# Patient Record
Sex: MALE | Race: WHITE | Employment: OTHER | URBAN - METROPOLITAN AREA
[De-identification: names, ages, dates, MRNs, and addresses within clinical notes are randomized per-mention and may not be internally consistent; named-entity substitution may affect disease eponyms.]

---

## 2017-03-27 ENCOUNTER — LAB ENCOUNTER (OUTPATIENT)
Dept: LAB | Age: 77
End: 2017-03-27
Attending: NURSE PRACTITIONER
Payer: MEDICARE

## 2017-03-27 ENCOUNTER — APPOINTMENT (OUTPATIENT)
Dept: LAB | Age: 77
End: 2017-03-27
Attending: FAMILY MEDICINE
Payer: MEDICARE

## 2017-03-27 DIAGNOSIS — Z12.5 SCREENING FOR PROSTATE CANCER: ICD-10-CM

## 2017-03-27 DIAGNOSIS — R63.4 WEIGHT LOSS: ICD-10-CM

## 2017-03-27 DIAGNOSIS — E03.9 HYPOTHYROIDISM, UNSPECIFIED TYPE: Chronic | ICD-10-CM

## 2017-03-27 DIAGNOSIS — E78.00 PURE HYPERCHOLESTEROLEMIA: ICD-10-CM

## 2017-03-27 LAB
ALBUMIN SERPL-MCNC: 3.8 G/DL (ref 3.5–4.8)
ALP LIVER SERPL-CCNC: 70 U/L (ref 45–117)
ALT SERPL-CCNC: 15 U/L (ref 17–63)
AST SERPL-CCNC: 15 U/L (ref 15–41)
BASOPHILS # BLD AUTO: 0.03 X10(3) UL (ref 0–0.1)
BASOPHILS NFR BLD AUTO: 0.6 %
BILIRUB SERPL-MCNC: 1.9 MG/DL (ref 0.1–2)
BUN BLD-MCNC: 18 MG/DL (ref 8–20)
CALCIUM BLD-MCNC: 9.1 MG/DL (ref 8.3–10.3)
CHLORIDE: 97 MMOL/L (ref 101–111)
CHOLEST SMN-MCNC: 148 MG/DL (ref ?–200)
CO2: 27 MMOL/L (ref 22–32)
COMPLEXED PSA SERPL-MCNC: 0.36 NG/ML (ref 0.01–4)
CREAT BLD-MCNC: 1.17 MG/DL (ref 0.7–1.3)
EOSINOPHIL # BLD AUTO: 0.21 X10(3) UL (ref 0–0.3)
EOSINOPHIL NFR BLD AUTO: 4.2 %
ERYTHROCYTE [DISTWIDTH] IN BLOOD BY AUTOMATED COUNT: 13.1 % (ref 11.5–16)
FREE T4: 1.6 NG/DL (ref 0.9–1.8)
GLUCOSE BLD-MCNC: 107 MG/DL (ref 70–99)
HCT VFR BLD AUTO: 40.7 % (ref 37–53)
HDLC SERPL-MCNC: 70 MG/DL (ref 45–?)
HDLC SERPL: 2.11 {RATIO} (ref ?–4.97)
HGB BLD-MCNC: 14 G/DL (ref 13–17)
IMMATURE GRANULOCYTE COUNT: 0.01 X10(3) UL (ref 0–1)
IMMATURE GRANULOCYTE RATIO %: 0.2 %
LDLC SERPL CALC-MCNC: 59 MG/DL (ref ?–130)
LYMPHOCYTES # BLD AUTO: 0.99 X10(3) UL (ref 0.9–4)
LYMPHOCYTES NFR BLD AUTO: 19.8 %
M PROTEIN MFR SERPL ELPH: 6.8 G/DL (ref 6.1–8.3)
MCH RBC QN AUTO: 32 PG (ref 27–33.2)
MCHC RBC AUTO-ENTMCNC: 34.4 G/DL (ref 31–37)
MCV RBC AUTO: 92.9 FL (ref 80–99)
MONOCYTES # BLD AUTO: 0.34 X10(3) UL (ref 0.1–0.6)
MONOCYTES NFR BLD AUTO: 6.8 %
NEUTROPHIL ABS PRELIM: 3.42 X10 (3) UL (ref 1.3–6.7)
NEUTROPHILS # BLD AUTO: 3.42 X10(3) UL (ref 1.3–6.7)
NEUTROPHILS NFR BLD AUTO: 68.4 %
NONHDLC SERPL-MCNC: 78 MG/DL (ref ?–130)
PLATELET # BLD AUTO: 173 10(3)UL (ref 150–450)
POTASSIUM SERPL-SCNC: 3.9 MMOL/L (ref 3.6–5.1)
RBC # BLD AUTO: 4.38 X10(6)UL (ref 3.8–5.8)
RED CELL DISTRIBUTION WIDTH-SD: 44.8 FL (ref 35.1–46.3)
SODIUM SERPL-SCNC: 135 MMOL/L (ref 136–144)
TRIGLYCERIDES: 94 MG/DL (ref ?–150)
TSI SER-ACNC: 1.68 MIU/ML (ref 0.35–5.5)
VLDL: 19 MG/DL (ref 5–40)
WBC # BLD AUTO: 5 X10(3) UL (ref 4–13)

## 2017-03-27 PROCEDURE — 84443 ASSAY THYROID STIM HORMONE: CPT

## 2017-03-27 PROCEDURE — 84439 ASSAY OF FREE THYROXINE: CPT

## 2017-03-27 PROCEDURE — 80061 LIPID PANEL: CPT

## 2017-03-27 PROCEDURE — 36415 COLL VENOUS BLD VENIPUNCTURE: CPT

## 2017-03-27 PROCEDURE — 85025 COMPLETE CBC W/AUTO DIFF WBC: CPT

## 2017-03-27 PROCEDURE — 80053 COMPREHEN METABOLIC PANEL: CPT

## 2017-03-27 NOTE — PROGRESS NOTES
Was seen last week in the GI office for constipation. He had been taking his MiraLAX very intermittently. The nurse practitioner there has been doing it twice a day. Bowels are moving very regular at this point. He does take fentanyl patch 25+12 µg.   I Procedure: TRANSFORAMINAL EPIDURAL - LUMBAR;  Surgeon: Olivia South MD;  Location: [facility] FOR PAIN MANAGEMENT    INJECTION, W/WO CONTRAST, DX/THERAPEUTIC SUBSTANCE, EPIDURAL/SUBARACHNOID; LUMBAR/SACRAL  9/28/2012    Comment Procedure: TRANSFORAMINAL PAIN MANAGEMENT    PATIENT DOCUMENTED NOT TO HAVE EXPERIENCED ANY OF THE FOLLOWING EVENTS  12/18/2013    Comment Procedure: STIMULATOR TRIAL EPIDURAL LEAD;  Surgeon: Carolina Weir MD;  Location: [address] [place] [address] MANAGEMENT    PERCUT IMPLNT [address] Procedure: INTRATHECAL PUMP TRIAL;  Surgeon: Yanet Paris MD;  Location: [address] [place] [address]    PATIENT [address] [place] [address] FOR IV ANTIBIOTIC SURGICAL SITE INFECTION PROPHYLAXIS.  4/28/2014    Comment Procedure: INTRATHECAL PUMP TRIAL [facility] ENDOSCOPY     MEDICATIONS:    Current Outpatient Prescriptions:  Levothyroxine Sodium 88 MCG Oral Tab TAKE 1 TABLET (88 MCG TOTAL) BY MOUTH DAILY. Disp: 90 tablet Rfl: 3   ClonazePAM 2 MG Oral Tab Take 1 tablet (2 mg total) by mouth nightly.  Disp: 30 ta Father    • Heart Disorder Father    • Hypertension Mother    • Heart Disorder Mother        PHYSICAL EXAM:  /82 mmHg  Pulse 106  Temp(Src) 97.8 °F (36.6 °C) (Oral)  Resp 18  Ht 70\"  Wt 129 lb  BMI 18.51 kg/m2  SpO2 97%    Alert thin male no acute d

## 2017-03-29 ENCOUNTER — PATIENT OUTREACH (OUTPATIENT)
Dept: CASE MANAGEMENT | Age: 77
End: 2017-03-29

## 2017-03-29 ENCOUNTER — TELEPHONE (OUTPATIENT)
Dept: CASE MANAGEMENT | Age: 77
End: 2017-03-29

## 2017-03-29 NOTE — TELEPHONE ENCOUNTER
CBC and CMP was done in epic Please review for patient. Also is it okay for patient to start a probiotic? Please send back to me so i can contact Patient back.      Lab Results  Component Value Date   WBC 5.0 03/27/2017   HGB 14.0 03/27/2017   HCT 40.7 03/2

## 2017-03-30 ENCOUNTER — PATIENT OUTREACH (OUTPATIENT)
Dept: CASE MANAGEMENT | Age: 77
End: 2017-03-30

## 2017-03-30 NOTE — PROGRESS NOTES
Called pt to do an assessment and Pt declined program.     Patient identified with a potential need for Chronic Care Management services. Called patient to introduce self and availability of Chronic Care Management services.   Patient informed about the fo

## 2017-03-31 NOTE — TELEPHONE ENCOUNTER
Message:    Giorgi Chen is referring to 3-27-17 labs ordered per Jarad Turner. Approval for probiotic?

## 2017-06-16 NOTE — TELEPHONE ENCOUNTER
From: Pranav Scott  To: Maribell Freire MD  Sent: 6/16/2017 3:34 PM CDT  Subject: Prescription Question    I would like to request a refill on Clonazepam (2MG) with 3 refills.   Thank You,  Daniel Calle

## 2017-06-17 NOTE — TELEPHONE ENCOUNTER
----- Message from [address] [place] [address] [address] Generic sent at 6/16/2017  3:34 PM CDT -----  Regarding: Prescription Question  Contact: [phone]  I would like to request a refill on Clonazepam (2MG) with 3 refills.   Thank You,  Ivon Ritchie

## 2017-06-23 ENCOUNTER — APPOINTMENT (OUTPATIENT)
Dept: LAB | Age: 77
End: 2017-06-23
Attending: FAMILY MEDICINE
Payer: MEDICARE

## 2017-06-23 ENCOUNTER — OFFICE VISIT (OUTPATIENT)
Dept: FAMILY MEDICINE CLINIC | Facility: CLINIC | Age: 77
End: 2017-06-23

## 2017-06-23 ENCOUNTER — HOSPITAL ENCOUNTER (OUTPATIENT)
Dept: GENERAL RADIOLOGY | Age: 77
Discharge: HOME | End: 2017-06-23
Attending: FAMILY MEDICINE
Payer: MEDICARE

## 2017-06-23 VITALS
WEIGHT: 126 LBS | RESPIRATION RATE: 20 BRPM | OXYGEN SATURATION: 96 % | TEMPERATURE: 98 F | SYSTOLIC BLOOD PRESSURE: 124 MMHG | BODY MASS INDEX: 18 KG/M2 | HEART RATE: 78 BPM | DIASTOLIC BLOOD PRESSURE: 80 MMHG

## 2017-06-23 DIAGNOSIS — R13.14 PHARYNGOESOPHAGEAL DYSPHAGIA: ICD-10-CM

## 2017-06-23 DIAGNOSIS — R20.2 PARESTHESIA AND PAIN OF BOTH UPPER EXTREMITIES: ICD-10-CM

## 2017-06-23 DIAGNOSIS — E53.8 VITAMIN B12 DEFICIENCY: ICD-10-CM

## 2017-06-23 DIAGNOSIS — M79.602 PARESTHESIA AND PAIN OF BOTH UPPER EXTREMITIES: ICD-10-CM

## 2017-06-23 DIAGNOSIS — R73.9 HYPERGLYCEMIA: ICD-10-CM

## 2017-06-23 DIAGNOSIS — R13.14 PHARYNGOESOPHAGEAL DYSPHAGIA: Primary | ICD-10-CM

## 2017-06-23 DIAGNOSIS — M79.601 PARESTHESIA AND PAIN OF BOTH UPPER EXTREMITIES: ICD-10-CM

## 2017-06-23 DIAGNOSIS — H61.23 BILATERAL IMPACTED CERUMEN: ICD-10-CM

## 2017-06-23 PROCEDURE — 72052 X-RAY EXAM NECK SPINE 6/>VWS: CPT | Performed by: FAMILY MEDICINE

## 2017-06-23 PROCEDURE — 99213 OFFICE O/P EST LOW 20 MIN: CPT | Performed by: FAMILY MEDICINE

## 2017-06-23 PROCEDURE — 83036 HEMOGLOBIN GLYCOSYLATED A1C: CPT

## 2017-06-23 PROCEDURE — 69210 REMOVE IMPACTED EAR WAX UNI: CPT | Performed by: FAMILY MEDICINE

## 2017-06-23 PROCEDURE — 36415 COLL VENOUS BLD VENIPUNCTURE: CPT

## 2017-06-23 RX ORDER — BLOOD SUGAR DIAGNOSTIC
STRIP MISCELLANEOUS
Qty: 10 EACH | Refills: 2 | Status: SHIPPED | OUTPATIENT
Start: 2017-06-23 | End: 2021-01-07

## 2017-06-23 RX ORDER — CYANOCOBALAMIN 1000 UG/ML
INJECTION INTRAMUSCULAR; SUBCUTANEOUS
Qty: 30 ML | Refills: 0 | Status: SHIPPED | OUTPATIENT
Start: 2017-06-23 | End: 2018-08-13

## 2017-06-23 NOTE — PROGRESS NOTES
Here for a myriad of symptoms as typical for Scott Smiling. He has had 3 disks replaced in the cervical spine. He had a broken jaw approximately 18 months ago. He is having paresthesias in both arms. He states that he can feel a pinching sensation.   [facility]    INJECTION, ANESTHETIC/STEROID, TRANSFORAMINAL EPIDURAL; LUMBAR/SACRAL, SINGLE LEVEL  4/2/2013    Comment Procedure: TRANSFORAMINAL EPIDURAL - LUMBAR;  Surgeon: Lucien Jacinto MD;  Location: [facility] Yen Carey MD;  Location: [place] [address] IMPL[address] [facility]  2/3/2014    Comment Procedure: STIMULATOR TRIAL PERIPHERAL;  Surgeon: Barrett Silva MD;  Location: [place] [address] [address] [facility]  2/ PAIN MANAGEMENT    CATARACT      ORTHOPEDIC SURG (PBP)  5/2013 8/2013    Comment Lumbar 4 - 5     HERNIA SURGERY  2014    BACK SURGERY  5/22/14    Comment C4-C7 ACDF     INJECTION, W/WO CONTRAST, DX/THERAPEUTIC SUBSTANCE, EPIDURAL/SUBARACHNOID; LUMBAR/SACR HYDROcodone-acetaminophen (NORCO)  MG Oral Tab 1 PO q 6 hrs PRN (4/day max) Disp: 120 tablet Rfl: 0   pregabalin (LYRICA) 50 MG Oral Cap Take 1 capsule (50 mg total) by mouth 2 (two) times daily.  Disp: 180 capsule Rfl: 0   valsartan (DIOVAN) 80 MG getting more active getting out of the house in order to try to reduce his pain sensations. His wife states that besides coming to the doctor's office in the physical therapy he does not leave the house. Hearing should improve in the next 24 hours.   Justin

## 2017-09-18 ENCOUNTER — OFFICE VISIT (OUTPATIENT)
Dept: FAMILY MEDICINE CLINIC | Facility: CLINIC | Age: 77
End: 2017-09-18

## 2017-09-18 ENCOUNTER — APPOINTMENT (OUTPATIENT)
Dept: LAB | Age: 77
End: 2017-09-18
Attending: FAMILY MEDICINE
Payer: MEDICARE

## 2017-09-18 VITALS
RESPIRATION RATE: 16 BRPM | WEIGHT: 130 LBS | DIASTOLIC BLOOD PRESSURE: 82 MMHG | SYSTOLIC BLOOD PRESSURE: 134 MMHG | OXYGEN SATURATION: 96 % | HEART RATE: 77 BPM | TEMPERATURE: 98 F | BODY MASS INDEX: 19 KG/M2

## 2017-09-18 DIAGNOSIS — F32.9 REACTIVE DEPRESSION: ICD-10-CM

## 2017-09-18 DIAGNOSIS — R39.14 BENIGN PROSTATIC HYPERPLASIA WITH INCOMPLETE BLADDER EMPTYING: Primary | ICD-10-CM

## 2017-09-18 DIAGNOSIS — F06.8 ANXIETY DISORDER DUE TO MULTIPLE MEDICAL PROBLEMS: ICD-10-CM

## 2017-09-18 DIAGNOSIS — N40.1 BENIGN PROSTATIC HYPERPLASIA WITH INCOMPLETE BLADDER EMPTYING: Primary | ICD-10-CM

## 2017-09-18 DIAGNOSIS — R10.9 ABDOMINAL PAIN, UNSPECIFIED ABDOMINAL LOCATION: ICD-10-CM

## 2017-09-18 DIAGNOSIS — N40.1 BENIGN PROSTATIC HYPERPLASIA WITH INCOMPLETE BLADDER EMPTYING: ICD-10-CM

## 2017-09-18 DIAGNOSIS — F51.01 PRIMARY INSOMNIA: ICD-10-CM

## 2017-09-18 DIAGNOSIS — R39.14 BENIGN PROSTATIC HYPERPLASIA WITH INCOMPLETE BLADDER EMPTYING: ICD-10-CM

## 2017-09-18 DIAGNOSIS — E78.00 PURE HYPERCHOLESTEROLEMIA: ICD-10-CM

## 2017-09-18 LAB
MULTISTIX LOT#: NORMAL NUMERIC
PH, URINE: 5.5 (ref 4.5–8)
PSA SERPL-MCNC: 0.51 NG/ML (ref 0.01–4)
SPECIFIC GRAVITY: 1 (ref 1–1.03)
URINE-COLOR: YELLOW
UROBILINOGEN,SEMI-QN: 0.2 MG/DL (ref 0–1.9)

## 2017-09-18 PROCEDURE — 36415 COLL VENOUS BLD VENIPUNCTURE: CPT

## 2017-09-18 PROCEDURE — 81003 URINALYSIS AUTO W/O SCOPE: CPT | Performed by: FAMILY MEDICINE

## 2017-09-18 PROCEDURE — 99214 OFFICE O/P EST MOD 30 MIN: CPT | Performed by: FAMILY MEDICINE

## 2017-09-18 PROCEDURE — 84153 ASSAY OF PSA TOTAL: CPT

## 2017-09-18 RX ORDER — TAMSULOSIN HYDROCHLORIDE 0.4 MG/1
0.4 CAPSULE ORAL DAILY
Qty: 60 CAPSULE | Refills: 5 | Status: SHIPPED | OUTPATIENT
Start: 2017-09-18 | End: 2017-11-10 | Stop reason: SINTOL

## 2017-09-18 RX ORDER — CLONAZEPAM 2 MG/1
2 TABLET ORAL NIGHTLY
Qty: 30 TABLET | Refills: 2 | Status: SHIPPED | OUTPATIENT
Start: 2017-09-18 | End: 2017-12-13

## 2017-09-18 RX ORDER — ATORVASTATIN CALCIUM 10 MG/1
TABLET, FILM COATED ORAL
Qty: 90 TABLET | Refills: 0 | Status: SHIPPED | OUTPATIENT
Start: 2017-09-18 | End: 2018-01-04

## 2017-09-18 RX ORDER — PAROXETINE 10 MG/1
10 TABLET, FILM COATED ORAL EVERY MORNING
Qty: 30 TABLET | Refills: 2 | Status: SHIPPED | OUTPATIENT
Start: 2017-09-18 | End: 2017-11-10

## 2017-09-18 NOTE — PROGRESS NOTES
Here with incomplete bladder emptying. He is having some pain in the left lower quadrant. He was seen by surgery. Surgery mention that benign prostatic hypertrophy seen on his CT scan abdomen and pelvis from last spring 2016.   Reviewing the chart there MD;  Location:  [address] MANAGEMENT  2014: HERNIA SURGERY  9/7/2012: INJECTION, ANESTHETIC/STEROID, TRANSFORAMINAL *      Comment: Procedure: TRANSFORAMINAL EPIDURAL - LUMBAR;                 Surgeon: John Oconnor MD;  Location: INTRATHECAL PUMP TRIAL;  Surgeon:                Jaspal Medeiros MD;  Location:  [address] MANAGEMENT  5/2013 8/2013: ORTHOPEDIC SURG (PBP)      Comment: Lumbar 4 - 5   08:  OTHER      Comment: esophagogastroduodenoscopy  No date: OT Surgeon: Garry Felix MD;  Location: [address] [place] [address] MANAGEMENT  4/28/2014: [facility]IENT [facility]T*      Comment: Procedure: INTRATHECAL PUMP TRIAL;  Surgeon:                Garry Felix MD;  Jossue Sanchez Prescriptions:  ClonazePAM 2 MG Oral Tab Take 1 tablet (2 mg total) by mouth nightly. Disp: 30 tablet Rfl: 2   tamsulosin HCl 0.4 MG Oral Cap Take 1 capsule (0.4 mg total) by mouth daily.  Disp: 60 capsule Rfl: 5   PARoxetine HCl (PAXIL) 10 MG Oral Tab Take Wt 130 lb   SpO2 96%   BMI 18.65 kg/m²   Alert thin male no acute distress. Assessment BPH. Urinalysis negative. 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   Discussed it will take 6 or 8 weeks to become ef

## 2017-10-25 ENCOUNTER — TELEPHONE (OUTPATIENT)
Dept: FAMILY MEDICINE CLINIC | Facility: CLINIC | Age: 77
End: 2017-10-25

## 2017-11-10 ENCOUNTER — OFFICE VISIT (OUTPATIENT)
Dept: FAMILY MEDICINE CLINIC | Facility: CLINIC | Age: 77
End: 2017-11-10

## 2017-11-10 ENCOUNTER — APPOINTMENT (OUTPATIENT)
Dept: LAB | Age: 77
End: 2017-11-10
Attending: FAMILY MEDICINE
Payer: MEDICARE

## 2017-11-10 VITALS
DIASTOLIC BLOOD PRESSURE: 90 MMHG | WEIGHT: 129 LBS | HEIGHT: 69 IN | SYSTOLIC BLOOD PRESSURE: 120 MMHG | BODY MASS INDEX: 19.11 KG/M2 | HEART RATE: 70 BPM | TEMPERATURE: 98 F | RESPIRATION RATE: 18 BRPM | OXYGEN SATURATION: 98 %

## 2017-11-10 DIAGNOSIS — K83.8 COMMON BILE DUCT DILATION: Primary | ICD-10-CM

## 2017-11-10 DIAGNOSIS — G89.29 CHRONIC ABDOMINAL PAIN: ICD-10-CM

## 2017-11-10 DIAGNOSIS — R20.2 PARESTHESIA OF BOTH HANDS: ICD-10-CM

## 2017-11-10 DIAGNOSIS — N40.1 BENIGN PROSTATIC HYPERPLASIA WITH INCOMPLETE BLADDER EMPTYING: ICD-10-CM

## 2017-11-10 DIAGNOSIS — R10.9 CHRONIC ABDOMINAL PAIN: ICD-10-CM

## 2017-11-10 DIAGNOSIS — R20.2 PARESTHESIA OF BOTH FEET: ICD-10-CM

## 2017-11-10 DIAGNOSIS — N39.3 STRESS INCONTINENCE OF URINE: ICD-10-CM

## 2017-11-10 DIAGNOSIS — R39.14 BENIGN PROSTATIC HYPERPLASIA WITH INCOMPLETE BLADDER EMPTYING: ICD-10-CM

## 2017-11-10 DIAGNOSIS — K83.8 COMMON BILE DUCT DILATION: ICD-10-CM

## 2017-11-10 PROCEDURE — 84443 ASSAY THYROID STIM HORMONE: CPT

## 2017-11-10 PROCEDURE — 80048 BASIC METABOLIC PNL TOTAL CA: CPT

## 2017-11-10 PROCEDURE — 99214 OFFICE O/P EST MOD 30 MIN: CPT | Performed by: FAMILY MEDICINE

## 2017-11-10 PROCEDURE — 36415 COLL VENOUS BLD VENIPUNCTURE: CPT

## 2017-11-10 PROCEDURE — 82306 VITAMIN D 25 HYDROXY: CPT

## 2017-11-10 PROCEDURE — 82607 VITAMIN B-12: CPT

## 2017-11-10 RX ORDER — FINASTERIDE 5 MG/1
5 TABLET, FILM COATED ORAL DAILY
Qty: 30 TABLET | Refills: 5 | Status: SHIPPED | OUTPATIENT
Start: 2017-11-10 | End: 2018-08-13 | Stop reason: ALTCHOICE

## 2017-11-10 NOTE — PROGRESS NOTES
Could not tolerate fluoxetine for reactive depression. Discontinued this medicine. We had tried tamsulosin for BPH with urine incontinence. This gave him diarrhea and he discontinued this as well. Continues to have chronic abdominal pain.   Reviewing TRANSFORAMINAL *      Comment: Procedure: TRANSFORAMINAL EPIDURAL - LUMBAR;                 Surgeon: Herbert Gresham MD;  Location: [address] MANAGEMENT  4/2/2013: INJECTION, ANESTHETIC/STEROID, TRANSFORAMINAL *      Comment: [facility] 8/2013: ORTHOPEDIC SURG (PBP)      Comment: Lumbar 4 - 5   08:  OTHER      Comment: esophagogastroduodenoscopy  No date: OTHER      Comment: jaw fracture/repair, hardware remains  4/2/2013: PATIENT DOCUMENTED NOT TO HAVE EXPERIENCED ANY*      Comment: [facility] PREOPERATIVE ORDER FOR IV ANT*      Comment: Procedure: INTRATHECAL PUMP TRIAL;  Surgeon:                Jos Shah MD;  Location: [place] [address] MANAGEMENT  1/15/2015: PATIENT [address] FOR IV ANT* N/A      Comme TAKE 1 TABLET BY MOUTH IN THE MORNING Disp: 30 tablet Rfl: 6   fentaNYL 12 MCG/HR Transdermal Patch 72 Hr Place 1 patch onto the skin every third day.  Disp: 10 patch Rfl: 0   fentaNYL 25 MCG/HR Transdermal Patch 72 Hr Place 1 patch onto the skin every thir hands.  They are pink however. Capillary refill is normal.  Pulses are good. Decreased pinprick sensation on the plantar aspects of the feet.     Assessment #1 common bile duct dilation on CT scan in 2016 #2 chronic abdominal pain postprandial #3 stress i

## 2017-11-12 ENCOUNTER — HOSPITAL ENCOUNTER (OUTPATIENT)
Age: 77
Discharge: HOME | End: 2017-11-12
Payer: MEDICARE

## 2017-11-12 VITALS
HEART RATE: 86 BPM | RESPIRATION RATE: 18 BRPM | TEMPERATURE: 98 F | SYSTOLIC BLOOD PRESSURE: 144 MMHG | OXYGEN SATURATION: 98 % | DIASTOLIC BLOOD PRESSURE: 101 MMHG

## 2017-11-12 DIAGNOSIS — I10 ELEVATED BLOOD PRESSURE READING WITH DIAGNOSIS OF HYPERTENSION: ICD-10-CM

## 2017-11-12 DIAGNOSIS — H61.22 IMPACTED CERUMEN OF LEFT EAR: Primary | ICD-10-CM

## 2017-11-12 DIAGNOSIS — J01.90 ACUTE SINUSITIS, RECURRENCE NOT SPECIFIED, UNSPECIFIED LOCATION: ICD-10-CM

## 2017-11-12 DIAGNOSIS — T85.848A DENTAL IMPLANT PAIN, INITIAL ENCOUNTER: ICD-10-CM

## 2017-11-12 PROCEDURE — 99213 OFFICE O/P EST LOW 20 MIN: CPT

## 2017-11-12 PROCEDURE — 99204 OFFICE O/P NEW MOD 45 MIN: CPT

## 2017-11-12 PROCEDURE — 69209 REMOVE IMPACTED EAR WAX UNI: CPT

## 2017-11-12 RX ORDER — AMOXICILLIN AND CLAVULANATE POTASSIUM 875; 125 MG/1; MG/1
1 TABLET, FILM COATED ORAL 2 TIMES DAILY
Qty: 20 TABLET | Refills: 0 | Status: SHIPPED | OUTPATIENT
Start: 2017-11-12 | End: 2017-11-22

## 2017-11-12 RX ORDER — CIPROFLOXACIN AND DEXAMETHASONE 3; 1 MG/ML; MG/ML
2 SUSPENSION/ DROPS AURICULAR (OTIC) 2 TIMES DAILY
Qty: 1 BOTTLE | Refills: 0 | Status: SHIPPED | OUTPATIENT
Start: 2017-11-12 | End: 2018-04-04

## 2017-11-12 NOTE — ED INITIAL ASSESSMENT (HPI)
Pt presents to the immediate care due to left ear pain and bump over left upper gum. Pt states he noted the symptoms yesterday. Denies fever. States the bump has decreased in size since yesterday.  Pt states \"I don't know if it is an abscessed tooth or iris

## 2017-11-12 NOTE — ED PROVIDER NOTES
Patient Seen in: [facility]    History   Patient presents with:  Ear Problem Pain (neurosensory)    Stated Complaint: LT EAR PAIN X 2 DAYS    HPI    3year-old male here with a complaint of approximately left-sided ear pain in t ANESTHETIC/STEROID, TRANSFORAMINAL *      Comment: Procedure: TRANSFORAMINAL EPIDURAL - LUMBAR;                 Surgeon: Yvette Cartagena MD;  Location: [address] [place] [address] MANAGEMENT  4/2/2013: INJECTION, ANESTHETIC/STEROID, TRANSFORAMINAL * PAIN MANAGEMENT  5/2013 8/2013: ORTHOPEDIC SURG (PBP)      Comment: Lumbar 4 - 5   08:  OTHER      Comment: esophagogastroduodenoscopy  No date: OTHER      Comment: jaw fracture/repair, hardware remains  4/2/2013: PATIENT DOCUMENTED NOT TO HAVE EXPERIENCED PATIENT [place] PREOPERATIVE ORDER FOR IV ANT*      Comment: Procedure: INTRATHECAL PUMP TRIAL;  Surgeon:                Garry Felix MD;  Location: [place] [address] MANAGEMENT  1/15/2015: PATIENT [place] PREOPERATIVE ORDER FOR IV AN Never Used                      Alcohol use: No                Review of Systems    Positive for stated complaint: LT EAR PAIN X 2 DAYS  Other systems are as noted in HPI. Constitutional and vital signs reviewed.       All other systems reviewed and negati drops as prescribed, blood pressures to take to your primary care doctor. Make follow-up appointment with dental.      The patient is in good condition thru out treatment today and remains so upon discharge.   I discussed the plan of care with the patient,

## 2017-11-17 ENCOUNTER — HOSPITAL ENCOUNTER (OUTPATIENT)
Dept: MRI IMAGING | Facility: HOSPITAL | Age: 77
Discharge: HOME | End: 2017-11-17
Attending: FAMILY MEDICINE
Payer: MEDICARE

## 2017-11-17 DIAGNOSIS — R10.9 CHRONIC ABDOMINAL PAIN: ICD-10-CM

## 2017-11-17 DIAGNOSIS — K83.8 COMMON BILE DUCT DILATION: ICD-10-CM

## 2017-11-17 DIAGNOSIS — G89.29 CHRONIC ABDOMINAL PAIN: ICD-10-CM

## 2017-11-17 PROCEDURE — 76376 3D RENDER W/INTRP POSTPROCES: CPT | Performed by: FAMILY MEDICINE

## 2017-11-17 PROCEDURE — A9581 GADOXETATE DISODIUM INJ: HCPCS | Performed by: FAMILY MEDICINE

## 2017-11-17 PROCEDURE — 74183 MRI ABD W/O CNTR FLWD CNTR: CPT | Performed by: FAMILY MEDICINE

## 2017-12-13 DIAGNOSIS — F06.8 ANXIETY DISORDER DUE TO MULTIPLE MEDICAL PROBLEMS: ICD-10-CM

## 2017-12-13 DIAGNOSIS — F51.01 PRIMARY INSOMNIA: ICD-10-CM

## 2017-12-14 RX ORDER — CLONAZEPAM 2 MG/1
2 TABLET ORAL NIGHTLY
Qty: 30 TABLET | Refills: 2 | Status: SHIPPED | OUTPATIENT
Start: 2017-12-14 | End: 2018-03-15

## 2017-12-14 NOTE — TELEPHONE ENCOUNTER
From: Ga Kiser  Sent: 12/13/2017 12:19 PM CST  Subject: Medication Renewal Request    Philippe Zuleta would like a refill of the following medications:     ClonazePAM 2 MG Oral Tab [Ozize Alfaro MD]   Patient Comment: 30 tablets per Rx wit

## 2018-01-04 DIAGNOSIS — E78.00 PURE HYPERCHOLESTEROLEMIA: ICD-10-CM

## 2018-01-04 RX ORDER — ATORVASTATIN CALCIUM 10 MG/1
TABLET, FILM COATED ORAL
Qty: 90 TABLET | Refills: 0 | Status: SHIPPED | OUTPATIENT
Start: 2018-01-04 | End: 2018-05-05

## 2018-02-22 DIAGNOSIS — E03.9 HYPOTHYROIDISM, UNSPECIFIED TYPE: Chronic | ICD-10-CM

## 2018-02-22 RX ORDER — LEVOTHYROXINE SODIUM 88 UG/1
TABLET ORAL
Qty: 90 TABLET | Refills: 3 | Status: SHIPPED | OUTPATIENT
Start: 2018-02-22 | End: 2018-11-06

## 2018-03-15 ENCOUNTER — PATIENT MESSAGE (OUTPATIENT)
Dept: FAMILY MEDICINE CLINIC | Facility: CLINIC | Age: 78
End: 2018-03-15

## 2018-03-15 DIAGNOSIS — F06.8 ANXIETY DISORDER DUE TO MULTIPLE MEDICAL PROBLEMS: ICD-10-CM

## 2018-03-15 DIAGNOSIS — F51.01 PRIMARY INSOMNIA: ICD-10-CM

## 2018-03-15 RX ORDER — CLONAZEPAM 2 MG/1
2 TABLET ORAL NIGHTLY
Qty: 30 TABLET | Refills: 2 | Status: SHIPPED
Start: 2018-03-15 | End: 2018-06-11

## 2018-03-15 NOTE — TELEPHONE ENCOUNTER
From: Rody Ledesma  To: Scot Jackson MD  Sent: 3/15/2018 11:20 AM CDT  Subject: Prescription Question    I would like to request a refill on Clonazepam (30 tablets) 2 MG with refills.   Thank You,  Mely Tuttle

## 2018-03-16 ENCOUNTER — PATIENT OUTREACH (OUTPATIENT)
Dept: FAMILY MEDICINE CLINIC | Facility: CLINIC | Age: 78
End: 2018-03-16

## 2018-04-04 ENCOUNTER — APPOINTMENT (OUTPATIENT)
Dept: CT IMAGING | Facility: HOSPITAL | Age: 78
End: 2018-04-04
Attending: EMERGENCY MEDICINE
Payer: MEDICARE

## 2018-04-04 ENCOUNTER — HOSPITAL ENCOUNTER (EMERGENCY)
Facility: HOSPITAL | Age: 78
Discharge: HOME | End: 2018-04-04
Attending: EMERGENCY MEDICINE
Payer: MEDICARE

## 2018-04-04 VITALS
HEIGHT: 70 IN | BODY MASS INDEX: 17.04 KG/M2 | OXYGEN SATURATION: 100 % | WEIGHT: 119 LBS | SYSTOLIC BLOOD PRESSURE: 120 MMHG | HEART RATE: 76 BPM | DIASTOLIC BLOOD PRESSURE: 71 MMHG | RESPIRATION RATE: 18 BRPM | TEMPERATURE: 98 F

## 2018-04-04 DIAGNOSIS — R10.9 CHRONIC ABDOMINAL PAIN: Primary | ICD-10-CM

## 2018-04-04 DIAGNOSIS — G89.29 CHRONIC ABDOMINAL PAIN: Primary | ICD-10-CM

## 2018-04-04 PROCEDURE — 99284 EMERGENCY DEPT VISIT MOD MDM: CPT

## 2018-04-04 PROCEDURE — 85025 COMPLETE CBC W/AUTO DIFF WBC: CPT

## 2018-04-04 PROCEDURE — 96374 THER/PROPH/DIAG INJ IV PUSH: CPT

## 2018-04-04 PROCEDURE — 80053 COMPREHEN METABOLIC PANEL: CPT

## 2018-04-04 PROCEDURE — 80053 COMPREHEN METABOLIC PANEL: CPT | Performed by: EMERGENCY MEDICINE

## 2018-04-04 PROCEDURE — 81003 URINALYSIS AUTO W/O SCOPE: CPT | Performed by: EMERGENCY MEDICINE

## 2018-04-04 PROCEDURE — 96361 HYDRATE IV INFUSION ADD-ON: CPT

## 2018-04-04 PROCEDURE — 85025 COMPLETE CBC W/AUTO DIFF WBC: CPT | Performed by: EMERGENCY MEDICINE

## 2018-04-04 PROCEDURE — 74177 CT ABD & PELVIS W/CONTRAST: CPT | Performed by: EMERGENCY MEDICINE

## 2018-04-04 PROCEDURE — 99285 EMERGENCY DEPT VISIT HI MDM: CPT

## 2018-04-04 RX ORDER — MORPHINE SULFATE 4 MG/ML
2 INJECTION, SOLUTION INTRAMUSCULAR; INTRAVENOUS ONCE
Status: COMPLETED | OUTPATIENT
Start: 2018-04-04 | End: 2018-04-04

## 2018-04-04 RX ORDER — SODIUM CHLORIDE 9 MG/ML
INJECTION, SOLUTION INTRAVENOUS CONTINUOUS
Status: DISCONTINUED | OUTPATIENT
Start: 2018-04-04 | End: 2018-04-04

## 2018-04-04 RX ORDER — MORPHINE SULFATE 4 MG/ML
2 INJECTION, SOLUTION INTRAMUSCULAR; INTRAVENOUS ONCE
Status: DISCONTINUED | OUTPATIENT
Start: 2018-04-04 | End: 2018-04-04

## 2018-04-04 RX ORDER — OFLOXACIN 3 MG/ML
1 SOLUTION/ DROPS OPHTHALMIC AS NEEDED
COMMUNITY
End: 2018-08-02

## 2018-04-04 RX ORDER — ONDANSETRON 2 MG/ML
4 INJECTION INTRAMUSCULAR; INTRAVENOUS ONCE
Status: DISCONTINUED | OUTPATIENT
Start: 2018-04-04 | End: 2018-04-04

## 2018-04-04 RX ORDER — PREGABALIN 50 MG/1
50 CAPSULE ORAL DAILY
COMMUNITY
End: 2018-08-02

## 2018-04-04 RX ORDER — POLYETHYLENE GLYCOL 3350 17 G/17G
17 POWDER, FOR SOLUTION ORAL 2 TIMES DAILY
COMMUNITY
End: 2018-08-13 | Stop reason: SINTOL

## 2018-04-04 NOTE — ED NOTES
Pt up to bathroom at this time to attempt urine specimen. Pt awake and alert, appears comfortable. Wife at bedside.

## 2018-04-04 NOTE — ED INITIAL ASSESSMENT (HPI)
Patient arrives with c/o lower abdominal pain that began this morning. Patient states he also is having urinary retention and unable to have a bowel movement.

## 2018-04-05 NOTE — ED NOTES
Attempt made x 2 to obtain blue top tube, but with both IV blood draws, tube was short. To d/c that order.

## 2018-04-05 NOTE — ED NOTES
Pt appears comfortable, still c/o pain. Report to Samantha Perkins [facility]. Pt and wife aware awaiting CT.

## 2018-04-05 NOTE — ED PROVIDER NOTES
Patient Seen in: [facility] Emergency Department    History   Patient presents with:  Abdomen/Flank Pain (GI/)    Stated Complaint: abdominal pain    HPI    This is a very friendly 53-year-old male here with his wife concerned about lower abdomina TRANSFORAMINAL EPIDURAL - LUMBAR;                 Surgeon: Brett Ruiz MD;  Location: [address] [place] [address] MANAGEMENT  4/2/2013: INJECTION, ANESTHETIC/STEROID, TRANSFORAMINAL *      Comment: Procedure: TRANSFORAMINAL EPIDURAL - LUMBAR; Comment: Lumbar 4 - 5   08:  OTHER      Comment: esophagogastroduodenoscopy  No date: OTHER      Comment: jaw fracture/repair, hardware remains  4/2/2013: PATIENT DOCUMENTED NOT TO HAVE EXPERIENCED ANY*      Comment: Procedure: TRANSFORAMINAL EPIDURAL - LUM Comment: Procedure: INTRATHECAL PUMP TRIAL;  Surgeon:                Jos Shah MD;  Location: [address] [place] [address] Drive MANAGEMENT  1/15/2015: PATIENT [place] PREOPERATIVE ORDER FOR IV ANT* N/A      Comment: Procedure: INTRATHECAL PUMP TRIA complaint: abdominal pain  Other systems are as noted in HPI. Constitutional and vital signs reviewed. All other systems reviewed and negative except as noted above.     Physical Exam   ED Triage Vitals [04/04/18 1732]  BP: 143/89  Pulse: 102  Resp: 1 Final result                 Please view results for these tests on the individual orders.    RAINBOW DRAW LAVENDER   RAINBOW DRAW LIGHT GREEN   RAINBOW DRAW GOLD   RADHA W/ DIFFERENTIAL       ED Course as of Apr 05 0105  ----------------------------

## 2018-04-13 ENCOUNTER — PATIENT OUTREACH (OUTPATIENT)
Dept: FAMILY MEDICINE CLINIC | Facility: CLINIC | Age: 78
End: 2018-04-13

## 2018-04-13 NOTE — PROGRESS NOTES
SPOKE TO PT REGARDING HIS AWV APPT, PT STATED HE IS NOT SURE IF HE WANTS TO DO IT, HE WILL CB IF HE DECIDES TO DO IT.

## 2018-04-25 PROBLEM — R63.0 LOSS OF APPETITE: Status: ACTIVE | Noted: 2018-04-25

## 2018-04-25 PROBLEM — R10.32 LEFT LOWER QUADRANT PAIN: Status: ACTIVE | Noted: 2018-04-25

## 2018-04-25 PROBLEM — R10.31 RIGHT LOWER QUADRANT ABDOMINAL PAIN: Status: ACTIVE | Noted: 2018-04-25

## 2018-05-05 DIAGNOSIS — E78.00 PURE HYPERCHOLESTEROLEMIA: ICD-10-CM

## 2018-05-07 RX ORDER — ATORVASTATIN CALCIUM 10 MG/1
TABLET, FILM COATED ORAL
Qty: 90 TABLET | Refills: 0 | Status: SHIPPED | OUTPATIENT
Start: 2018-05-07 | End: 2018-08-04

## 2018-05-15 ENCOUNTER — PATIENT MESSAGE (OUTPATIENT)
Dept: FAMILY MEDICINE CLINIC | Facility: CLINIC | Age: 78
End: 2018-05-15

## 2018-05-15 RX ORDER — OFLOXACIN 3 MG/ML
1 SOLUTION/ DROPS OPHTHALMIC 4 TIMES DAILY
Qty: 5 ML | Refills: 0 | Status: SHIPPED | OUTPATIENT
Start: 2018-05-15 | End: 2018-08-13 | Stop reason: ALTCHOICE

## 2018-05-15 NOTE — TELEPHONE ENCOUNTER
From: Gurmeet Zarco  To: Anita Tuttle MD  Sent: 5/15/2018 1:39 PM CDT  Subject: Prescription Question    I have a blocked tear duct which is acting up.  Could I get a prescription for Ofloxacin, Ophthalmic Solution which I have taken previously as

## 2018-05-15 NOTE — TELEPHONE ENCOUNTER
Dr. Marisa Walton,  See below. Patient last seen 11/10/17. See medication in his list but last fill? appt with you or Opthomology?

## 2018-06-11 ENCOUNTER — PATIENT MESSAGE (OUTPATIENT)
Dept: FAMILY MEDICINE CLINIC | Facility: CLINIC | Age: 78
End: 2018-06-11

## 2018-06-11 DIAGNOSIS — F51.01 PRIMARY INSOMNIA: ICD-10-CM

## 2018-06-11 DIAGNOSIS — F06.8 ANXIETY DISORDER DUE TO MULTIPLE MEDICAL PROBLEMS: ICD-10-CM

## 2018-06-11 RX ORDER — CLONAZEPAM 2 MG/1
2 TABLET ORAL NIGHTLY
Qty: 30 TABLET | Refills: 2 | Status: SHIPPED
Start: 2018-06-11 | End: 2018-09-17

## 2018-06-11 NOTE — TELEPHONE ENCOUNTER
From: Pranav Scott  To: Maribell Freire MD  Sent: 6/11/2018 9:04 AM CDT  Subject: Prescription Question    Dr. Louis Cummings,  Can I get a prescription for Clonazepam (30 tablets, 2MG) with refills. I need the medicine to sleep at night.   Thank Tonya Graham

## 2018-07-18 PROBLEM — K83.8 COMMON BILE DUCT DILATION: Status: ACTIVE | Noted: 2018-07-18

## 2018-07-18 PROBLEM — R13.19 ESOPHAGEAL DYSPHAGIA: Status: ACTIVE | Noted: 2018-07-18

## 2018-07-31 ENCOUNTER — HOSPITAL ENCOUNTER (EMERGENCY)
Facility: HOSPITAL | Age: 78
Discharge: HOME | End: 2018-07-31
Attending: EMERGENCY MEDICINE
Payer: MEDICARE

## 2018-07-31 ENCOUNTER — APPOINTMENT (OUTPATIENT)
Dept: GENERAL RADIOLOGY | Facility: HOSPITAL | Age: 78
End: 2018-07-31
Attending: EMERGENCY MEDICINE
Payer: MEDICARE

## 2018-07-31 VITALS
HEIGHT: 70 IN | DIASTOLIC BLOOD PRESSURE: 84 MMHG | WEIGHT: 114 LBS | OXYGEN SATURATION: 100 % | HEART RATE: 73 BPM | BODY MASS INDEX: 16.32 KG/M2 | SYSTOLIC BLOOD PRESSURE: 140 MMHG | RESPIRATION RATE: 13 BRPM | TEMPERATURE: 98 F

## 2018-07-31 DIAGNOSIS — R19.7 DIARRHEA, UNSPECIFIED TYPE: Primary | ICD-10-CM

## 2018-07-31 LAB
ALBUMIN SERPL-MCNC: 3.3 G/DL (ref 3.5–4.8)
ALBUMIN/GLOB SERPL: 0.9 {RATIO} (ref 1–2)
ALP LIVER SERPL-CCNC: 131 U/L (ref 45–117)
ALT SERPL-CCNC: 22 U/L (ref 17–63)
ANION GAP SERPL CALC-SCNC: 6 MMOL/L (ref 0–18)
AST SERPL-CCNC: 26 U/L (ref 15–41)
BASOPHILS # BLD AUTO: 0.02 X10(3) UL (ref 0–0.1)
BASOPHILS NFR BLD AUTO: 0.2 %
BILIRUB SERPL-MCNC: 1.2 MG/DL (ref 0.1–2)
BILIRUB UR QL STRIP.AUTO: NEGATIVE
BUN BLD-MCNC: 17 MG/DL (ref 8–20)
BUN/CREAT SERPL: 14.4 (ref 10–20)
CALCIUM BLD-MCNC: 9.1 MG/DL (ref 8.3–10.3)
CHLORIDE SERPL-SCNC: 102 MMOL/L (ref 101–111)
CO2 SERPL-SCNC: 30 MMOL/L (ref 22–32)
CREAT BLD-MCNC: 1.18 MG/DL (ref 0.7–1.3)
EOSINOPHIL # BLD AUTO: 0.03 X10(3) UL (ref 0–0.3)
EOSINOPHIL NFR BLD AUTO: 0.4 %
ERYTHROCYTE [DISTWIDTH] IN BLOOD BY AUTOMATED COUNT: 13.6 % (ref 11.5–16)
GLOBULIN PLAS-MCNC: 3.6 G/DL (ref 2.5–3.7)
GLUCOSE BLD-MCNC: 102 MG/DL (ref 70–99)
GLUCOSE UR STRIP.AUTO-MCNC: NEGATIVE MG/DL
HCT VFR BLD AUTO: 40.1 % (ref 37–53)
HGB BLD-MCNC: 13.6 G/DL (ref 13–17)
HYALINE CASTS #/AREA URNS AUTO: PRESENT /LPF
IMMATURE GRANULOCYTE COUNT: 0.03 X10(3) UL (ref 0–1)
IMMATURE GRANULOCYTE RATIO %: 0.4 %
LEUKOCYTE ESTERASE UR QL STRIP.AUTO: NEGATIVE
LIPASE: 248 U/L (ref 73–393)
LYMPHOCYTES # BLD AUTO: 1.27 X10(3) UL (ref 0.9–4)
LYMPHOCYTES NFR BLD AUTO: 15.3 %
M PROTEIN MFR SERPL ELPH: 6.9 G/DL (ref 6.1–8.3)
MCH RBC QN AUTO: 32 PG (ref 27–33.2)
MCHC RBC AUTO-ENTMCNC: 33.9 G/DL (ref 31–37)
MCV RBC AUTO: 94.4 FL (ref 80–99)
MONOCYTES # BLD AUTO: 0.41 X10(3) UL (ref 0.1–1)
MONOCYTES NFR BLD AUTO: 5 %
NEUTROPHIL ABS PRELIM: 6.52 X10 (3) UL (ref 1.3–6.7)
NEUTROPHILS # BLD AUTO: 6.52 X10(3) UL (ref 1.3–6.7)
NEUTROPHILS NFR BLD AUTO: 78.7 %
NITRITE UR QL STRIP.AUTO: NEGATIVE
OSMOLALITY SERPL CALC.SUM OF ELEC: 288 MOSM/KG (ref 275–295)
PH UR STRIP.AUTO: 5 [PH] (ref 4.5–8)
PLATELET # BLD AUTO: 151 10(3)UL (ref 150–450)
POTASSIUM SERPL-SCNC: 4.1 MMOL/L (ref 3.6–5.1)
PROT UR STRIP.AUTO-MCNC: 30 MG/DL
RBC # BLD AUTO: 4.25 X10(6)UL (ref 3.8–5.8)
RBC #/AREA URNS AUTO: >10 /HPF
RBC UR QL AUTO: NEGATIVE
RED CELL DISTRIBUTION WIDTH-SD: 47 FL (ref 35.1–46.3)
SODIUM SERPL-SCNC: 138 MMOL/L (ref 136–144)
SP GR UR STRIP.AUTO: 1.02 (ref 1–1.03)
UROBILINOGEN UR STRIP.AUTO-MCNC: <2 MG/DL
WBC # BLD AUTO: 8.3 X10(3) UL (ref 4–13)

## 2018-07-31 PROCEDURE — 81001 URINALYSIS AUTO W/SCOPE: CPT | Performed by: EMERGENCY MEDICINE

## 2018-07-31 PROCEDURE — 87427 SHIGA-LIKE TOXIN AG IA: CPT | Performed by: EMERGENCY MEDICINE

## 2018-07-31 PROCEDURE — 87086 URINE CULTURE/COLONY COUNT: CPT | Performed by: EMERGENCY MEDICINE

## 2018-07-31 PROCEDURE — 96361 HYDRATE IV INFUSION ADD-ON: CPT

## 2018-07-31 PROCEDURE — 83690 ASSAY OF LIPASE: CPT | Performed by: EMERGENCY MEDICINE

## 2018-07-31 PROCEDURE — 82272 OCCULT BLD FECES 1-3 TESTS: CPT | Performed by: EMERGENCY MEDICINE

## 2018-07-31 PROCEDURE — 96360 HYDRATION IV INFUSION INIT: CPT

## 2018-07-31 PROCEDURE — 71045 X-RAY EXAM CHEST 1 VIEW: CPT | Performed by: EMERGENCY MEDICINE

## 2018-07-31 PROCEDURE — 85025 COMPLETE CBC W/AUTO DIFF WBC: CPT | Performed by: EMERGENCY MEDICINE

## 2018-07-31 PROCEDURE — 99284 EMERGENCY DEPT VISIT MOD MDM: CPT

## 2018-07-31 PROCEDURE — 87045 FECES CULTURE AEROBIC BACT: CPT | Performed by: EMERGENCY MEDICINE

## 2018-07-31 PROCEDURE — 87493 C DIFF AMPLIFIED PROBE: CPT | Performed by: EMERGENCY MEDICINE

## 2018-07-31 PROCEDURE — 87046 STOOL CULTR AEROBIC BACT EA: CPT | Performed by: EMERGENCY MEDICINE

## 2018-07-31 PROCEDURE — 80053 COMPREHEN METABOLIC PANEL: CPT | Performed by: EMERGENCY MEDICINE

## 2018-07-31 NOTE — ED PROVIDER NOTES
Patient Seen in: [facility] Emergency Department    History   Patient presents with:  Nausea/Vomiting/Diarrhea (gastrointestinal)    Stated Complaint: diarrhea    HPI    Patient is an 39-year-old male, presenting for evaluation of diarrhea.     Kristie Celi Rollins MD;  Location: [address] [place] [address] MANAGEMENT  4/28/2014: Umberto Isaacs NDL/CATH SPI DX/THER TKW      Comment: Procedure: INTRATHECAL PUMP TRIAL;  Surgeon:                Celi Rollins MD;  Location: [address] [place] [address]  Comment: Procedure: STIMULATOR TRIAL EPIDURAL LEAD;                 Surgeon: Anna Malone MD;  Location: [address] [place] [address] MANAGEMENT  2/3/2014: M-CONSTANCEAJ BY [facility] [address] [facility] 59  [facility] 5+ YR      Comment: Procedure: STIMULATOR TRIAL PERIPHERAL; TRIAL PERIPHERAL;                 Surgeon: Barrett Silva MD;  Location: [address] [place] [address] MANAGEMENT  4/2/2013: [facility]IENT [place] [facility]AT[facility] FOR IV ANT*      Comment: Procedure: TRANSFORAMINAL EPIDURAL - LUMBAR;                 [facility]  12/18/2013: Jena Noriega      Comment: Procedure: STIMULATOR TRIAL EPIDURAL LEAD;                 Surgeon: Gabby Browne MD;  Location: [facility] FOR PAIN MANAGEMENT  No date: [facility] grossly intact. There is no gross motor or sensory deficits identified.       ED Course     Labs Reviewed   COMP METABOLIC PANEL (14) - Abnormal; Notable for the following:        Result Value    Glucose 102 (*)     GFR, Non-African American 58 (*)     Nicole Please view results for these tests on the individual orders. STOOL CULTURE(P)   SHIGATOXIN     Xr Chest Ap Portable  (cpt=71045)    Result Date: 7/31/2018  PROCEDURE:  XR CHEST AP PORTABLE  (CPT=71045)  TECHNIQUE:  AP chest radiograph was obtained. diagnosis)    Disposition:  Discharge  7/31/2018 12:57 pm    Follow-up:  Jane [place] MD  [place] [address] [facility] [address] [place] [id number]  [phone]    Schedule an appointment as soon as possible for a visit in 2 days      Haylee Gutierres, [address] [place] [address]

## 2018-08-04 DIAGNOSIS — E78.00 PURE HYPERCHOLESTEROLEMIA: ICD-10-CM

## 2018-08-04 RX ORDER — ATORVASTATIN CALCIUM 10 MG/1
TABLET, FILM COATED ORAL
Qty: 90 TABLET | Refills: 0 | Status: SHIPPED | OUTPATIENT
Start: 2018-08-04 | End: 2018-11-07

## 2018-08-13 ENCOUNTER — OFFICE VISIT (OUTPATIENT)
Dept: FAMILY MEDICINE CLINIC | Facility: CLINIC | Age: 78
End: 2018-08-13
Payer: MEDICARE

## 2018-08-13 ENCOUNTER — ANESTHESIA EVENT (OUTPATIENT)
Dept: ENDOSCOPY | Facility: HOSPITAL | Age: 78
End: 2018-08-13
Payer: MEDICARE

## 2018-08-13 VITALS
HEART RATE: 85 BPM | SYSTOLIC BLOOD PRESSURE: 108 MMHG | OXYGEN SATURATION: 96 % | RESPIRATION RATE: 20 BRPM | TEMPERATURE: 98 F | WEIGHT: 117 LBS | HEIGHT: 70 IN | DIASTOLIC BLOOD PRESSURE: 70 MMHG | BODY MASS INDEX: 16.75 KG/M2

## 2018-08-13 DIAGNOSIS — I95.9 HYPOTENSION, UNSPECIFIED HYPOTENSION TYPE: ICD-10-CM

## 2018-08-13 DIAGNOSIS — D51.0 PERNICIOUS ANEMIA: ICD-10-CM

## 2018-08-13 DIAGNOSIS — K59.1 FUNCTIONAL DIARRHEA: Primary | ICD-10-CM

## 2018-08-13 DIAGNOSIS — H61.22 IMPACTED CERUMEN OF LEFT EAR: ICD-10-CM

## 2018-08-13 PROCEDURE — 99214 OFFICE O/P EST MOD 30 MIN: CPT | Performed by: FAMILY MEDICINE

## 2018-08-13 RX ORDER — CYANOCOBALAMIN 1000 UG/ML
INJECTION INTRAMUSCULAR; SUBCUTANEOUS
Qty: 30 ML | Refills: 0 | Status: SHIPPED | OUTPATIENT
Start: 2018-08-13 | End: 2019-10-23

## 2018-08-13 NOTE — PATIENT INSTRUCTIONS
Stop valsartan. If 3 readings over 140/90, message me to resume a new medication, likely losartan. Upper endoscopy tomorrow. Hopefully you will be eating better after that. Let me know if diarrhea persists after 2 weeks. Message me on August 28.

## 2018-08-13 NOTE — PROGRESS NOTES
This gentleman presents with 3 weeks of loose stool. He had 2 episodes of incontinence while asleep. This happened 2 nights in a row. He does not recall any new medications, diet, activities. He was seen in the ER. Lab workup was reviewed today.   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   • Problems with swallowing    • Sleep disturbance    • Stool incontinence    • Unspecified disorder of thyroid    • Unspecified essential hypertensio Procedure: INTRATHECAL PUMP TRIAL;  Surgeon:                Martine Hensley MD;  Location: [place] [address] MANAGEMENT  1/15/2015: INJECTION, W/WO CONTRAST, DX/THERAPEUTIC SUBST* N/A      Comment: Procedure: INTRATHECAL PUMP TRIAL;  Surge EXPERIENCED ANY*      Comment: Procedure: STIMULATOR TRIAL PERIPHERAL;                 Surgeon: Barrett Silva MD;  Location: [address] [place] [address] MANAGEMENT  4/28/2014: PATIENT DOCUMENTED NOT TO HAVE EXPERIENCED ANY*      Comment: Procedure STIMULATOR TRIAL PERIPHERAL;                 Surgeon: Bebeto Centeno MD;  Location: [address] [place] [address] MANAGEMENT  2/3/2014: PERCUT IMPLNT [address]      Comment: Procedure: STIMULATOR TRIAL PERIPHERAL;                 Surgeon: Cassandra Child TAKE 1 TABLET (88 MCG TOTAL) BY MOUTH DAILY.  Disp: 90 tablet Rfl: 3   Insulin Syringe-Needle U-100 (BD INSULIN SYRINGE ULTRAFINE) 31G X 5/16\" 1 ML Does not apply Misc Use for B12 injection Disp: 10 each Rfl: 2   Vitamin D3 (VITAMIN D3) 2000 UNITS Oral Cap

## 2018-08-13 NOTE — ANESTHESIA PREPROCEDURE EVALUATION
PRE-OP EVALUATION    Patient Name: Pranav Zuleta    Pre-op Diagnosis: Esophageal dysphagia [R13.10]    Procedure(s):  ESOPHAGOGASTRODUODENOSCOPY With Possible Dilation. ENDOSCOPIC ULTRASOUND.       Surgeon(s) and Role:     Eddi Wallis MD - Pr (VITAMIN D3) 2000 UNITS Oral Cap Take 1 capsule by mouth daily. Disp:  Rfl:        Allergies: Neomycin; Bactrim [Sulfamethoxazole W/Trimethoprim]; Sulfa Antibiotics; Versed;  Flonase [Fluticasone Propionate]      Anesthesia Evaluation    Patient summary rev LUMBAR;                 Surgeon: Jaspal Medeiros MD;  Location: [address] [place] [address] MANAGEMENT  4/28/2014: Melina PAGE & Shauna Harrison NDL/CATH SPI DX/THER MORGAN      Comment: Procedure: INTRATHECAL PUMP TRIAL;  Surgeon:                Jaspal Medeiros, PHYS [facility] 5+ YR      Comment: Procedure: STIMULATOR TRIAL EPIDURAL LEAD;                 Surgeon: Ivan Alva MD;  Location: [address] [place] [address] MANAGEMENT  2/3/2014: M-CONSTANCEASUNNI BY [facility] [address] [facility] 59  N [facility] 5+ YR      Comment: Procedure: S Comment: Procedure: STIMULATOR TRIAL PERIPHERAL;                 Surgeon: Ivan Alva MD;  Location: [address] [place] [address] MANAGEMENT  4/2/2013: [facility] [place] [facility] FOR IV ANT*      Comment: Procedure: TRANSFORAMINAL Halle Mazariegos Haider Lutz MD;  Location: [address] [place] [address] MANAGEMENT  12/18/2013: Enriqueta Mckay      Comment: Procedure: STIMULATOR TRIAL EPIDURAL LEAD;                 Surgeon: Alix Weston MD;  Location: [address] [place] [address]

## 2018-08-14 ENCOUNTER — SURGERY (OUTPATIENT)
Age: 78
End: 2018-08-14

## 2018-08-14 ENCOUNTER — ANESTHESIA (OUTPATIENT)
Dept: ENDOSCOPY | Facility: HOSPITAL | Age: 78
End: 2018-08-14
Payer: MEDICARE

## 2018-08-14 ENCOUNTER — HOSPITAL ENCOUNTER (OUTPATIENT)
Facility: HOSPITAL | Age: 78
Setting detail: HOSPITAL OUTPATIENT SURGERY
Discharge: HOME | End: 2018-08-14
Attending: INTERNAL MEDICINE | Admitting: INTERNAL MEDICINE
Payer: MEDICARE

## 2018-08-14 VITALS
WEIGHT: 112 LBS | BODY MASS INDEX: 16.03 KG/M2 | RESPIRATION RATE: 16 BRPM | OXYGEN SATURATION: 97 % | HEART RATE: 71 BPM | HEIGHT: 70 IN | SYSTOLIC BLOOD PRESSURE: 135 MMHG | DIASTOLIC BLOOD PRESSURE: 77 MMHG | TEMPERATURE: 98 F

## 2018-08-14 DIAGNOSIS — R13.19 ESOPHAGEAL DYSPHAGIA: ICD-10-CM

## 2018-08-14 PROCEDURE — 88305 TISSUE EXAM BY PATHOLOGIST: CPT | Performed by: INTERNAL MEDICINE

## 2018-08-14 PROCEDURE — 0DJ08ZZ INSPECTION OF UPPER INTESTINAL TRACT, VIA NATURAL OR ARTIFICIAL OPENING ENDOSCOPIC: ICD-10-PCS | Performed by: INTERNAL MEDICINE

## 2018-08-14 PROCEDURE — 0DB58ZX EXCISION OF ESOPHAGUS, VIA NATURAL OR ARTIFICIAL OPENING ENDOSCOPIC, DIAGNOSTIC: ICD-10-PCS | Performed by: INTERNAL MEDICINE

## 2018-08-14 PROCEDURE — 0D758ZZ DILATION OF ESOPHAGUS, VIA NATURAL OR ARTIFICIAL OPENING ENDOSCOPIC: ICD-10-PCS | Performed by: INTERNAL MEDICINE

## 2018-08-14 PROCEDURE — 88312 SPECIAL STAINS GROUP 1: CPT | Performed by: INTERNAL MEDICINE

## 2018-08-14 RX ORDER — SODIUM CHLORIDE, SODIUM LACTATE, POTASSIUM CHLORIDE, CALCIUM CHLORIDE 600; 310; 30; 20 MG/100ML; MG/100ML; MG/100ML; MG/100ML
INJECTION, SOLUTION INTRAVENOUS CONTINUOUS
Status: DISCONTINUED | OUTPATIENT
Start: 2018-08-14 | End: 2018-08-14

## 2018-08-14 RX ORDER — NALOXONE HYDROCHLORIDE 0.4 MG/ML
80 INJECTION, SOLUTION INTRAMUSCULAR; INTRAVENOUS; SUBCUTANEOUS AS NEEDED
Status: DISCONTINUED | OUTPATIENT
Start: 2018-08-14 | End: 2018-08-14

## 2018-08-14 NOTE — H&P
[address] [facility] CHACORTA Zuleta Patient Status:  Hospital Outpatient Surgery    3/8/1940 MRN [id number]   [facility] ENDOSCOPY Attending Haily Quan MD   Hosp Day # 0 PCP Bob Dawson MD     CC: 1.  Dysphagia LOCLZJ NDL/CATH SPI DX/THER ASU      Comment: Procedure: TRANSFORAMINAL EPIDURAL - LUMBAR;                 Surgeon: Tangela Lee MD;  Location: [address] [place] [address] MANAGEMENT  4/28/2014: Ryder Reece NDL/CATH SPI DX/THER Carlena Flax      Co Location: [address] [place] [address] MANAGEMENT  12/18/2013: MIMI BY KNEIA ACOSTA [facility] 5+ YR      Comment: Procedure: STIMULATOR TRIAL EPIDURAL LEAD;                 Surgeon: Gabby Browne MD;  Location: [address] [place] [address] PAIN MANAGEMENT  2/3/2014: PATIENT WITH PREOPERATIVE ORDER FOR IV ANTIBIO*      Comment: Procedure: STIMULATOR TRIAL PERIPHERAL;                 Surgeon: Lucien Jacinto MD;  Location: [address] [place] [address] MANAGEMENT  4/2/2013: PATIENT WITHOU Procedure: STIMULATOR TRIAL EPIDURAL LEAD;                 Surgeon: Sarah Anderson MD;  Location: [address] [place] [address] MANAGEMENT  12/18/2013: Jennifer Tolliver      Comment: Procedure: STIMULATOR TRIAL EPIDURAL LEAD; radiculitis     History of lumbar laminectomy for spinal cord decompression     Opiate use     S/P lumbar fusion     Spinal stenosis, lumbar region, without neurogenic claudication     Chronic pain     Failed back surgical syndrome     Spinal stenosis

## 2018-08-14 NOTE — ANESTHESIA POSTPROCEDURE EVALUATION
[address] [place] [address] Patient Status:  Hospital Outpatient Surgery   Age/Gender [de-identified]year old male MRN [id number]   Location [facility]. Attending Yasmeen Julian MD   Hosp Day # 0 PCP Anh Avila MD       Anesthesia Post-

## 2018-08-14 NOTE — OPERATIVE REPORT
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   THERAPEUTICS: Random esophageal biopsy was perfo

## 2018-08-20 NOTE — PROGRESS NOTES
Date: 2018    To: Leonardo Zuleta  : 3/8/1940    I hope this letter finds you doing well. I am writing to inform you of the following:      The biopsies obtained at the time of your recent endoscopic procedure were benign and showed no evidence

## 2018-09-17 ENCOUNTER — PATIENT MESSAGE (OUTPATIENT)
Dept: FAMILY MEDICINE CLINIC | Facility: CLINIC | Age: 78
End: 2018-09-17

## 2018-09-17 DIAGNOSIS — F06.8 ANXIETY DISORDER DUE TO MULTIPLE MEDICAL PROBLEMS: ICD-10-CM

## 2018-09-17 DIAGNOSIS — F51.01 PRIMARY INSOMNIA: ICD-10-CM

## 2018-09-17 RX ORDER — CLONAZEPAM 2 MG/1
2 TABLET ORAL NIGHTLY
Qty: 30 TABLET | Refills: 2 | Status: SHIPPED | OUTPATIENT
Start: 2018-09-17 | End: 2018-12-12

## 2018-09-17 NOTE — TELEPHONE ENCOUNTER
From: Verenice John  To: Jane [place] MD  Sent: 9/17/2018 12:35 PM CDT  Subject: Prescription Question    Dr Pamela Holley,  I do not need a refill on Cyanocobalamin (B-12) at this time.  I still have one refill yet on the current subscription but I stil

## 2018-11-06 DIAGNOSIS — E03.9 HYPOTHYROIDISM, UNSPECIFIED TYPE: Chronic | ICD-10-CM

## 2018-11-06 RX ORDER — LEVOTHYROXINE SODIUM 88 UG/1
TABLET ORAL
Qty: 90 TABLET | Refills: 3 | Status: SHIPPED | OUTPATIENT
Start: 2018-11-06 | End: 2020-01-27

## 2018-11-07 ENCOUNTER — TELEPHONE (OUTPATIENT)
Dept: FAMILY MEDICINE CLINIC | Facility: CLINIC | Age: 78
End: 2018-11-07

## 2018-11-07 DIAGNOSIS — E78.00 PURE HYPERCHOLESTEROLEMIA: ICD-10-CM

## 2018-11-07 RX ORDER — ATORVASTATIN CALCIUM 10 MG/1
TABLET, FILM COATED ORAL
Qty: 90 TABLET | Refills: 0 | Status: SHIPPED | OUTPATIENT
Start: 2018-11-07 | End: 2019-04-01

## 2018-11-07 NOTE — TELEPHONE ENCOUNTER
Pt calling because he received a message in my chart stating he requested levothyroxine and atorvastatin  Pt states he did not and does not need medication.   Please afvise

## 2018-11-07 NOTE — TELEPHONE ENCOUNTER
Called pt - advised pt that atorvastatin and levothyroxine were sent today and yesterday and that refill requests were generated by the pharmacy. Patient verbalized understanding and had no further questions.

## 2018-12-12 ENCOUNTER — PATIENT MESSAGE (OUTPATIENT)
Dept: FAMILY MEDICINE CLINIC | Facility: CLINIC | Age: 78
End: 2018-12-12

## 2018-12-12 DIAGNOSIS — F51.01 PRIMARY INSOMNIA: ICD-10-CM

## 2018-12-12 DIAGNOSIS — F06.8 ANXIETY DISORDER DUE TO MULTIPLE MEDICAL PROBLEMS: ICD-10-CM

## 2018-12-12 RX ORDER — CLONAZEPAM 2 MG/1
2 TABLET ORAL NIGHTLY
Qty: 30 TABLET | Refills: 2 | Status: SHIPPED | OUTPATIENT
Start: 2018-12-12 | End: 2019-07-01

## 2018-12-12 NOTE — TELEPHONE ENCOUNTER
From: Verenice John  To: Jane [place] MD  Sent: 12/12/2018 9:53 AM CST  Subject: Prescription Question    Dr Pamela Holley,  Can I get a prescription refill for Clonazepam (2 mg) with refills? I need medication for sleep.   Thank You,  Arjun Paniagua

## 2019-03-25 ENCOUNTER — PATIENT MESSAGE (OUTPATIENT)
Dept: FAMILY MEDICINE CLINIC | Facility: CLINIC | Age: 79
End: 2019-03-25

## 2019-03-25 NOTE — TELEPHONE ENCOUNTER
From: Deepa Carlin  To: Grayson Vazquez MD  Sent: 3/25/2019 9:03 AM CDT  Subject: Prescription Question    Dr. Frederico Dandy,  I would like a refill prescription for Clonazepam (2 mg) with refills. It helps me sleep at night.   Thank You,  Annamaria Drummond

## 2019-04-01 ENCOUNTER — OFFICE VISIT (OUTPATIENT)
Dept: FAMILY MEDICINE CLINIC | Facility: CLINIC | Age: 79
End: 2019-04-01
Payer: MEDICARE

## 2019-04-01 ENCOUNTER — LAB ENCOUNTER (OUTPATIENT)
Dept: LAB | Age: 79
End: 2019-04-01
Attending: FAMILY MEDICINE
Payer: MEDICARE

## 2019-04-01 ENCOUNTER — HOSPITAL ENCOUNTER (OUTPATIENT)
Dept: GENERAL RADIOLOGY | Age: 79
Discharge: HOME | End: 2019-04-01
Attending: FAMILY MEDICINE
Payer: MEDICARE

## 2019-04-01 VITALS
OXYGEN SATURATION: 98 % | SYSTOLIC BLOOD PRESSURE: 120 MMHG | WEIGHT: 125 LBS | DIASTOLIC BLOOD PRESSURE: 80 MMHG | HEART RATE: 60 BPM | RESPIRATION RATE: 18 BRPM | TEMPERATURE: 98 F | BODY MASS INDEX: 17.9 KG/M2 | HEIGHT: 70 IN

## 2019-04-01 DIAGNOSIS — E03.9 HYPOTHYROIDISM, UNSPECIFIED TYPE: ICD-10-CM

## 2019-04-01 DIAGNOSIS — R20.2 TINGLING IN EXTREMITIES: Primary | ICD-10-CM

## 2019-04-01 DIAGNOSIS — E55.9 VITAMIN D DEFICIENCY: ICD-10-CM

## 2019-04-01 DIAGNOSIS — K59.03 DRUG-INDUCED CONSTIPATION: ICD-10-CM

## 2019-04-01 DIAGNOSIS — E03.9 HYPOTHYROIDISM, UNSPECIFIED TYPE: Chronic | ICD-10-CM

## 2019-04-01 DIAGNOSIS — R13.13 PHARYNGEAL DYSPHAGIA: ICD-10-CM

## 2019-04-01 DIAGNOSIS — R20.2 TINGLING IN EXTREMITIES: ICD-10-CM

## 2019-04-01 DIAGNOSIS — R10.2 SUPRAPUBIC ABDOMINAL PAIN: ICD-10-CM

## 2019-04-01 DIAGNOSIS — E53.8 VITAMIN B12 DEFICIENCY: ICD-10-CM

## 2019-04-01 DIAGNOSIS — E78.00 PURE HYPERCHOLESTEROLEMIA: ICD-10-CM

## 2019-04-01 PROBLEM — R63.0 LOSS OF APPETITE: Status: RESOLVED | Noted: 2018-04-25 | Resolved: 2019-04-01

## 2019-04-01 PROBLEM — R10.32 LEFT LOWER QUADRANT PAIN: Status: RESOLVED | Noted: 2018-04-25 | Resolved: 2019-04-01

## 2019-04-01 PROBLEM — F32.9 REACTIVE DEPRESSION: Status: RESOLVED | Noted: 2017-09-18 | Resolved: 2019-04-01

## 2019-04-01 PROCEDURE — 80053 COMPREHEN METABOLIC PANEL: CPT

## 2019-04-01 PROCEDURE — 84443 ASSAY THYROID STIM HORMONE: CPT

## 2019-04-01 PROCEDURE — 85025 COMPLETE CBC W/AUTO DIFF WBC: CPT

## 2019-04-01 PROCEDURE — 82607 VITAMIN B-12: CPT

## 2019-04-01 PROCEDURE — 81003 URINALYSIS AUTO W/O SCOPE: CPT | Performed by: FAMILY MEDICINE

## 2019-04-01 PROCEDURE — 84439 ASSAY OF FREE THYROXINE: CPT

## 2019-04-01 PROCEDURE — 82306 VITAMIN D 25 HYDROXY: CPT

## 2019-04-01 PROCEDURE — 84425 ASSAY OF VITAMIN B-1: CPT

## 2019-04-01 PROCEDURE — 74019 RADEX ABDOMEN 2 VIEWS: CPT | Performed by: FAMILY MEDICINE

## 2019-04-01 PROCEDURE — 83036 HEMOGLOBIN GLYCOSYLATED A1C: CPT

## 2019-04-01 PROCEDURE — 36415 COLL VENOUS BLD VENIPUNCTURE: CPT

## 2019-04-01 PROCEDURE — 99214 OFFICE O/P EST MOD 30 MIN: CPT | Performed by: FAMILY MEDICINE

## 2019-04-01 RX ORDER — CLONAZEPAM 2 MG/1
2 TABLET ORAL NIGHTLY PRN
Qty: 30 TABLET | Refills: 2 | Status: SHIPPED | OUTPATIENT
Start: 2019-04-01 | End: 2019-04-15

## 2019-04-01 RX ORDER — ATORVASTATIN CALCIUM 10 MG/1
TABLET, FILM COATED ORAL
Qty: 90 TABLET | Refills: 3 | Status: SHIPPED | OUTPATIENT
Start: 2019-04-01 | End: 2020-07-14

## 2019-04-01 RX ORDER — MELATONIN 10 MG
15 CAPSULE ORAL
COMMUNITY

## 2019-04-01 NOTE — PROGRESS NOTES
Patient here with initial complaint of lower abdominal pain. He has had chronic constipation. He does use fentanyl for chronic pain management. He said no gross hematuria reported. He does suffer from chronic back pain. His difficulty swallowing.   Cheryle Levins Unspecified gastritis and gastroduodenitis without mention of hemorrhage    • Unspecified hypothyroidism    • Weight loss      PAST SURGICAL HISTORY:  Past Surgical History:   Procedure Laterality Date   • ANTERIOR CERVICAL FUSION BG & INST 3 LEVEL N/A 5/2 Yanet Paris MD at [facility] N/A 2/3/2014    Performed by Yanet Paris MD at [address] [facility] [address]     • TRANSFORAMINAL EPIDURAL - LUMBAR Left 4/2/2013    Performed by Jose Barriga capsule Rfl: 5   Vitamin D3 (VITAMIN D3) 2000 UNITS Oral Cap Take 1 capsule by mouth daily. Disp:  Rfl:      ALLERGIES:   Neomycin; Bactrim [Sulfamethoxazole W/Trimethoprim]; Sulfa Antibiotics; Versed;  Flonase [Fluticasone Propionate]  FAMILY HISTORY  Fami

## 2019-04-02 ENCOUNTER — TELEPHONE (OUTPATIENT)
Dept: FAMILY MEDICINE CLINIC | Facility: CLINIC | Age: 79
End: 2019-04-02

## 2019-04-02 DIAGNOSIS — R73.03 PREDIABETES: ICD-10-CM

## 2019-04-02 DIAGNOSIS — E03.9 HYPOTHYROIDISM, UNSPECIFIED TYPE: Primary | Chronic | ICD-10-CM

## 2019-04-02 RX ORDER — LEVOTHYROXINE SODIUM 0.1 MG/1
100 TABLET ORAL
Qty: 90 TABLET | Refills: 0 | Status: SHIPPED | OUTPATIENT
Start: 2019-04-02 | End: 2019-06-23

## 2019-04-02 NOTE — TELEPHONE ENCOUNTER
PC to pt and also spoke with wife Darvin Meraz. Reveiwed test results and pt verbalized understanding           ----- Message from Dandre Spain MD sent at 4/2/2019  7:57 AM CDT -----  Thyroid level is mildly off.   Increase his dose of Synthroid to 100 mcg an

## 2019-04-30 ENCOUNTER — ANESTHESIA EVENT (OUTPATIENT)
Dept: ENDOSCOPY | Facility: HOSPITAL | Age: 79
End: 2019-04-30
Payer: MEDICARE

## 2019-04-30 ENCOUNTER — ANESTHESIA (OUTPATIENT)
Dept: ENDOSCOPY | Facility: HOSPITAL | Age: 79
End: 2019-04-30
Payer: MEDICARE

## 2019-04-30 ENCOUNTER — HOSPITAL ENCOUNTER (OUTPATIENT)
Facility: HOSPITAL | Age: 79
Setting detail: HOSPITAL OUTPATIENT SURGERY
Discharge: HOME | End: 2019-04-30
Attending: INTERNAL MEDICINE | Admitting: INTERNAL MEDICINE
Payer: MEDICARE

## 2019-04-30 VITALS
RESPIRATION RATE: 16 BRPM | HEART RATE: 64 BPM | BODY MASS INDEX: 16.89 KG/M2 | OXYGEN SATURATION: 98 % | TEMPERATURE: 97 F | DIASTOLIC BLOOD PRESSURE: 85 MMHG | WEIGHT: 118 LBS | SYSTOLIC BLOOD PRESSURE: 147 MMHG | HEIGHT: 70 IN

## 2019-04-30 DIAGNOSIS — R13.10 DYSPHAGIA, UNSPECIFIED TYPE: ICD-10-CM

## 2019-04-30 DIAGNOSIS — R10.30 LOWER ABDOMINAL PAIN: ICD-10-CM

## 2019-04-30 DIAGNOSIS — Z86.010 HISTORY OF COLON POLYPS: ICD-10-CM

## 2019-04-30 PROCEDURE — 0DB98ZX EXCISION OF DUODENUM, VIA NATURAL OR ARTIFICIAL OPENING ENDOSCOPIC, DIAGNOSTIC: ICD-10-PCS | Performed by: INTERNAL MEDICINE

## 2019-04-30 PROCEDURE — 0DB78ZX EXCISION OF STOMACH, PYLORUS, VIA NATURAL OR ARTIFICIAL OPENING ENDOSCOPIC, DIAGNOSTIC: ICD-10-PCS | Performed by: INTERNAL MEDICINE

## 2019-04-30 PROCEDURE — 0D758ZZ DILATION OF ESOPHAGUS, VIA NATURAL OR ARTIFICIAL OPENING ENDOSCOPIC: ICD-10-PCS | Performed by: INTERNAL MEDICINE

## 2019-04-30 PROCEDURE — 0DB58ZX EXCISION OF ESOPHAGUS, VIA NATURAL OR ARTIFICIAL OPENING ENDOSCOPIC, DIAGNOSTIC: ICD-10-PCS | Performed by: INTERNAL MEDICINE

## 2019-04-30 PROCEDURE — 88305 TISSUE EXAM BY PATHOLOGIST: CPT | Performed by: INTERNAL MEDICINE

## 2019-04-30 RX ORDER — SODIUM CHLORIDE, SODIUM LACTATE, POTASSIUM CHLORIDE, CALCIUM CHLORIDE 600; 310; 30; 20 MG/100ML; MG/100ML; MG/100ML; MG/100ML
INJECTION, SOLUTION INTRAVENOUS CONTINUOUS
Status: CANCELLED | OUTPATIENT
Start: 2019-04-30

## 2019-04-30 RX ORDER — SODIUM CHLORIDE, SODIUM LACTATE, POTASSIUM CHLORIDE, CALCIUM CHLORIDE 600; 310; 30; 20 MG/100ML; MG/100ML; MG/100ML; MG/100ML
INJECTION, SOLUTION INTRAVENOUS CONTINUOUS
Status: DISCONTINUED | OUTPATIENT
Start: 2019-04-30 | End: 2019-04-30

## 2019-04-30 RX ORDER — NALOXONE HYDROCHLORIDE 0.4 MG/ML
80 INJECTION, SOLUTION INTRAMUSCULAR; INTRAVENOUS; SUBCUTANEOUS AS NEEDED
Status: CANCELLED | OUTPATIENT
Start: 2019-04-30 | End: 2019-04-30

## 2019-04-30 NOTE — H&P
[address] [facility] CHACORTA Schuleredwina Patient Status:  Hospital Outpatient Surgery    3/8/1940 MRN [id number]   [facility] ENDOSCOPY Attending Yelitza Tan MD   Date 2019 PCP Paige Balbuena MD     CC: Dysphagia/d Performed by Petar Ayala MD at [facility] ENDOSCOPY   • ESOPHAGOGASTRODUODENOSCOPY (EGD) N/A 6/14/2016    Performed by Petar Ayala MD at [facility] ENDOSCOPY   • HERNIA SURGERY  2014   • [address] N/A 5/22/2014    Performed by Pool Hairston, Disorder Father    • Hypertension Mother    • Heart Disorder Mother       reports that he has never smoked. He has never used smokeless tobacco. He reports that he does not drink alcohol or use drugs.     Allergies:    Neomycin                SWELLING, PAIN EKG     Right lower quadrant abdominal pain     Esophageal dysphagia     Common bile duct dilation      Dysphagia/dyspepsia    Plan:  EGD    Dali Connolly  4/30/2019  11:22 AM

## 2019-04-30 NOTE — ANESTHESIA POSTPROCEDURE EVALUATION
[address] [place] [address] Patient Status:  Hospital Outpatient Surgery   Age/Gender 78year old male MRN [id number]   Location [facility]. Attending Stephanie Bowen MD   Hosp Day # 0 PCP Taty Alvarez MD       Anesthesia Post-

## 2019-04-30 NOTE — OPERATIVE REPORT
Tianna Sigala Patient Status:  Hospital Outpatient Surgery    3/8/1940 MRN [id number]   [facility] ENDOSCOPY Attending Debbie Carlson MD   Date 2019 PCP Alfredo Gutiérrez MD     PREOPERATIVE DIAGNOSIS/INDICATION: Dysphagia/dys

## 2019-04-30 NOTE — ANESTHESIA PREPROCEDURE EVALUATION
PRE-OP EVALUATION    Patient Name: Pranav Zuleta    Pre-op Diagnosis: Dysphagia, unspecified type [R13.10]  Lower abdominal pain [R10.30]  History of colon polyps [Z86.010]    Procedure(s):  ESOPHAGOGASTRODUODENOSCOPY WITH POSSIBLE BALLOON DILATION mouth daily. Disp:  Rfl:        Allergies: Neomycin; Bactrim [Sulfamethoxazole W/Trimethoprim]; Sulfa Antibiotics; Versed; Flonase [Fluticasone Propionate]      Anesthesia Evaluation    Patient summary reviewed.     Anesthetic Complications           GI/Hep Performed by Azalea Law DDS at [facility] MAIN OR   • NEEDLE BIOPSY LIVER     • ORTHOPEDIC SURG (PBP)  5/2013 8/2013    Lumbar 4 - 5    • OTHER  08    esophagogastroduodenoscopy   • OTHER      jaw fracture/repair, hardware remains   • SIGMOIDOSCOPY,DIAGNOSTI verified and patient meets guidelines.           Plan/risks discussed with: patient                Present on Admission:  **None**

## 2019-05-01 NOTE — PROGRESS NOTES
Date: 2019    To: Gibran Zuleat  : 3/8/1940    I hope this letter finds you doing well. I am writing to inform you of the following:      The biopsies obtained at the time of your recent endoscopic procedure were benign and showed no evidence o

## 2019-05-14 ENCOUNTER — TELEPHONE (OUTPATIENT)
Dept: FAMILY MEDICINE CLINIC | Facility: CLINIC | Age: 79
End: 2019-05-14

## 2019-05-14 NOTE — TELEPHONE ENCOUNTER
LMTCB    Notes recorded by rKysta Albarran MD on 4/2/2019 at 7:57 AM CDT  Thyroid level is mildly off.  Increase his dose of Synthroid to 100 mcg and recheck TSH in 6 weeks.  Hemoglobin A1c has increased from 5.7 to a level of 6.1.  Impaired fasting gluco

## 2019-05-14 NOTE — TELEPHONE ENCOUNTER
Patient called and stated that he wanted to know when is he supposed to do his A1C  He thought [address] wanted him to do it in July. I didn't see any notes. He also wanted to confirm his thyroid medication dosage until he does his labs again.   He believes he

## 2019-05-21 ENCOUNTER — PATIENT MESSAGE (OUTPATIENT)
Dept: FAMILY MEDICINE CLINIC | Facility: CLINIC | Age: 79
End: 2019-05-21

## 2019-05-21 NOTE — TELEPHONE ENCOUNTER
From: Ga Kiser  To: Romana Liu MD  Sent: 5/21/2019 12:53 PM CDT  Subject: Non-Urgent Medical Question    Dr. Mary Dubose called me on April 2, 2019 increasing my Levothyroxine from 80 MCG to 100 MCG and said I should have a thyroi

## 2019-05-29 ENCOUNTER — APPOINTMENT (OUTPATIENT)
Dept: LAB | Age: 79
End: 2019-05-29
Attending: FAMILY MEDICINE
Payer: MEDICARE

## 2019-05-29 DIAGNOSIS — E03.9 HYPOTHYROIDISM, UNSPECIFIED TYPE: Chronic | ICD-10-CM

## 2019-05-29 PROCEDURE — 84439 ASSAY OF FREE THYROXINE: CPT

## 2019-05-29 PROCEDURE — 84443 ASSAY THYROID STIM HORMONE: CPT

## 2019-05-29 PROCEDURE — 36415 COLL VENOUS BLD VENIPUNCTURE: CPT

## 2019-05-30 DIAGNOSIS — E03.9 HYPOTHYROIDISM, UNSPECIFIED TYPE: Primary | Chronic | ICD-10-CM

## 2019-06-24 RX ORDER — LEVOTHYROXINE SODIUM 0.1 MG/1
100 TABLET ORAL
Qty: 90 TABLET | Refills: 1 | Status: SHIPPED | OUTPATIENT
Start: 2019-06-24 | End: 2020-07-14 | Stop reason: ALTCHOICE

## 2019-07-01 NOTE — TELEPHONE ENCOUNTER
Dr. Murray Cueto,  See below and advise  Last refill 12/12/18 - total of 3 refills  Last office visit 4/1/19

## 2019-07-01 NOTE — TELEPHONE ENCOUNTER
From: Adeline Lcay  To: Merissa Vazquez MD  Sent: 7/1/2019 10:17 AM CDT  Subject: Prescription Question    Dr. Maryjane Stephenson,  I need a prescription refill for Clonazepam (2MG) with refills. I need it to sleep at night.   Thank You,  Julia Centeno

## 2019-07-03 ENCOUNTER — LAB ENCOUNTER (OUTPATIENT)
Dept: LAB | Age: 79
End: 2019-07-03
Attending: FAMILY MEDICINE
Payer: MEDICARE

## 2019-07-03 DIAGNOSIS — E03.9 HYPOTHYROIDISM, UNSPECIFIED TYPE: Chronic | ICD-10-CM

## 2019-07-03 DIAGNOSIS — R73.03 PREDIABETES: ICD-10-CM

## 2019-07-03 DIAGNOSIS — R10.30 LOWER ABDOMINAL PAIN: ICD-10-CM

## 2019-07-03 LAB
ALBUMIN SERPL-MCNC: 3.5 G/DL (ref 3.4–5)
ALBUMIN/GLOB SERPL: 1 {RATIO} (ref 1–2)
ALP LIVER SERPL-CCNC: 70 U/L (ref 45–117)
ALT SERPL-CCNC: 15 U/L (ref 16–61)
ANION GAP SERPL CALC-SCNC: 6 MMOL/L (ref 0–18)
AST SERPL-CCNC: 15 U/L (ref 15–37)
BASOPHILS # BLD AUTO: 0.02 X10(3) UL (ref 0–0.2)
BASOPHILS NFR BLD AUTO: 0.4 %
BILIRUB SERPL-MCNC: 1.3 MG/DL (ref 0.1–2)
BUN BLD-MCNC: 20 MG/DL (ref 7–18)
BUN/CREAT SERPL: 20 (ref 10–20)
CALCIUM BLD-MCNC: 9 MG/DL (ref 8.5–10.1)
CHLORIDE SERPL-SCNC: 103 MMOL/L (ref 98–112)
CO2 SERPL-SCNC: 30 MMOL/L (ref 21–32)
CREAT BLD-MCNC: 1 MG/DL (ref 0.7–1.3)
DEPRECATED RDW RBC AUTO: 49.2 FL (ref 35.1–46.3)
EOSINOPHIL # BLD AUTO: 0.15 X10(3) UL (ref 0–0.7)
EOSINOPHIL NFR BLD AUTO: 2.9 %
ERYTHROCYTE [DISTWIDTH] IN BLOOD BY AUTOMATED COUNT: 13.8 % (ref 11–15)
EST. AVERAGE GLUCOSE BLD GHB EST-MCNC: 120 MG/DL (ref 68–126)
GLOBULIN PLAS-MCNC: 3.5 G/DL (ref 2.8–4.4)
GLUCOSE BLD-MCNC: 89 MG/DL (ref 70–99)
HBA1C MFR BLD HPLC: 5.8 % (ref ?–5.7)
HCT VFR BLD AUTO: 41.6 % (ref 39–53)
HGB BLD-MCNC: 13.6 G/DL (ref 13–17.5)
IMM GRANULOCYTES # BLD AUTO: 0 X10(3) UL (ref 0–1)
IMM GRANULOCYTES NFR BLD: 0 %
LYMPHOCYTES # BLD AUTO: 1.75 X10(3) UL (ref 1–4)
LYMPHOCYTES NFR BLD AUTO: 33.5 %
M PROTEIN MFR SERPL ELPH: 7 G/DL (ref 6.4–8.2)
MCH RBC QN AUTO: 31.6 PG (ref 26–34)
MCHC RBC AUTO-ENTMCNC: 32.7 G/DL (ref 31–37)
MCV RBC AUTO: 96.5 FL (ref 80–100)
MONOCYTES # BLD AUTO: 0.47 X10(3) UL (ref 0.1–1)
MONOCYTES NFR BLD AUTO: 9 %
NEUTROPHILS # BLD AUTO: 2.83 X10 (3) UL (ref 1.5–7.7)
NEUTROPHILS # BLD AUTO: 2.83 X10(3) UL (ref 1.5–7.7)
NEUTROPHILS NFR BLD AUTO: 54.2 %
OSMOLALITY SERPL CALC.SUM OF ELEC: 290 MOSM/KG (ref 275–295)
PLATELET # BLD AUTO: 147 10(3)UL (ref 150–450)
POTASSIUM SERPL-SCNC: 3.7 MMOL/L (ref 3.5–5.1)
RBC # BLD AUTO: 4.31 X10(6)UL (ref 3.8–5.8)
SODIUM SERPL-SCNC: 139 MMOL/L (ref 136–145)
T4 FREE SERPL-MCNC: 1.7 NG/DL (ref 0.8–1.7)
TSI SER-ACNC: 0.15 MIU/ML (ref 0.36–3.74)
WBC # BLD AUTO: 5.2 X10(3) UL (ref 4–11)

## 2019-07-03 PROCEDURE — 80053 COMPREHEN METABOLIC PANEL: CPT

## 2019-07-03 PROCEDURE — 84443 ASSAY THYROID STIM HORMONE: CPT

## 2019-07-03 PROCEDURE — 84439 ASSAY OF FREE THYROXINE: CPT

## 2019-07-03 PROCEDURE — 85025 COMPLETE CBC W/AUTO DIFF WBC: CPT

## 2019-07-03 PROCEDURE — 83036 HEMOGLOBIN GLYCOSYLATED A1C: CPT

## 2019-07-03 PROCEDURE — 36415 COLL VENOUS BLD VENIPUNCTURE: CPT

## 2019-09-29 ENCOUNTER — PATIENT MESSAGE (OUTPATIENT)
Dept: FAMILY MEDICINE CLINIC | Facility: CLINIC | Age: 79
End: 2019-09-29

## 2019-09-29 DIAGNOSIS — F06.8 ANXIETY DISORDER DUE TO MULTIPLE MEDICAL PROBLEMS: ICD-10-CM

## 2019-09-29 DIAGNOSIS — F51.01 PRIMARY INSOMNIA: ICD-10-CM

## 2019-09-30 RX ORDER — CLONAZEPAM 2 MG/1
2 TABLET ORAL NIGHTLY
Qty: 30 TABLET | Refills: 2 | Status: SHIPPED | OUTPATIENT
Start: 2019-09-30 | End: 2019-12-26

## 2019-09-30 NOTE — TELEPHONE ENCOUNTER
From: Ga Kiser  To: Romana Liu MD  Sent: 9/29/2019 6:50 PM CDT  Subject: Prescription Question    Dr. Amaya Bryant,  I request a prescription for Clonazepam (2MG) with refills to help me sleep.  Could you please fax the prescription to [facility]

## 2019-09-30 NOTE — TELEPHONE ENCOUNTER
Pt requesting refill on clonazepam 2mg, last refill 7/1/19 #30 with 2 refills. Last OV 4/1/19. Please approve or deny.

## 2019-10-23 DIAGNOSIS — D51.0 PERNICIOUS ANEMIA: ICD-10-CM

## 2019-10-23 RX ORDER — CYANOCOBALAMIN 1000 UG/ML
INJECTION INTRAMUSCULAR; SUBCUTANEOUS
Qty: 3 ML | Refills: 9 | Status: SHIPPED | OUTPATIENT
Start: 2019-10-23 | End: 2020-12-21

## 2020-01-24 ENCOUNTER — PATIENT MESSAGE (OUTPATIENT)
Dept: FAMILY MEDICINE CLINIC | Facility: CLINIC | Age: 80
End: 2020-01-24

## 2020-01-24 DIAGNOSIS — F06.8 ANXIETY DISORDER DUE TO MULTIPLE MEDICAL PROBLEMS: ICD-10-CM

## 2020-01-24 DIAGNOSIS — F51.01 PRIMARY INSOMNIA: ICD-10-CM

## 2020-01-24 RX ORDER — CLONAZEPAM 2 MG/1
2 TABLET ORAL NIGHTLY
Qty: 30 TABLET | Refills: 0 | Status: SHIPPED | OUTPATIENT
Start: 2020-01-24 | End: 2020-02-24

## 2020-01-24 NOTE — TELEPHONE ENCOUNTER
Dr. Brandon Cha,  See refill request.   Last refill 12/26/19  Last office visit 4/1/19.   Medication pended

## 2020-01-24 NOTE — TELEPHONE ENCOUNTER
From: Geoff Zuleta  To: Evin Mazariegos MD  Sent: 1/24/2020 9:43 AM CST  Subject: Prescription Question    Dr. Luis Eduardo Sánchez,  I request a prescription for Clonazejpam (2 mg) with refills to help me sleep.  Could you please FAX the prescription to the [facility] p

## 2020-01-25 DIAGNOSIS — E03.9 HYPOTHYROIDISM, UNSPECIFIED TYPE: Chronic | ICD-10-CM

## 2020-01-27 RX ORDER — LEVOTHYROXINE SODIUM 88 UG/1
88 TABLET ORAL
Qty: 90 TABLET | Refills: 1 | Status: SHIPPED | OUTPATIENT
Start: 2020-01-27 | End: 2020-04-23

## 2020-02-24 ENCOUNTER — PATIENT MESSAGE (OUTPATIENT)
Dept: FAMILY MEDICINE CLINIC | Facility: CLINIC | Age: 80
End: 2020-02-24

## 2020-02-24 DIAGNOSIS — F06.8 ANXIETY DISORDER DUE TO MULTIPLE MEDICAL PROBLEMS: ICD-10-CM

## 2020-02-24 DIAGNOSIS — F51.01 PRIMARY INSOMNIA: ICD-10-CM

## 2020-02-24 RX ORDER — CLONAZEPAM 2 MG/1
2 TABLET ORAL NIGHTLY
Qty: 30 TABLET | Refills: 0 | Status: SHIPPED | OUTPATIENT
Start: 2020-02-24 | End: 2020-03-23

## 2020-02-24 NOTE — TELEPHONE ENCOUNTER
From: Demetrio Zuleta  To: Teresita Marion MD  Sent: 2/24/2020 9:54 AM CST  Subject: Prescription Question    Dr. Renetta Puentes,  I request a prescription refill for Clonazepam (2 mg) with refills to help me sleep.  Could you please Fax the prescription to the

## 2020-03-23 ENCOUNTER — PATIENT MESSAGE (OUTPATIENT)
Dept: FAMILY MEDICINE CLINIC | Facility: CLINIC | Age: 80
End: 2020-03-23

## 2020-03-23 DIAGNOSIS — F51.01 PRIMARY INSOMNIA: ICD-10-CM

## 2020-03-23 DIAGNOSIS — F06.8 ANXIETY DISORDER DUE TO MULTIPLE MEDICAL PROBLEMS: ICD-10-CM

## 2020-03-23 RX ORDER — CLONAZEPAM 2 MG/1
2 TABLET ORAL NIGHTLY
Qty: 30 TABLET | Refills: 2 | Status: SHIPPED | OUTPATIENT
Start: 2020-03-23 | End: 2020-07-14

## 2020-03-23 NOTE — TELEPHONE ENCOUNTER
From: Matilde Zuleta  To: Fidel Scott MD  Sent: 3/23/2020 10:00 AM CDT  Subject: Prescription Question    Dr. Tommy Mcfarland,  I request a refill prescription for Clonazepam (2 mg) with refills to help me sleep.   Thank You,  Rosa Macedo

## 2020-04-23 DIAGNOSIS — E03.9 HYPOTHYROIDISM, UNSPECIFIED TYPE: Chronic | ICD-10-CM

## 2020-04-23 RX ORDER — LEVOTHYROXINE SODIUM 88 UG/1
88 TABLET ORAL
Qty: 90 TABLET | Refills: 1 | Status: SHIPPED | OUTPATIENT
Start: 2020-04-23 | End: 2020-10-21

## 2020-06-28 ENCOUNTER — PATIENT MESSAGE (OUTPATIENT)
Dept: FAMILY MEDICINE CLINIC | Facility: CLINIC | Age: 80
End: 2020-06-28

## 2020-06-29 NOTE — TELEPHONE ENCOUNTER
From: Ilya Zuleta  To: Dandre Spain MD  Sent: 6/28/2020 1:57 PM CDT  Subject: Prescription Question    Dr. Nae López,  I request a refill prescription for Clonazepam (2 mg) with refills to help me sleep.   Thank You,  Luis Miguel Terrazas

## 2020-07-06 ENCOUNTER — APPOINTMENT (OUTPATIENT)
Dept: CT IMAGING | Age: 80
End: 2020-07-06
Attending: EMERGENCY MEDICINE
Payer: MEDICARE

## 2020-07-06 ENCOUNTER — APPOINTMENT (OUTPATIENT)
Dept: GENERAL RADIOLOGY | Age: 80
End: 2020-07-06
Attending: EMERGENCY MEDICINE
Payer: MEDICARE

## 2020-07-06 ENCOUNTER — HOSPITAL ENCOUNTER (EMERGENCY)
Age: 80
Discharge: HOME | End: 2020-07-06
Attending: EMERGENCY MEDICINE
Payer: MEDICARE

## 2020-07-06 ENCOUNTER — TELEPHONE (OUTPATIENT)
Dept: FAMILY MEDICINE CLINIC | Facility: CLINIC | Age: 80
End: 2020-07-06

## 2020-07-06 VITALS
BODY MASS INDEX: 16.61 KG/M2 | HEART RATE: 73 BPM | RESPIRATION RATE: 16 BRPM | SYSTOLIC BLOOD PRESSURE: 143 MMHG | DIASTOLIC BLOOD PRESSURE: 95 MMHG | OXYGEN SATURATION: 99 % | HEIGHT: 70 IN | TEMPERATURE: 98 F | WEIGHT: 116 LBS

## 2020-07-06 DIAGNOSIS — R55 SYNCOPE AND COLLAPSE: Primary | ICD-10-CM

## 2020-07-06 DIAGNOSIS — R35.0 URINARY FREQUENCY: ICD-10-CM

## 2020-07-06 LAB
ALBUMIN SERPL-MCNC: 3.7 G/DL (ref 3.4–5)
ALBUMIN/GLOB SERPL: 1.1 {RATIO} (ref 1–2)
ALP LIVER SERPL-CCNC: 82 U/L (ref 45–117)
ALT SERPL-CCNC: 15 U/L (ref 16–61)
ANION GAP SERPL CALC-SCNC: 9 MMOL/L (ref 0–18)
AST SERPL-CCNC: 21 U/L (ref 15–37)
ATRIAL RATE: 83 BPM
BASOPHILS # BLD AUTO: 0.01 X10(3) UL (ref 0–0.2)
BASOPHILS NFR BLD AUTO: 0.2 %
BILIRUB SERPL-MCNC: 1.6 MG/DL (ref 0.1–2)
BUN BLD-MCNC: 17 MG/DL (ref 7–18)
BUN/CREAT SERPL: 14.9 (ref 10–20)
CALCIUM BLD-MCNC: 9.2 MG/DL (ref 8.5–10.1)
CHLORIDE SERPL-SCNC: 97 MMOL/L (ref 98–112)
CO2 SERPL-SCNC: 29 MMOL/L (ref 21–32)
COLOR UR AUTO: YELLOW
CREAT BLD-MCNC: 1.14 MG/DL (ref 0.7–1.3)
DEPRECATED RDW RBC AUTO: 47.8 FL (ref 35.1–46.3)
EOSINOPHIL # BLD AUTO: 0.04 X10(3) UL (ref 0–0.7)
EOSINOPHIL NFR BLD AUTO: 0.7 %
ERYTHROCYTE [DISTWIDTH] IN BLOOD BY AUTOMATED COUNT: 14 % (ref 11–15)
GLOBULIN PLAS-MCNC: 3.5 G/DL (ref 2.8–4.4)
GLUCOSE BLD-MCNC: 124 MG/DL (ref 70–99)
GLUCOSE UR STRIP.AUTO-MCNC: NEGATIVE MG/DL
HCT VFR BLD AUTO: 41.3 % (ref 39–53)
HGB BLD-MCNC: 13.7 G/DL (ref 13–17.5)
IMM GRANULOCYTES # BLD AUTO: 0.02 X10(3) UL (ref 0–1)
IMM GRANULOCYTES NFR BLD: 0.4 %
KETONES UR STRIP.AUTO-MCNC: 15 MG/DL
LEUKOCYTE ESTERASE UR QL STRIP.AUTO: NEGATIVE
LYMPHOCYTES # BLD AUTO: 1 X10(3) UL (ref 1–4)
LYMPHOCYTES NFR BLD AUTO: 18.5 %
M PROTEIN MFR SERPL ELPH: 7.2 G/DL (ref 6.4–8.2)
MCH RBC QN AUTO: 31 PG (ref 26–34)
MCHC RBC AUTO-ENTMCNC: 33.2 G/DL (ref 31–37)
MCV RBC AUTO: 93.4 FL (ref 80–100)
MONOCYTES # BLD AUTO: 0.41 X10(3) UL (ref 0.1–1)
MONOCYTES NFR BLD AUTO: 7.6 %
NEUTROPHILS # BLD AUTO: 3.94 X10 (3) UL (ref 1.5–7.7)
NEUTROPHILS # BLD AUTO: 3.94 X10(3) UL (ref 1.5–7.7)
NEUTROPHILS NFR BLD AUTO: 72.6 %
NITRITE UR QL STRIP.AUTO: NEGATIVE
OSMOLALITY SERPL CALC.SUM OF ELEC: 283 MOSM/KG (ref 275–295)
P AXIS: 78 DEGREES
P-R INTERVAL: 134 MS
PH UR STRIP.AUTO: 7 [PH] (ref 4.5–8)
PLATELET # BLD AUTO: 151 10(3)UL (ref 150–450)
POTASSIUM SERPL-SCNC: 4.2 MMOL/L (ref 3.5–5.1)
PROT UR STRIP.AUTO-MCNC: NEGATIVE MG/DL
Q-T INTERVAL: 342 MS
QRS DURATION: 84 MS
QTC CALCULATION (BEZET): 401 MS
R AXIS: 14 DEGREES
RBC # BLD AUTO: 4.42 X10(6)UL (ref 3.8–5.8)
SODIUM SERPL-SCNC: 135 MMOL/L (ref 136–145)
SP GR UR STRIP.AUTO: 1.02 (ref 1–1.03)
T AXIS: 66 DEGREES
TROPONIN I SERPL-MCNC: <0.045 NG/ML (ref ?–0.04)
UROBILINOGEN UR STRIP.AUTO-MCNC: 0.2 MG/DL
VENTRICULAR RATE: 83 BPM
WBC # BLD AUTO: 5.4 X10(3) UL (ref 4–11)

## 2020-07-06 PROCEDURE — 70450 CT HEAD/BRAIN W/O DYE: CPT | Performed by: EMERGENCY MEDICINE

## 2020-07-06 PROCEDURE — 84484 ASSAY OF TROPONIN QUANT: CPT | Performed by: EMERGENCY MEDICINE

## 2020-07-06 PROCEDURE — 81003 URINALYSIS AUTO W/O SCOPE: CPT | Performed by: EMERGENCY MEDICINE

## 2020-07-06 PROCEDURE — 99285 EMERGENCY DEPT VISIT HI MDM: CPT

## 2020-07-06 PROCEDURE — 85025 COMPLETE CBC W/AUTO DIFF WBC: CPT | Performed by: EMERGENCY MEDICINE

## 2020-07-06 PROCEDURE — 71045 X-RAY EXAM CHEST 1 VIEW: CPT | Performed by: EMERGENCY MEDICINE

## 2020-07-06 PROCEDURE — 36415 COLL VENOUS BLD VENIPUNCTURE: CPT

## 2020-07-06 PROCEDURE — 93005 ELECTROCARDIOGRAM TRACING: CPT

## 2020-07-06 PROCEDURE — 80053 COMPREHEN METABOLIC PANEL: CPT | Performed by: EMERGENCY MEDICINE

## 2020-07-06 PROCEDURE — 93010 ELECTROCARDIOGRAM REPORT: CPT

## 2020-07-06 NOTE — TELEPHONE ENCOUNTER
Fainted in shower on Saturday,  And passed out again when he got out, wife caught him and tried to keep talking to him. She was able to get him to the bed. he stayed in bed most of day. Watched TV. Yesterday back pain, and felt weak.  Last night unable

## 2020-07-06 NOTE — ED PROVIDER NOTES
Patient Seen in: THE [facility] Emergency Department In [place]      History   Patient presents with:  Syncope    Stated Complaint: syncopal episode saturday - fell in shower c/o back pain and HA     HPI    Patient is a 17-year-old male who lives with his wif mention of hemorrhage    • Unspecified hypothyroidism    • Weight loss               Past Surgical History:   Procedure Laterality Date   • ANTERIOR CERVICAL FUSION BG & INST 3 LEVEL N/A 5/22/2014    Performed by Tiffanie Novak MD at [place] [address] LEAD N/A 12/18/2013    Performed by Fernanda Robbins MD at [address] N/A 2/3/2014    Performed by Fernanda Robbins MD at [facility]   • TONSILLECTOMY     • TRANSFORAMINAL EPIDURAL - LUM hepatosplenomegaly. EXTREMITIES: Full range of motion, no tenderness, good capillary refill. SKIN: No rash, good turgor. NEURO: Patient answers questions appropriately. No focal deficits appreciated. Conversant. Cranials 2 through 12 intact.   5 out o been stable for the past 2 days. Patient does not wish to be admitted felt comfortable going home. Patient prefer to follow-up as an outpatient. Patient should follow-up with primary physician. Patient also given cardiology and urology follow-up.   Radames

## 2020-07-06 NOTE — ED INITIAL ASSESSMENT (HPI)
Passed out in the shower in on Saturday- wife stated pt just \"slid\" down in the shower- no inj-- c/o slight dizziness and lightheaded since

## 2020-07-14 ENCOUNTER — APPOINTMENT (OUTPATIENT)
Dept: LAB | Age: 80
End: 2020-07-14
Attending: FAMILY MEDICINE
Payer: MEDICARE

## 2020-07-14 ENCOUNTER — OFFICE VISIT (OUTPATIENT)
Dept: FAMILY MEDICINE CLINIC | Facility: CLINIC | Age: 80
End: 2020-07-14
Payer: MEDICARE

## 2020-07-14 VITALS
OXYGEN SATURATION: 98 % | HEART RATE: 66 BPM | DIASTOLIC BLOOD PRESSURE: 78 MMHG | RESPIRATION RATE: 16 BRPM | BODY MASS INDEX: 17.32 KG/M2 | WEIGHT: 121 LBS | SYSTOLIC BLOOD PRESSURE: 118 MMHG | HEIGHT: 70 IN

## 2020-07-14 DIAGNOSIS — E03.9 HYPOTHYROIDISM, UNSPECIFIED TYPE: ICD-10-CM

## 2020-07-14 DIAGNOSIS — M54.16 LUMBAR RADICULOPATHY: Primary | ICD-10-CM

## 2020-07-14 DIAGNOSIS — R42 DIZZINESS: ICD-10-CM

## 2020-07-14 DIAGNOSIS — M54.12 CERVICAL RADICULOPATHY: ICD-10-CM

## 2020-07-14 DIAGNOSIS — E78.00 PURE HYPERCHOLESTEROLEMIA: ICD-10-CM

## 2020-07-14 DIAGNOSIS — F51.01 PRIMARY INSOMNIA: ICD-10-CM

## 2020-07-14 DIAGNOSIS — R73.03 PREDIABETES: ICD-10-CM

## 2020-07-14 DIAGNOSIS — F06.8 ANXIETY DISORDER DUE TO MULTIPLE MEDICAL PROBLEMS: ICD-10-CM

## 2020-07-14 LAB
EST. AVERAGE GLUCOSE BLD GHB EST-MCNC: 114 MG/DL (ref 68–126)
HBA1C MFR BLD HPLC: 5.6 % (ref ?–5.7)
T4 FREE SERPL-MCNC: 1.5 NG/DL (ref 0.8–1.7)
TSI SER-ACNC: 1.85 MIU/ML (ref 0.36–3.74)

## 2020-07-14 PROCEDURE — 99214 OFFICE O/P EST MOD 30 MIN: CPT | Performed by: FAMILY MEDICINE

## 2020-07-14 PROCEDURE — 36415 COLL VENOUS BLD VENIPUNCTURE: CPT

## 2020-07-14 PROCEDURE — 84443 ASSAY THYROID STIM HORMONE: CPT

## 2020-07-14 PROCEDURE — 84439 ASSAY OF FREE THYROXINE: CPT

## 2020-07-14 PROCEDURE — 83036 HEMOGLOBIN GLYCOSYLATED A1C: CPT

## 2020-07-14 RX ORDER — GABAPENTIN 100 MG/1
100 CAPSULE ORAL 3 TIMES DAILY
Qty: 270 CAPSULE | Refills: 1 | Status: SHIPPED | OUTPATIENT
Start: 2020-07-14 | End: 2020-07-16

## 2020-07-14 RX ORDER — MECLIZINE HYDROCHLORIDE 25 MG/1
25 TABLET ORAL 3 TIMES DAILY PRN
Qty: 30 TABLET | Refills: 0 | Status: SHIPPED | OUTPATIENT
Start: 2020-07-14 | End: 2021-08-10 | Stop reason: ALTCHOICE

## 2020-07-14 RX ORDER — PREDNISONE 20 MG/1
TABLET ORAL
Qty: 13 TABLET | Refills: 0 | Status: SHIPPED | OUTPATIENT
Start: 2020-07-14 | End: 2021-06-25 | Stop reason: ALTCHOICE

## 2020-07-14 RX ORDER — CLONAZEPAM 2 MG/1
2 TABLET ORAL NIGHTLY
Qty: 30 TABLET | Refills: 2 | Status: SHIPPED | OUTPATIENT
Start: 2020-07-14 | End: 2020-10-12

## 2020-07-14 RX ORDER — ATORVASTATIN CALCIUM 10 MG/1
TABLET, FILM COATED ORAL
Qty: 90 TABLET | Refills: 3 | Status: SHIPPED | OUTPATIENT
Start: 2020-07-14 | End: 2021-06-30

## 2020-07-14 NOTE — PROGRESS NOTES
HPI:    Patient ID: Alla Clifford is a 78year old male. Pt is prediabetic. This is a chronic problem. This problem onset over 1 year ago. Pts last a1c on 7/3/2019 was 5.8- this has been an improvement from previous a1cs.  He is due for repeat testing lipid tests were reviewed and are high. Exacerbating diseases include hypothyroidism. He has no history of obesity. Factors aggravating his hyperlipidemia include fatty foods.  Pertinent negatives include no chest pain, focal sensory loss, myalgias or short mouth 3 (three) times daily as needed.  30 tablet 0   • Pantoprazole Sodium 40 MG Oral Tab EC Take one tablet (40 mg total) by mouth once daily, 30 minutes prior to breakfast. 90 tablet 1   • fentaNYL 12 MCG/HR Transdermal Patch 72 Hr Place 1 patch onto the hypercholesterolemia  Prediabetes  Cervical radiculopathy  Hypothyroidism, unspecified type  Dizziness    1. Primary insomnia  Controlled, CPM  - clonazePAM 2 MG Oral Tab; Take 1 tablet (2 mg total) by mouth nightly. Dispense: 30 tablet; Refill: 2    2.  A Future    8. Dizziness  Due for repeat blood work at this time. Follow up based on results. Meclizine is given to help aid dizziness.  - TSH+FREE T4; Future  - Meclizine HCl 25 MG Oral Tab;  Take 1 tablet (25 mg total) by mouth 3 (three) times daily as need

## 2020-07-14 NOTE — PATIENT INSTRUCTIONS
meclazine is for vertigo/dizzyness, just to try. Gabapentin 100mg 3 times a day x 1 week, then 200mg 3 times a day x 1 week, then 300mg 3 times a day for nerve pain and tingling. Prednisone with food earlier in the day. Get up slowly.     Blood gertrude

## 2020-10-11 ENCOUNTER — PATIENT MESSAGE (OUTPATIENT)
Dept: FAMILY MEDICINE CLINIC | Facility: CLINIC | Age: 80
End: 2020-10-11

## 2020-10-11 DIAGNOSIS — F51.01 PRIMARY INSOMNIA: ICD-10-CM

## 2020-10-11 DIAGNOSIS — F06.8 ANXIETY DISORDER DUE TO MULTIPLE MEDICAL PROBLEMS: ICD-10-CM

## 2020-10-12 RX ORDER — CLONAZEPAM 2 MG/1
2 TABLET ORAL NIGHTLY
Qty: 30 TABLET | Refills: 2 | Status: SHIPPED | OUTPATIENT
Start: 2020-10-12 | End: 2021-01-07

## 2020-10-12 NOTE — TELEPHONE ENCOUNTER
Approve/deny Clonazepam  Last filled 7/2020  Last 0v 4/2019  Pt did see Dr Jennifer Finn 7/2020 Pt also goes to pain clinic.

## 2020-10-12 NOTE — TELEPHONE ENCOUNTER
From: Emma Zuleta  To: Sonja Javed MD  Sent: 10/11/2020 2:16 PM CDT  Subject: Prescription Question    Dr. Pamela Holley,  Could I get a prescription refill for Clonazapam (2mg) with refills to help me sleep?  I had blood tests in the ER on July 6th an

## 2020-10-21 DIAGNOSIS — E03.9 HYPOTHYROIDISM, UNSPECIFIED TYPE: Chronic | ICD-10-CM

## 2020-10-21 RX ORDER — LEVOTHYROXINE SODIUM 88 UG/1
TABLET ORAL
Qty: 90 TABLET | Refills: 1 | Status: SHIPPED | OUTPATIENT
Start: 2020-10-21 | End: 2021-04-19

## 2020-12-02 ENCOUNTER — TELEMEDICINE (OUTPATIENT)
Dept: TELEHEALTH | Age: 80
End: 2020-12-02

## 2020-12-02 DIAGNOSIS — H04.302 INFECTION OF LEFT TEAR DUCT: Primary | ICD-10-CM

## 2020-12-02 PROCEDURE — 99202 OFFICE O/P NEW SF 15 MIN: CPT | Performed by: PHYSICIAN ASSISTANT

## 2020-12-02 RX ORDER — OFLOXACIN 3 MG/ML
1 SOLUTION/ DROPS OPHTHALMIC 4 TIMES DAILY
Qty: 10 ML | Refills: 0 | Status: SHIPPED | OUTPATIENT
Start: 2020-12-02 | End: 2020-12-09

## 2020-12-02 NOTE — PROGRESS NOTES
CHIEF COMPLAINT:     Patient presents with:  Eye Problem: states blocked tear duct on the left, has had this before, feels similar, otherwise feeling well, no other symptoms at this time, ofloxacin has helped in the past      HPI:   Sabra Cruz is a 3 mL 9   • Melatonin 10 MG Oral Cap Take 15 mg by mouth.        • Insulin Syringe-Needle U-100 (BD INSULIN SYRINGE ULTRAFINE) 31G X 5/16\" 1 ML Does not apply Misc Use for B12 injection 10 each 2   • Vitamin D3 (VITAMIN D3) 2000 UNITS Oral Cap Take 1 capsul 8/14/2018    Performed by Beba Mina MD at [facility] ENDOSCOPY   • ESOPHAGOGASTRODUODENOSCOPY (EGD) N/A 6/14/2016    Performed by Beba Mina MD at [address] [place] [address] ENDOSCOPY   • HERNIA SURGERY  2014   • Vy Mckeon N/A 5/22/2014    Performed • Heart Disorder Father    • Hypertension Mother    • Heart Disorder Mother       Social History    Tobacco Use      Smoking status: Never Smoker      Smokeless tobacco: Never Used    Alcohol use: No    Drug use: No        REVIEW OF SYSTEMS:   GENERAL:

## 2020-12-02 NOTE — PATIENT INSTRUCTIONS
Infected Tear Sac (Dacryocystitis)  Dacryocystitis is an infection of the tear sac. The tear sac is the narrow pouch where tears from the eye drain before they flow into the nose. You have 1 tear sac for each eye. Tears moisten and clean the eyes.  They · After the warm compress, massage the tear sac with clean hands. Place your index finger between the corner of the eye and the nose. Your finger should be pointing toward the top of the nose. Gently massage from the nose toward the eye.  A small amount of © 7346-7261 [employer] [address]. [address] [facility] [place]. All rights reserved. This information is not intended as a substitute for professional medical care. Always follow your healthcare professional's instructions.

## 2020-12-19 DIAGNOSIS — D51.0 PERNICIOUS ANEMIA: ICD-10-CM

## 2020-12-21 RX ORDER — CYANOCOBALAMIN 1000 UG/ML
INJECTION INTRAMUSCULAR; SUBCUTANEOUS
Qty: 3 ML | Refills: 9 | Status: SHIPPED | OUTPATIENT
Start: 2020-12-21 | End: 2021-04-06

## 2021-01-07 ENCOUNTER — PATIENT MESSAGE (OUTPATIENT)
Dept: FAMILY MEDICINE CLINIC | Facility: CLINIC | Age: 81
End: 2021-01-07

## 2021-01-07 DIAGNOSIS — F51.01 PRIMARY INSOMNIA: ICD-10-CM

## 2021-01-07 DIAGNOSIS — F06.8 ANXIETY DISORDER DUE TO MULTIPLE MEDICAL PROBLEMS: ICD-10-CM

## 2021-01-07 DIAGNOSIS — E53.8 VITAMIN B12 DEFICIENCY: ICD-10-CM

## 2021-01-07 RX ORDER — CLONAZEPAM 2 MG/1
2 TABLET ORAL NIGHTLY
Qty: 30 TABLET | Refills: 2 | Status: SHIPPED | OUTPATIENT
Start: 2021-01-07 | End: 2021-04-06

## 2021-01-07 RX ORDER — BLOOD SUGAR DIAGNOSTIC
STRIP MISCELLANEOUS
Qty: 10 EACH | Refills: 2 | Status: SHIPPED | OUTPATIENT
Start: 2021-01-07

## 2021-01-07 NOTE — TELEPHONE ENCOUNTER
Refill request for clonazepam:  Last refill :     clonazePAM 2 MG Oral Tab 30 tablet 2 10/12/2020    Sig:   Take 1 tablet (2 mg total) by mouth nightly.      Route:   Oral       Last OV: 7/14/2020  No future appts  Approve/deny:

## 2021-01-07 NOTE — TELEPHONE ENCOUNTER
----- Message from Zamzam Chaney sent at 10/19/2017  2:26 PM EDT -----  Contact: MALAIKA  SUGAR READING    10/5  235   8AM  10/6  298   9AM  10/7  234   9:30AM  10/8  197   10 AM  10/8   315   6 PM  10/9   243   5:30 PM  10/10  264    11:30AM  10/10  300    4 PM  10/11  281     7AM  10/11   229    10AM  10/12   190    11:30AM  10/12   288     6PM  10/13    254    1:30 PM  10/14    312     10:30AM  10/14     187     7PM  10/15     232     2PM  10/15     314     9PM  10/16     297     12:30PM  10/16     228      11:30PM  10/17     273      9:30AM  10/17     232       2PM  10/18     306       7AM  10/18      198      3PM  10/19      270       1PM     From: Pooja Zuleta  To: Anh Avila MD  Sent: 1/7/2021 9:07 AM CST  Subject: Prescription Question    Dr. Shlomo Kyle,  I request a prescription for clonazepam (2mg) with refills to help me sleep. I also need syringes for my B12 injections.   U-100

## 2021-04-06 ENCOUNTER — PATIENT MESSAGE (OUTPATIENT)
Dept: FAMILY MEDICINE CLINIC | Facility: CLINIC | Age: 81
End: 2021-04-06

## 2021-04-06 DIAGNOSIS — F51.01 PRIMARY INSOMNIA: ICD-10-CM

## 2021-04-06 DIAGNOSIS — F06.8 ANXIETY DISORDER DUE TO MULTIPLE MEDICAL PROBLEMS: ICD-10-CM

## 2021-04-06 DIAGNOSIS — D51.0 PERNICIOUS ANEMIA: ICD-10-CM

## 2021-04-06 RX ORDER — CLONAZEPAM 2 MG/1
2 TABLET ORAL NIGHTLY
Qty: 30 TABLET | Refills: 2 | Status: SHIPPED | OUTPATIENT
Start: 2021-04-06 | End: 2021-07-06

## 2021-04-06 RX ORDER — CYANOCOBALAMIN 1000 UG/ML
1000 INJECTION INTRAMUSCULAR; SUBCUTANEOUS
Qty: 3 ML | Refills: 9 | Status: SHIPPED | OUTPATIENT
Start: 2021-04-06

## 2021-04-06 NOTE — TELEPHONE ENCOUNTER
From: Sylvie Zuleta  To: Damion Baltazar MD  Sent: 4/6/2021 9:45 AM CDT  Subject: Prescription Question    Dr. Rich Wakefield,  Could I get a prescription for Clonazepam, 30 tablets, 2MG with refills to help me sleep?     I also need a prescription for Cyanoc

## 2021-04-18 DIAGNOSIS — E03.9 HYPOTHYROIDISM, UNSPECIFIED TYPE: Chronic | ICD-10-CM

## 2021-04-19 RX ORDER — LEVOTHYROXINE SODIUM 88 UG/1
TABLET ORAL
Qty: 90 TABLET | Refills: 1 | Status: SHIPPED | OUTPATIENT
Start: 2021-04-19 | End: 2021-10-18

## 2021-06-25 ENCOUNTER — APPOINTMENT (OUTPATIENT)
Dept: GENERAL RADIOLOGY | Facility: HOSPITAL | Age: 81
End: 2021-06-25
Attending: EMERGENCY MEDICINE
Payer: MEDICARE

## 2021-06-25 ENCOUNTER — HOSPITAL ENCOUNTER (EMERGENCY)
Facility: HOSPITAL | Age: 81
Discharge: HOME | End: 2021-06-25
Attending: EMERGENCY MEDICINE
Payer: MEDICARE

## 2021-06-25 VITALS
BODY MASS INDEX: 17.9 KG/M2 | OXYGEN SATURATION: 95 % | TEMPERATURE: 98 F | WEIGHT: 125 LBS | RESPIRATION RATE: 17 BRPM | SYSTOLIC BLOOD PRESSURE: 144 MMHG | DIASTOLIC BLOOD PRESSURE: 77 MMHG | HEIGHT: 70 IN | HEART RATE: 75 BPM

## 2021-06-25 DIAGNOSIS — K59.00 CONSTIPATION, UNSPECIFIED CONSTIPATION TYPE: Primary | ICD-10-CM

## 2021-06-25 PROCEDURE — 83690 ASSAY OF LIPASE: CPT | Performed by: EMERGENCY MEDICINE

## 2021-06-25 PROCEDURE — 85025 COMPLETE CBC W/AUTO DIFF WBC: CPT | Performed by: EMERGENCY MEDICINE

## 2021-06-25 PROCEDURE — 99283 EMERGENCY DEPT VISIT LOW MDM: CPT

## 2021-06-25 PROCEDURE — 93005 ELECTROCARDIOGRAM TRACING: CPT

## 2021-06-25 PROCEDURE — 74019 RADEX ABDOMEN 2 VIEWS: CPT | Performed by: EMERGENCY MEDICINE

## 2021-06-25 PROCEDURE — 93010 ELECTROCARDIOGRAM REPORT: CPT

## 2021-06-25 PROCEDURE — 80053 COMPREHEN METABOLIC PANEL: CPT | Performed by: EMERGENCY MEDICINE

## 2021-06-25 PROCEDURE — 99284 EMERGENCY DEPT VISIT MOD MDM: CPT

## 2021-06-25 PROCEDURE — 36415 COLL VENOUS BLD VENIPUNCTURE: CPT

## 2021-06-25 RX ORDER — POLYETHYLENE GLYCOL 3350 17 G/17G
17 POWDER, FOR SOLUTION ORAL DAILY
COMMUNITY

## 2021-06-25 RX ORDER — FENTANYL 37.5 UG/H
1 PATCH, EXTENDED RELEASE TRANSDERMAL
Status: ON HOLD | COMMUNITY
End: 2021-07-28

## 2021-06-25 RX ORDER — ACETAMINOPHEN 500 MG
500 TABLET ORAL EVERY 6 HOURS PRN
COMMUNITY

## 2021-06-25 RX ORDER — OFLOXACIN 3 MG/ML
2 SOLUTION/ DROPS OPHTHALMIC 2 TIMES DAILY PRN
COMMUNITY

## 2021-06-25 RX ORDER — MAG HYDROX/ALUMINUM HYD/SIMETH 400-400-40
1 SUSPENSION, ORAL (FINAL DOSE FORM) ORAL ONCE
Qty: 1 SUPPOSITORY | Refills: 0 | Status: SHIPPED | OUTPATIENT
Start: 2021-06-25 | End: 2021-06-25

## 2021-06-26 NOTE — ED PROVIDER NOTES
Patient Seen in: [facility] Emergency Department      History   Patient presents with:  Abdomen/Flank Pain    Stated Complaint:     HPI/Subjective:   HPI    78 yr old M here with generalized abdominal discomfort, reports he is constipated and has no Neurological:      General: No focal deficit present. Mental Status: He is alert and oriented to person, place, and time. Mental status is at baseline.       Comments: moves all extremities against gravity, face symmetric, speech clear    Psychiatric type  (primary encounter diagnosis)     Disposition:  Discharge  6/25/2021  4:00 pm    Follow-up:  Jane Humphrey MD   [address] [place] [address]  [phone]    In 2 days            Medications Prescribed:  Discharge Medication List as of

## 2021-06-29 DIAGNOSIS — E78.00 PURE HYPERCHOLESTEROLEMIA: ICD-10-CM

## 2021-06-30 RX ORDER — ATORVASTATIN CALCIUM 10 MG/1
TABLET, FILM COATED ORAL
Qty: 90 TABLET | Refills: 3 | Status: SHIPPED | OUTPATIENT
Start: 2021-06-30

## 2021-07-26 ENCOUNTER — APPOINTMENT (OUTPATIENT)
Dept: CT IMAGING | Facility: HOSPITAL | Age: 81
DRG: 194 | End: 2021-07-26
Attending: EMERGENCY MEDICINE
Payer: MEDICARE

## 2021-07-26 ENCOUNTER — HOSPITAL ENCOUNTER (INPATIENT)
Facility: HOSPITAL | Age: 81
LOS: 3 days | Discharge: NURSING, REHAB OR LONG-TERM CARE | DRG: 194 | End: 2021-07-29
Attending: EMERGENCY MEDICINE | Admitting: INTERNAL MEDICINE
Payer: MEDICARE

## 2021-07-26 ENCOUNTER — APPOINTMENT (OUTPATIENT)
Dept: GENERAL RADIOLOGY | Facility: HOSPITAL | Age: 81
DRG: 194 | End: 2021-07-26
Attending: EMERGENCY MEDICINE
Payer: MEDICARE

## 2021-07-26 DIAGNOSIS — F51.01 PRIMARY INSOMNIA: ICD-10-CM

## 2021-07-26 DIAGNOSIS — Z98.1 S/P LUMBAR FUSION: ICD-10-CM

## 2021-07-26 DIAGNOSIS — R55 SYNCOPE, UNSPECIFIED SYNCOPE TYPE: Primary | ICD-10-CM

## 2021-07-26 DIAGNOSIS — R53.1 WEAKNESS GENERALIZED: ICD-10-CM

## 2021-07-26 DIAGNOSIS — F11.90 OPIATE USE: ICD-10-CM

## 2021-07-26 DIAGNOSIS — E87.1 HYPONATREMIA: ICD-10-CM

## 2021-07-26 DIAGNOSIS — F06.8 ANXIETY DISORDER DUE TO MULTIPLE MEDICAL PROBLEMS: ICD-10-CM

## 2021-07-26 DIAGNOSIS — G89.4 CHRONIC PAIN SYNDROME: ICD-10-CM

## 2021-07-26 LAB
ALBUMIN SERPL-MCNC: 2.7 G/DL (ref 3.4–5)
ALBUMIN/GLOB SERPL: 0.7 {RATIO} (ref 1–2)
ALP LIVER SERPL-CCNC: 103 U/L
ALT SERPL-CCNC: 21 U/L
ANION GAP SERPL CALC-SCNC: 4 MMOL/L (ref 0–18)
AST SERPL-CCNC: 28 U/L (ref 15–37)
BASOPHILS # BLD AUTO: 0.02 X10(3) UL (ref 0–0.2)
BASOPHILS NFR BLD AUTO: 0.2 %
BILIRUB SERPL-MCNC: 1.4 MG/DL (ref 0.1–2)
BUN BLD-MCNC: 21 MG/DL (ref 7–18)
BUN/CREAT SERPL: 21.4 (ref 10–20)
CALCIUM BLD-MCNC: 8.2 MG/DL (ref 8.5–10.1)
CHLORIDE SERPL-SCNC: 96 MMOL/L (ref 98–112)
CO2 SERPL-SCNC: 29 MMOL/L (ref 21–32)
CREAT BLD-MCNC: 0.98 MG/DL
DEPRECATED RDW RBC AUTO: 53.7 FL (ref 35.1–46.3)
EOSINOPHIL # BLD AUTO: 0 X10(3) UL (ref 0–0.7)
EOSINOPHIL NFR BLD AUTO: 0 %
ERYTHROCYTE [DISTWIDTH] IN BLOOD BY AUTOMATED COUNT: 16.5 % (ref 11–15)
GLOBULIN PLAS-MCNC: 4.1 G/DL (ref 2.8–4.4)
GLUCOSE BLD-MCNC: 137 MG/DL (ref 70–99)
HCT VFR BLD AUTO: 36.4 %
HGB BLD-MCNC: 12.4 G/DL
IMM GRANULOCYTES # BLD AUTO: 0.07 X10(3) UL (ref 0–1)
IMM GRANULOCYTES NFR BLD: 0.6 %
LYMPHOCYTES # BLD AUTO: 0.51 X10(3) UL (ref 1–4)
LYMPHOCYTES NFR BLD AUTO: 4.5 %
M PROTEIN MFR SERPL ELPH: 6.8 G/DL (ref 6.4–8.2)
MCH RBC QN AUTO: 30.2 PG (ref 26–34)
MCHC RBC AUTO-ENTMCNC: 34.1 G/DL (ref 31–37)
MCV RBC AUTO: 88.6 FL
MONOCYTES # BLD AUTO: 0.78 X10(3) UL (ref 0.1–1)
MONOCYTES NFR BLD AUTO: 6.8 %
NEUTROPHILS # BLD AUTO: 10.07 X10 (3) UL (ref 1.5–7.7)
NEUTROPHILS # BLD AUTO: 10.07 X10(3) UL (ref 1.5–7.7)
NEUTROPHILS NFR BLD AUTO: 87.9 %
NT-PROBNP SERPL-MCNC: 1231 PG/ML (ref ?–450)
OSMOLALITY SERPL CALC.SUM OF ELEC: 273 MOSM/KG (ref 275–295)
PLATELET # BLD AUTO: 197 10(3)UL (ref 150–450)
POTASSIUM SERPL-SCNC: 4.6 MMOL/L (ref 3.5–5.1)
PROCALCITONIN SERPL-MCNC: 0.62 NG/ML (ref ?–0.16)
RBC # BLD AUTO: 4.11 X10(6)UL
SARS-COV-2 RNA RESP QL NAA+PROBE: NOT DETECTED
SODIUM SERPL-SCNC: 129 MMOL/L (ref 136–145)
TROPONIN I SERPL-MCNC: <0.045 NG/ML (ref ?–0.04)
WBC # BLD AUTO: 11.5 X10(3) UL (ref 4–11)

## 2021-07-26 PROCEDURE — 99222 1ST HOSP IP/OBS MODERATE 55: CPT | Performed by: STUDENT IN AN ORGANIZED HEALTH CARE EDUCATION/TRAINING PROGRAM

## 2021-07-26 PROCEDURE — 72125 CT NECK SPINE W/O DYE: CPT | Performed by: EMERGENCY MEDICINE

## 2021-07-26 PROCEDURE — 70450 CT HEAD/BRAIN W/O DYE: CPT | Performed by: EMERGENCY MEDICINE

## 2021-07-26 PROCEDURE — 71045 X-RAY EXAM CHEST 1 VIEW: CPT | Performed by: EMERGENCY MEDICINE

## 2021-07-26 RX ORDER — ACETAMINOPHEN 325 MG/1
650 TABLET ORAL EVERY 6 HOURS PRN
Status: DISCONTINUED | OUTPATIENT
Start: 2021-07-26 | End: 2021-07-29

## 2021-07-26 RX ORDER — HEPARIN SODIUM 5000 [USP'U]/ML
5000 INJECTION, SOLUTION INTRAVENOUS; SUBCUTANEOUS EVERY 8 HOURS SCHEDULED
Status: DISCONTINUED | OUTPATIENT
Start: 2021-07-26 | End: 2021-07-29

## 2021-07-26 RX ORDER — METOCLOPRAMIDE HYDROCHLORIDE 5 MG/ML
10 INJECTION INTRAMUSCULAR; INTRAVENOUS EVERY 8 HOURS PRN
Status: DISCONTINUED | OUTPATIENT
Start: 2021-07-26 | End: 2021-07-29

## 2021-07-26 RX ORDER — BISACODYL 10 MG
10 SUPPOSITORY, RECTAL RECTAL
Status: DISCONTINUED | OUTPATIENT
Start: 2021-07-26 | End: 2021-07-29

## 2021-07-26 RX ORDER — POLYETHYLENE GLYCOL 3350 17 G/17G
17 POWDER, FOR SOLUTION ORAL DAILY PRN
Status: DISCONTINUED | OUTPATIENT
Start: 2021-07-26 | End: 2021-07-29

## 2021-07-26 RX ORDER — SODIUM PHOSPHATE, DIBASIC AND SODIUM PHOSPHATE, MONOBASIC 7; 19 G/133ML; G/133ML
1 ENEMA RECTAL ONCE AS NEEDED
Status: DISCONTINUED | OUTPATIENT
Start: 2021-07-26 | End: 2021-07-29

## 2021-07-26 RX ORDER — ONDANSETRON 2 MG/ML
4 INJECTION INTRAMUSCULAR; INTRAVENOUS EVERY 6 HOURS PRN
Status: DISCONTINUED | OUTPATIENT
Start: 2021-07-26 | End: 2021-07-29

## 2021-07-26 NOTE — ED INITIAL ASSESSMENT (HPI)
Pt to the emergency room for fall this afternoon unwitnessed. No uncontrolled bleeding noted. No deformities noted. Pt states that prior to the fall he has been feeling increasingly weak. Pt febrile per EMS with O2 sat of 88% on RA.  Pt was promptly placed

## 2021-07-27 ENCOUNTER — APPOINTMENT (OUTPATIENT)
Dept: GENERAL RADIOLOGY | Facility: HOSPITAL | Age: 81
DRG: 194 | End: 2021-07-27
Attending: HOSPITALIST
Payer: MEDICARE

## 2021-07-27 LAB
ANION GAP SERPL CALC-SCNC: 4 MMOL/L (ref 0–18)
ATRIAL RATE: 109 BPM
BASOPHILS # BLD AUTO: 0.03 X10(3) UL (ref 0–0.2)
BASOPHILS NFR BLD AUTO: 0.3 %
BILIRUB UR QL STRIP.AUTO: NEGATIVE
BUN BLD-MCNC: 17 MG/DL (ref 7–18)
BUN/CREAT SERPL: 20.5 (ref 10–20)
CALCIUM BLD-MCNC: 8.4 MG/DL (ref 8.5–10.1)
CHLORIDE SERPL-SCNC: 97 MMOL/L (ref 98–112)
CLARITY UR REFRACT.AUTO: CLEAR
CO2 SERPL-SCNC: 31 MMOL/L (ref 21–32)
COLOR UR AUTO: YELLOW
CREAT BLD-MCNC: 0.83 MG/DL
DEPRECATED RDW RBC AUTO: 54.9 FL (ref 35.1–46.3)
EOSINOPHIL # BLD AUTO: 0.02 X10(3) UL (ref 0–0.7)
EOSINOPHIL NFR BLD AUTO: 0.2 %
ERYTHROCYTE [DISTWIDTH] IN BLOOD BY AUTOMATED COUNT: 16.4 % (ref 11–15)
GLUCOSE BLD-MCNC: 105 MG/DL (ref 70–99)
GLUCOSE UR STRIP.AUTO-MCNC: NEGATIVE MG/DL
HCT VFR BLD AUTO: 33.8 %
HGB BLD-MCNC: 11.1 G/DL
IMM GRANULOCYTES # BLD AUTO: 0.04 X10(3) UL (ref 0–1)
IMM GRANULOCYTES NFR BLD: 0.4 %
KETONES UR STRIP.AUTO-MCNC: NEGATIVE MG/DL
LEUKOCYTE ESTERASE UR QL STRIP.AUTO: NEGATIVE
LYMPHOCYTES # BLD AUTO: 1.07 X10(3) UL (ref 1–4)
LYMPHOCYTES NFR BLD AUTO: 9.7 %
MCH RBC QN AUTO: 29.8 PG (ref 26–34)
MCHC RBC AUTO-ENTMCNC: 32.8 G/DL (ref 31–37)
MCV RBC AUTO: 90.9 FL
MONOCYTES # BLD AUTO: 1.02 X10(3) UL (ref 0.1–1)
MONOCYTES NFR BLD AUTO: 9.2 %
NEUTROPHILS # BLD AUTO: 8.86 X10 (3) UL (ref 1.5–7.7)
NEUTROPHILS # BLD AUTO: 8.86 X10(3) UL (ref 1.5–7.7)
NEUTROPHILS NFR BLD AUTO: 80.2 %
NITRITE UR QL STRIP.AUTO: NEGATIVE
OSMOLALITY SERPL CALC.SUM OF ELEC: 276 MOSM/KG (ref 275–295)
P AXIS: 75 DEGREES
P-R INTERVAL: 138 MS
PH UR STRIP.AUTO: 6 [PH] (ref 5–8)
PLATELET # BLD AUTO: 185 10(3)UL (ref 150–450)
POTASSIUM SERPL-SCNC: 4.7 MMOL/L (ref 3.5–5.1)
PROT UR STRIP.AUTO-MCNC: NEGATIVE MG/DL
Q-T INTERVAL: 306 MS
QRS DURATION: 86 MS
QTC CALCULATION (BEZET): 412 MS
R AXIS: 47 DEGREES
RBC # BLD AUTO: 3.72 X10(6)UL
RBC UR QL AUTO: NEGATIVE
SODIUM SERPL-SCNC: 132 MMOL/L (ref 136–145)
SP GR UR STRIP.AUTO: 1.01 (ref 1–1.03)
T AXIS: 75 DEGREES
UROBILINOGEN UR STRIP.AUTO-MCNC: <2 MG/DL
VENTRICULAR RATE: 109 BPM
WBC # BLD AUTO: 11 X10(3) UL (ref 4–11)

## 2021-07-27 PROCEDURE — 99232 SBSQ HOSP IP/OBS MODERATE 35: CPT | Performed by: HOSPITALIST

## 2021-07-27 PROCEDURE — 74230 X-RAY XM SWLNG FUNCJ C+: CPT | Performed by: HOSPITALIST

## 2021-07-27 RX ORDER — LEVOTHYROXINE SODIUM 88 UG/1
88 TABLET ORAL
Status: DISCONTINUED | OUTPATIENT
Start: 2021-07-27 | End: 2021-07-29

## 2021-07-27 RX ORDER — PANTOPRAZOLE SODIUM 40 MG/1
40 TABLET, DELAYED RELEASE ORAL
Status: DISCONTINUED | OUTPATIENT
Start: 2021-07-27 | End: 2021-07-29

## 2021-07-27 RX ORDER — FENTANYL 25 UG/H
1 PATCH TRANSDERMAL
Status: DISCONTINUED | OUTPATIENT
Start: 2021-07-28 | End: 2021-07-29

## 2021-07-27 RX ORDER — FENTANYL 12 UG/H
1 PATCH TRANSDERMAL
Status: DISCONTINUED | OUTPATIENT
Start: 2021-07-27 | End: 2021-07-29

## 2021-07-27 RX ORDER — CLONAZEPAM 1 MG/1
2 TABLET ORAL NIGHTLY
Status: DISCONTINUED | OUTPATIENT
Start: 2021-07-27 | End: 2021-07-29

## 2021-07-27 RX ORDER — PREGABALIN 50 MG/1
50 CAPSULE ORAL DAILY
Status: DISCONTINUED | OUTPATIENT
Start: 2021-07-27 | End: 2021-07-29

## 2021-07-27 RX ORDER — ATORVASTATIN CALCIUM 10 MG/1
10 TABLET, FILM COATED ORAL NIGHTLY
Status: DISCONTINUED | OUTPATIENT
Start: 2021-07-27 | End: 2021-07-29

## 2021-07-27 NOTE — PLAN OF CARE
Pt rec'd awake and alert. Pt denies any distress or discomfort at this time. O2 sat at 95%. Telemtry moniotr showing SR. PT worked with pt recommend HHPT on discharge. Speech evaluated swllow; diet down graded to pureed with thin liquids.   Meds with mick Incentive Spirometry  - Assess the need for suctioning and perform as needed  - Assess and instruct to report SOB or any respiratory difficulty  - Respiratory Therapy support as indicated  - Manage/alleviate anxiety  - Monitor for signs/symptoms of CO2 ret

## 2021-07-27 NOTE — PROGRESS NOTES
07/26/21 2306 07/26/21 2308 07/26/21 2309   Vital Signs   Pulse 89 99 108   Heart Rate Source Monitor  --   --    Cardiac Rhythm NSR  --   --    Resp 16  --   --    Respiratory Quality Normal  --   --    /88 140/77 123/84   MAP (mmHg) 99 92 93   B

## 2021-07-27 NOTE — SLP NOTE
ADULT SWALLOWING EVALUATION    ASSESSMENT    ASSESSMENT/OVERALL IMPRESSION:  Order received per RN dysphagia screen due to hx altered diet consistency. Pt admitted s/p fall at home. Pt with hx anemia, constipation, GERD, dysphagia, ACDF, HTN.  Pt reported h time;Whole in puree  Treatment Plan/Recommendations: Videofluoroscopic swallow study       HISTORY   MEDICAL HISTORY  Reason for Referral: Altered diet consistency    Problem List  Principal Problem:    Syncope, unspecified syncope type  Active Problems: approximately 2.5 years ago for levels C5-C7. He denied any swallowing difficulties after surgery. In November of 2015 he sustained a fall and broke his jaw requiring surgery.   He reports swallowing difficulties worsening after resuming po while recoveri Videofluoroscopic Swallow Study is required to rule-out silent aspiration.)    Esophageal Phase of Swallow: Complaints consistent with possible esophageal involvement              GOALS  Goal #1 The patient/family/caregiver will demonstrate understanding a

## 2021-07-27 NOTE — HOME CARE LIAISON
Received referral from [address]. Met with patient at the bedside to discuss Residential home health and provide choice. Patient provided with list of [facility] providers from [facility], patient choice is [facility]. Financial interest disclosure provided to patient.  Resid

## 2021-07-27 NOTE — PLAN OF CARE
Pt is A&Ox3, irritable. Wife at bedside helping with hx and med rec. RA saturating 93%. Nonproductive cough. IS = 1500. PNA protocol. NSR on tele HR 90s, denies cardiovascular symptoms. Incontinent B&B at times, commode at bedside.  Bladder scan 685, voided saturation or ABGs  - Provide Smoking Cessation handout, if applicable  - Encourage broncho-pulmonary hygiene including cough, deep breathe, Incentive Spirometry  - Assess the need for suctioning and perform as needed  - Assess and instruct to report SOB o

## 2021-07-27 NOTE — SLP NOTE
ADULT VIDEOFLUOROSCOPIC SWALLOWING STUDY    Admission Date: 7/26/2021  Evaluation Date: 07/27/21  Radiologist: Dr. Prashanth Zarate   Diet Recommendations - Solids: Puree  Diet Recommendations - Liquids:  Thin Liquids    Further Follow-up:  Follow Up Unspecified hypothyroidism    • Weight loss        Current Diet Consistency: Mechanical soft ground/ Minced & Moist;Thin liquids  Prior Level of Function: Assistance/Support for ADL's  Prior Living Situation: Home with spouse  History of Recent: Pneumonia Penetration Aspiration Scale Score: Score 2: Material enters the airway, remains above the vocal folds, and is ejected from the airway       Overall Impression:   Patient seen for a Videofluoroscopic Swallow Study (VFSS) after reported \"narrow esophagus strategies such as effortful swallow, dry swallow, alternating bites and sips, small bites and sips, and lingual exercises. EDUCATION/INSTRUCTION  Reviewed results and recommendations with patient.   Agreement/Understanding verbalized and all question

## 2021-07-27 NOTE — PHYSICAL THERAPY NOTE
PHYSICAL THERAPY EVALUATION - INPATIENT     Room Number: 2625/2625-A  Evaluation Date: 7/27/2021  Type of Evaluation: Initial  Physician Order: PT Eval and Treat    Presenting Problem: syncope, pna  Reason for Therapy: Mobility Dysfunction and Discha • Problems with swallowing    • Sleep disturbance    • Stool incontinence    • Unspecified disorder of thyroid    • Unspecified essential hypertension    • Unspecified gastritis and gastroduodenitis without mention of hemorrhage    • Unspecified hypothyr EPIDURAL/SUBARACHNOID; LUMBAR/SACRAL N/A 1/15/2015    Procedure: INTRATHECAL PUMP TRIAL;  Surgeon: Vane Argueta MD;  Location: [facility] FOR PAIN MANAGEMENT   • LASER SURGERY OF EYE  6/09    cataract both eyes 2 weeks apart   • M-SEDASUNNI BY KENIA PHYS PERFR FOLLOWING EVENTS N/A 1/15/2015    Procedure: INTRATHECAL PUMP TRIAL;  Surgeon: Sweta Valle MD;  Location: [address] [place] [address]   • PATIENT WITH PREOPERATIVE ORDER FOR IV ANTIBIOTIC SURGICAL SITE INFECT  2/3/2014    Procedure: STIMULATOR TRIAL EPIDURAL LEAD;  Surgeon: Anna Malone MD;  Location: [address] [place] [address]  PAIN MANAGEMENT   • PERCUT IMPLNT [address]  12/18/2013    Procedure: STIMULATOR TRIAL EPIDURAL LEAD;  Surgeon: Anna Malone MD;  Location: [address] [place] [address] AM-PAC '6-Clicks' INPATIENT SHORT FORM - BASIC MOBILITY  How much difficulty does the patient currently have. ..  -   Turning over in bed (including adjusting bedclothes, sheets and blankets)?: None   -   Sitting down on and standing up from a chair wit mobility  Body mechanics  Gait training  Posture  Transfer training    Patient End of Session: Up in chair; With [facility] staff;Needs met;Call light within reach;RN aware of session/findings; All patient questions and concerns addressed; Alarm set; Discussed recomme training;Strengthening;Stoop training;Transfer training;Balance training  Rehab Potential : Good  Frequency (Obs): 3-5x/week  Number of Visits to Meet Established Goals: 3      CURRENT GOALS    Goal #1 Patient is able to demonstrate supine - sit EOB @ leve

## 2021-07-27 NOTE — ED QUICK NOTES
Orders for admission, patient is aware of plan and ready to go upstairs. Any questions, please call ED RN [address]  at extension [phone]. Vaccinated?  YES  Type of COVID test sent:RAPID  COVID Suspicion level: Low      Titratable drug(s) infusing: NA  Rate:N

## 2021-07-27 NOTE — CM/SW NOTE
07/27/21 1100   CM/SW Referral Data   Referral Source Social Work (self-referral)   Reason for Referral Discharge planning   Informant Patient   Patient Info   Patient's Mental Status Alert;Oriented   Patient's Home Environment House   Patient lives wit

## 2021-07-27 NOTE — H&P
NGA HOSPITALIST  History and Physical     Viridiana Deandre Patient Status:  Inpatient    3/8/1940 MRN [id number]   [facility] 2NE-A Attending Lila Ahumada MD   Hosp Day # 0 PCP Evin Mazariegos MD     Chief Complaint: Syncope     Hi EPIDURAL - LUMBAR;  Surgeon: James Hall MD;  Location: [address] [place] [address] GI & Luz Fuelling NDL/CATH SPI DX/THER Panjuancarlos Thompson [address]  4/28/2014    Procedure: INTRATHECAL PUMP TRIAL;  Surgeon: James Hall MD;  Location: [address] [place] [address] MANAGEMENT   • M-SEDAJ BY  PHYS [facility] Y[facility]  2/3/2014    Procedure: STIMULATOR TRIAL PERIPHERAL;  Surgeon: Martine Hensley MD;  Location: [facility] FOR PAIN MANAGEMENT   • M-SEDAJ BY  PHYS [address] [facility] 59  N [facility] 5+ YR  4/28/2014    Procedure: INTRATHECAL P • PATIENT [place] PREOPERATIVE ORDER FOR IV ANTIBIOTIC SURGICAL SITE INFECTION PROPHYLAXIS.   12/18/2013    Procedure: STIMULATOR TRIAL EPIDURAL LEAD;  Surgeon: Kirill Juarez MD;  Location: [address] [place] [address] MANAGEMENT   • PATIENT [address] does not use drugs.     Family History:   Family History   Problem Relation Age of Onset   • Hypertension Father    • Heart Disorder Father    • Hypertension Mother    • Heart Disorder Mother     reviewed    Allergies:   Neomycin                SWELLING, PA mouth 2 (two) times a day., Disp: , Rfl:   psyllium 28 % Oral Powd Pack, Take 1 packet by mouth 2 (two) times daily. , Disp: 15 packet, Rfl: 0  bisacodyl 5 MG Oral Tab EC, Take 1 tablet (5 mg total) by mouth daily as needed for constipation. , Disp: 15 table organomegaly. Neurologic: No focal neurological deficits. CNII-XII grossly intact. Musculoskeletal: Moves all extremities. Extremities: No edema or cyanosis. Integument: No rashes or lesions. Psychiatric: Appropriate mood and affect.       Diagnostic COBY.

## 2021-07-27 NOTE — PROGRESS NOTES
NGA HOSPITALIST  Progress Note     Ariana Bautista Patient Status:  Inpatient    3/8/1940 MRN [id number]   [facility] 2NE-A Attending Ramesh Pittman MD   Hosp Day # 1 PCP Medina Turner MD     Chief Complaint: sob    S: Patient still Daily   • psyllium  1 packet Oral BID   • Heparin Sodium (Porcine)  5,000 Units Subcutaneous Q8H [facility]   • cefTRIAXone  1 g Intravenous Q24H   • azithromycin  500 mg Intravenous Q24H       ASSESSMENT / PLAN:     1.  Fall suspect due to hypoxia, pneumonia of R

## 2021-07-28 LAB
ANION GAP SERPL CALC-SCNC: 3 MMOL/L (ref 0–18)
BASOPHILS # BLD AUTO: 0.02 X10(3) UL (ref 0–0.2)
BASOPHILS NFR BLD AUTO: 0.2 %
BUN BLD-MCNC: 16 MG/DL (ref 7–18)
BUN/CREAT SERPL: 21.6 (ref 10–20)
CALCIUM BLD-MCNC: 8 MG/DL (ref 8.5–10.1)
CHLORIDE SERPL-SCNC: 97 MMOL/L (ref 98–112)
CO2 SERPL-SCNC: 30 MMOL/L (ref 21–32)
CREAT BLD-MCNC: 0.74 MG/DL
DEPRECATED RDW RBC AUTO: 52.5 FL (ref 35.1–46.3)
EOSINOPHIL # BLD AUTO: 0.03 X10(3) UL (ref 0–0.7)
EOSINOPHIL NFR BLD AUTO: 0.3 %
ERYTHROCYTE [DISTWIDTH] IN BLOOD BY AUTOMATED COUNT: 16.2 % (ref 11–15)
GLUCOSE BLD-MCNC: 107 MG/DL (ref 70–99)
HCT VFR BLD AUTO: 32.4 %
HGB BLD-MCNC: 10.7 G/DL
IMM GRANULOCYTES # BLD AUTO: 0.05 X10(3) UL (ref 0–1)
IMM GRANULOCYTES NFR BLD: 0.4 %
LYMPHOCYTES # BLD AUTO: 1.07 X10(3) UL (ref 1–4)
LYMPHOCYTES NFR BLD AUTO: 9.2 %
MCH RBC QN AUTO: 29.2 PG (ref 26–34)
MCHC RBC AUTO-ENTMCNC: 33 G/DL (ref 31–37)
MCV RBC AUTO: 88.3 FL
MONOCYTES # BLD AUTO: 0.91 X10(3) UL (ref 0.1–1)
MONOCYTES NFR BLD AUTO: 7.8 %
NEUTROPHILS # BLD AUTO: 9.57 X10 (3) UL (ref 1.5–7.7)
NEUTROPHILS # BLD AUTO: 9.57 X10(3) UL (ref 1.5–7.7)
NEUTROPHILS NFR BLD AUTO: 82.1 %
OSMOLALITY SERPL CALC.SUM OF ELEC: 272 MOSM/KG (ref 275–295)
PLATELET # BLD AUTO: 202 10(3)UL (ref 150–450)
POTASSIUM SERPL-SCNC: 4.1 MMOL/L (ref 3.5–5.1)
RBC # BLD AUTO: 3.67 X10(6)UL
SODIUM SERPL-SCNC: 130 MMOL/L (ref 136–145)
WBC # BLD AUTO: 11.7 X10(3) UL (ref 4–11)

## 2021-07-28 PROCEDURE — 99232 SBSQ HOSP IP/OBS MODERATE 35: CPT | Performed by: INTERNAL MEDICINE

## 2021-07-28 RX ORDER — FENTANYL 12 UG/H
1 PATCH TRANSDERMAL
Qty: 2 PATCH | Refills: 0 | Status: SHIPPED | OUTPATIENT
Start: 2021-07-28 | End: 2021-09-30

## 2021-07-28 RX ORDER — NALOXONE HYDROCHLORIDE 4 MG/.1ML
4 SPRAY, METERED NASAL AS NEEDED
Qty: 1 KIT | Refills: 0 | Status: SHIPPED | OUTPATIENT
Start: 2021-07-28

## 2021-07-28 RX ORDER — PREGABALIN 50 MG/1
50 CAPSULE ORAL DAILY
Qty: 2 CAPSULE | Refills: 0 | Status: SHIPPED | OUTPATIENT
Start: 2021-07-28 | End: 2021-07-29

## 2021-07-28 RX ORDER — CEFUROXIME AXETIL 500 MG/1
500 TABLET ORAL 2 TIMES DAILY
Qty: 14 TABLET | Refills: 0 | Status: SHIPPED | OUTPATIENT
Start: 2021-07-28 | End: 2021-08-04

## 2021-07-28 RX ORDER — FENTANYL 25 UG/H
1 PATCH TRANSDERMAL
Qty: 2 PATCH | Refills: 0 | Status: SHIPPED | OUTPATIENT
Start: 2021-07-31 | End: 2021-09-30

## 2021-07-28 RX ORDER — CLONAZEPAM 2 MG/1
2 TABLET ORAL NIGHTLY
Qty: 10 TABLET | Refills: 0 | Status: SHIPPED | OUTPATIENT
Start: 2021-07-28 | End: 2021-08-10

## 2021-07-28 NOTE — PHYSICAL THERAPY NOTE
PHYSICAL THERAPY TREATMENT NOTE - INPATIENT    Room Number: 2625/2625-A     Session: 1   Number of Visits to Meet Established Goals: 3          History related to current admission: Patient is a 78year old male admitted on 7/26/2021 from home with c/o fa disorder of thyroid    • Unspecified essential hypertension    • Unspecified gastritis and gastroduodenitis without mention of hemorrhage    • Unspecified hypothyroidism    • Weight loss        Past Surgical History  Past Surgical History:   Procedure Late Bekah Bah MD;  Location: [address] [place] [address]   • LASER SURGERY OF EYE  6/09    cataract both eyes 2 weeks apart   • M-SEDAJ BY  [facility] [place]  9/7/2012    Procedure: TRANSFORAMINAL EPIDURAL - LUMBAR;  Surgeon: Eliz Doran MD;  [facility]   • PATIENT WITH PREOPERATIVE ORDER FOR IV ANTIBIOTIC SURGICAL SITE INFECT  2/3/2014    Procedure: STIMULATOR TRIAL PERIPHERAL;  Surgeon: Larry Marc MD;  Location: [facility]  PAIN MANAGEMENT   • PATIENT [facility] NEUROELECT,EPIDURAL  12/18/2013    Procedure: STIMULATOR TRIAL EPIDURAL LEAD;  Surgeon: Kirill Juarez MD;  Location: [facility] FOR PAIN MANAGEMENT   • SIGMOIDOSCOPY,DIAGNOSTIC     • SPINE SURGERY PROCEDURE UNLISTED      CERVICAL FUSION X3/HARDWARE, lumb walker  Pattern:  (flexed posture)  Stoop/Curb Assistance: Not tested       Skilled Therapy Provided: Pt received sitting in chair agreeable to PT. Pt BP taken throughout session (see below). Pt performed sit>stand CGA.  Pt reports slight dizziness that dec education; Body mechanics;Gait training;Strengthening;Stoop training;Transfer training;Balance training  Rehab Potential : Good  Frequency (Obs): 3-5x/week    CURRENT GOALS   Goal #1 Patient is able to demonstrate supine - sit EOB @ level: modified independ

## 2021-07-28 NOTE — OCCUPATIONAL THERAPY NOTE
OCCUPATIONAL THERAPY  EVALUATION - INPATIENT     Room Number: 2625/2625-A  Evaluation Date: 7/28/2021  Type of Evaluation: Initial  Presenting Problem: syncope, PNA    Physician Order: IP Consult to Occupational Therapy  Reason for Therapy: ADL/IADL Dysfun profile/medical history, specific performance deficits impacting engagement in ADL/IADL, client assessment/performance deficits, and clinical decision making): LOW    OT Discharge Recommendations: Home with home health PT/OT; Intermittent Supervision  OT Christos Sahu chair  UB dressing: NT  LB dressing:    Socks: Doffs/dons supervision via figure 4, increased time to complete  Toileting: CGA for standing balance during pants management, encouragement for self-care responsibility with aniyah care.    Grooming: SBA hand was supervision and AE PRN  Patient will perform LB dressing with supervision and AE PRN    Functional Transfer Goals  Patient will perform bed mobility supine to sit with supervision  Patient will perform bed mobility sit to supine with supervision  Patient w

## 2021-07-28 NOTE — PLAN OF CARE
Pt rec'd sleeping, easily aroused. Pt denies any distress or discomfort @ this time. The pt was agreeable to getting up in cahir for meals. He also was agreeable to taking a walk.   The pt has difficulty getting out of bed and balancing himself as he pre saturation or ABGs  - Provide Smoking Cessation handout, if applicable  - Encourage broncho-pulmonary hygiene including cough, deep breathe, Incentive Spirometry  - Assess the need for suctioning and perform as needed  - Assess and instruct to report SOB o

## 2021-07-28 NOTE — PLAN OF CARE
Assumed care of pt at 0200. Pt with fentanyl patches x2 to back for chronic back and hip pain, pt declining tylenol and hot packs to help with breakthrough pain as needed. Remains on iv antibx.  Pt up with assistance to bsc to void, will continiue to [facility]

## 2021-07-28 NOTE — CM/SW NOTE
FANI spoke with RN, whom stated that PT/OT is going to recommend LEXX. Await updated PT/OT notes. FANI sent LEXX referrals in [place]. DON called. Will await choice list to provide to the pt.      Ninfa Avila, MSW, LSW   / Discharge Planner  x6

## 2021-07-28 NOTE — DISCHARGE SUMMARY
[facility] [place] HOSPITALIST  DISCHARGE SUMMARY     John Zuleta Patient Status:  Inpatient    3/8/1940 MRN [id number]   [facility] 2NE-A Attending Emy Russell MD   [address] Day # 3 PCP Sandra Kelley MD     Date of Admission: 2021  Tu medications      Instructions Prescription details   fentaNYL 12 MCG/HR Pt72  Commonly known as: [address]  What changed: Another medication with the same name was removed. Continue taking this medication, and follow the directions you see here.       Place times daily as needed. Quantity: 30 tablet  Refills: 0     Melatonin 10 MG Caps      Take 15 mg by mouth. Refills: 0     ofloxacin 0.3 % Soln  Commonly known as: OCUFLOX      Place 2 drops into the left eye 2 (two) times daily as needed.    Refills: 0 127/66    -----------------------------------------------------------------------------------------------  PATIENT DISCHARGE INSTRUCTIONS: See electronic chart    Heriberto Corral MD    Time spent:  > 30 minutes

## 2021-07-28 NOTE — PROGRESS NOTES
NGA HOSPITALIST  Progress Note     Alla Clifford Patient Status:  Inpatient    3/8/1940 MRN [id number]   [facility] 2NE-A Attending Juany Hopson MD   Hosp Day # 2 PCP Abram Anderson MD     Chief Complaint: sob    S: Patient witho fentaNYL  1 patch Transdermal Q72H   • Levothyroxine Sodium  88 mcg Oral Before breakfast   • pantoprazole  40 mg Oral Before breakfast   • pregabalin  50 mg Oral Daily   • psyllium  1 packet Oral BID   • Heparin Sodium (Porcine)  5,000 Units Subcutaneous

## 2021-07-28 NOTE — SLP NOTE
SPEECH DAILY NOTE - INPATIENT    ASSESSMENT & PLAN   ASSESSMENT  Pt seen for dysphagia tx to assess tolerance with recommended diet, ensure proper utilization of aspiration precautions and provide pt/family education.  Pt found sitting up in bed alert and o M.S., CCC-SLP/L  Speech-Language Pathologist

## 2021-07-29 VITALS
SYSTOLIC BLOOD PRESSURE: 127 MMHG | RESPIRATION RATE: 18 BRPM | BODY MASS INDEX: 17 KG/M2 | HEART RATE: 93 BPM | WEIGHT: 118.13 LBS | DIASTOLIC BLOOD PRESSURE: 66 MMHG | OXYGEN SATURATION: 96 % | TEMPERATURE: 98 F

## 2021-07-29 PROCEDURE — 99239 HOSP IP/OBS DSCHRG MGMT >30: CPT | Performed by: INTERNAL MEDICINE

## 2021-07-29 NOTE — PLAN OF CARE
Pt is A&Ox4, forgetful. RA, lungs clear/diminished. NSR on tele HR 90s. Denies cardiovascular symptoms. Incontinent at times, briefed. LBM 7/28. Denies pain. Up x1 walker. PIVs flush well. IV abx for pna, pna protocol.  Abdominal binder applied for + ortho respiratory difficulty  - Respiratory Therapy support as indicated  - Manage/alleviate anxiety  - Monitor for signs/symptoms of CO2 retention  Outcome: Progressing

## 2021-07-29 NOTE — CM/SW NOTE
SW met with pt at bedside to follow up on possible discharge plans. Informed pt that because PT/OT are not recommending LEXX, pt is very limited to LEXX placement. Explained that pt may get a week or so of therapy and then they'd work on discharging home.  Gali Ramirez

## 2021-07-29 NOTE — CM/SW NOTE
SW spoke with [facility]. He stated that the conversation with the pt/spouse went very well and they are going to plan on doing a week of LEXX at their facility to gain strength back. Await discharge clearance.  [facility] is able to accommodate pt tod

## 2021-07-29 NOTE — PROGRESS NOTES
NGA HOSPITALIST  Progress Note     Lisa Look Patient Status:  Inpatient    3/8/1940 MRN [id number]   [facility] 2NE-A Attending Maryjo Lewis MD   Hosp Day # 3 PCP Hari Guerra MD     Chief Complaint: sob    S: Patient witho Transdermal Q72H   • fentaNYL  1 patch Transdermal Q72H   • Levothyroxine Sodium  88 mcg Oral Before breakfast   • pantoprazole  40 mg Oral Before breakfast   • psyllium  1 packet Oral BID   • Heparin Sodium (Porcine)  5,000 Units Subcutaneous Q8H [facility]   •

## 2021-07-29 NOTE — CM/SW NOTE
Spoke with PT and OT. They are recommending [place] [address] services, not [facility]X. Pt did get into two places for [address] and [place]. Pt is voicing that he wants to go to "[facility], however, "[facility] is not available for LEXX.  SW will follow

## 2021-07-29 NOTE — CM/SW NOTE
07/29/21 1516   Discharge disposition   Expected discharge disposition Skilled Nurs   Name of Facillity/Home Care/Hospice SNF Other  ([facility])   Patient is Discharged to a [address] [place] [address] Yes   [facility]     Pt

## 2021-07-29 NOTE — PLAN OF CARE
Assumed care of the patient at 07:00 AM. Patient is alert and oriented x4. Bilateral breath sounds are clear. Room air. Is 1750. NSR on tele. Orthostatic blood pressure positive. Abdominal binder in place. Patient reports last bowel movement was 7/28/21.  U optimize hemodynamic stability  - Monitor arterial and/or venous puncture sites for bleeding and/or hematoma  - Assess quality of pulses, skin color and temperature  - Assess for signs of decreased coronary artery perfusion - ex.  Angina  - Evaluate fluid b environment  Outcome: Progressing     Problem: MUSCULOSKELETAL - ADULT  Goal: Return mobility to safest level of function  Description: INTERVENTIONS:  - Assess patient stability and activity tolerance for standing, transferring and ambulating w/ or w/o as

## 2021-08-06 NOTE — ED PROVIDER NOTES
Patient Seen in: [facility] 2ne-a      History   Patient presents with:  Syncope  Fall    Stated Complaint: Fall No LOC No thinners    HPI/Subjective:   HPI    Patient's wife went out for approximately 1 to 2 hours, came back home and found patient Luz Fuelling NDL/CATH SPI DX/THER NJX  9/28/2012    Procedure: TRANSFORAMINAL EPIDURAL - LUMBAR;  Surgeon: Jmaes Hall MD;  Location: [address] [place] [address] CAMILO & Luz Fuelling NDL/CATH SPI DX/THER NJX  4/28/2014    Procedure: INTRATHECAL PUMP TRI EPIDURAL LEAD;  Surgeon: Celi Rollins MD;  Location: [facility] FOR PAIN MANAGEMENT   • M-SEDAJ BY [facility] [address] .[address]. [facility] 59  N [facility] 5[place] YR  2/3/2014    Procedure: STIMULATOR TRIAL PERIPHERAL;  Surgeon: Celi Rollins MD;  Location: [address] [place] [address] Dr PAIN MANAGEMENT   • LUMBAR;  Surgeon: Lucien Jacinto MD;  Location: [facility] FOR PAIN MANAGEMENT   • PATIENT [address] FOR IV ANTIBIOTIC SURGICAL SITE INFECTION PROPHYLAXIS.   12/18/2013    Procedure: STIMULATOR TRIAL EPIDURAL LEAD;  Surgeon: Lucien Jacinto status: Never Smoker      Smokeless tobacco: Never Used    Vaping Use      Vaping Use: Never used    Alcohol use: No    Drug use: No             Review of Systems    Positive for stated complaint: Fall No LOC No thinners  Other systems are as noted in HPI. Notable for the following components:    Procalcitonin 0.62 (*)     All other components within normal limits    Narrative:     Resulted by: batch: PBNP, CO2, CL, NA, ALB, TBIL, ALT, ALP, CA, CREA, BUN, GLUC,           Resulted by: [id number]: K, AST,    BASIC Abnormality         Status                     ---------                               -----------         ------                     CBC W/ DIFFERENTIAL[[id number]]          Abnormal            Final result                 Please view results for th Clinical Impression:  Syncope, unspecified syncope type  (primary encounter diagnosis)  Hyponatremia  Weakness generalized  Primary insomnia  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

## 2021-08-10 ENCOUNTER — OFFICE VISIT (OUTPATIENT)
Dept: FAMILY MEDICINE CLINIC | Facility: CLINIC | Age: 81
End: 2021-08-10
Payer: MEDICARE

## 2021-08-10 ENCOUNTER — LAB ENCOUNTER (OUTPATIENT)
Dept: LAB | Age: 81
End: 2021-08-10
Attending: FAMILY MEDICINE
Payer: MEDICARE

## 2021-08-10 VITALS
SYSTOLIC BLOOD PRESSURE: 128 MMHG | WEIGHT: 119 LBS | OXYGEN SATURATION: 97 % | HEART RATE: 73 BPM | DIASTOLIC BLOOD PRESSURE: 68 MMHG | BODY MASS INDEX: 17.04 KG/M2 | HEIGHT: 70 IN

## 2021-08-10 DIAGNOSIS — R73.9 HYPERGLYCEMIA: ICD-10-CM

## 2021-08-10 DIAGNOSIS — E03.9 HYPOTHYROIDISM, UNSPECIFIED TYPE: ICD-10-CM

## 2021-08-10 DIAGNOSIS — J18.9 COMMUNITY ACQUIRED PNEUMONIA OF RIGHT MIDDLE LOBE OF LUNG: Primary | ICD-10-CM

## 2021-08-10 DIAGNOSIS — F51.01 PRIMARY INSOMNIA: ICD-10-CM

## 2021-08-10 DIAGNOSIS — R10.30 LOWER ABDOMINAL PAIN: ICD-10-CM

## 2021-08-10 DIAGNOSIS — B35.1 ONYCHOMYCOSIS OF TOENAIL: ICD-10-CM

## 2021-08-10 DIAGNOSIS — L53.9 ERYTHEMA OF FOOT: ICD-10-CM

## 2021-08-10 DIAGNOSIS — R35.0 URINARY FREQUENCY: ICD-10-CM

## 2021-08-10 DIAGNOSIS — F06.8 ANXIETY DISORDER DUE TO MULTIPLE MEDICAL PROBLEMS: ICD-10-CM

## 2021-08-10 LAB
APPEARANCE: CLEAR
BILIRUBIN: NEGATIVE
EST. AVERAGE GLUCOSE BLD GHB EST-MCNC: 126 MG/DL (ref 68–126)
GLUCOSE (URINE DIPSTICK): NEGATIVE MG/DL
HBA1C MFR BLD HPLC: 6 % (ref ?–5.7)
KETONES (URINE DIPSTICK): NEGATIVE MG/DL
LEUKOCYTES: NEGATIVE
MULTISTIX LOT#: 5077 NUMERIC
NITRITE, URINE: NEGATIVE
OCCULT BLOOD: NEGATIVE
PH, URINE: 7 (ref 4.5–8)
PROTEIN (URINE DIPSTICK): NEGATIVE MG/DL
SPECIFIC GRAVITY: 1.02 (ref 1–1.03)
T4 FREE SERPL-MCNC: 1.2 NG/DL (ref 0.8–1.7)
TSI SER-ACNC: 8.77 MIU/ML (ref 0.36–3.74)
URATE SERPL-MCNC: 3.1 MG/DL
URINE-COLOR: YELLOW
UROBILINOGEN,SEMI-QN: 0.2 MG/DL (ref 0–1.9)

## 2021-08-10 PROCEDURE — 1111F DSCHRG MED/CURRENT MED MERGE: CPT | Performed by: FAMILY MEDICINE

## 2021-08-10 PROCEDURE — 83036 HEMOGLOBIN GLYCOSYLATED A1C: CPT

## 2021-08-10 PROCEDURE — 81003 URINALYSIS AUTO W/O SCOPE: CPT | Performed by: FAMILY MEDICINE

## 2021-08-10 PROCEDURE — 84439 ASSAY OF FREE THYROXINE: CPT

## 2021-08-10 PROCEDURE — 99215 OFFICE O/P EST HI 40 MIN: CPT | Performed by: FAMILY MEDICINE

## 2021-08-10 PROCEDURE — 84550 ASSAY OF BLOOD/URIC ACID: CPT

## 2021-08-10 PROCEDURE — 84443 ASSAY THYROID STIM HORMONE: CPT

## 2021-08-10 PROCEDURE — 36415 COLL VENOUS BLD VENIPUNCTURE: CPT

## 2021-08-10 RX ORDER — CLONAZEPAM 2 MG/1
2 TABLET ORAL NIGHTLY
Qty: 30 TABLET | Refills: 2 | Status: SHIPPED | OUTPATIENT
Start: 2021-08-10 | End: 2021-11-09

## 2021-08-10 NOTE — PROGRESS NOTES
Scheduled as a hospital follow-up for pneumonia. He had a syncopal event. Brain CT shows atrophy consistent with 78years old but no acute changes. X-ray showed right middle and lower lobe pneumonia. He is finished with Ceftin.   No recent fevers chills disorder of thyroid    • Unspecified essential hypertension    • Unspecified gastritis and gastroduodenitis without mention of hemorrhage    • Unspecified hypothyroidism    • Weight loss      PAST SURGICAL HISTORY:  Past Surgical History:   Procedure Later Jazmin Lopez MD;  Location: [address] [place] [address]   • LASER SURGERY OF EYE  6/09    cataract both eyes 2 weeks apart   • M-SEDAJ BY  WOJCIECH ACOSTA [facility] 5+ YR  9/7/2012    Procedure: TRANSFORAMINAL EPIDURAL - LUMBAR;  Surgeon: Rusty Mayorga MD;  [facility]   • PATIENT WITH PREOPERATIVE ORDER FOR IV ANTIBIOTIC SURGICAL SITE INFECT  2/3/2014    Procedure: STIMULATOR TRIAL PERIPHERAL;  Surgeon: Sebastian Suero MD;  Location: [facility]  PAIN MANAGEMENT   • PATIENT [facility] NEUROELECT,EPIDURAL  12/18/2013    Procedure: STIMULATOR TRIAL EPIDURAL LEAD;  Surgeon: Bebeto Centeno MD;  Location: [facility] FOR PAIN MANAGEMENT   • SIGMOIDOSCOPY,DIAGNOSTIC     • SPINE SURGERY PROCEDURE UNLISTED      CERVICAL FUSION X3/HARDWARE, lumb by Nasal route as needed. If patient remains unresponsive, repeat dose in other nostril 2-5 minutes after first dose. (Patient not taking: Reported on 8/10/2021 ) 1 kit 0   • PEG 3350 17 g Oral Powd Pack Take 17 g by mouth daily.    (Patient not taking: Rep Lower abdominal pain  Urinalysis unremarkable. Likely mechanical    5. Onychomycosis of toenail    - Ciclopirox 8 % External Solution; Apply to nail every night. Remove with alcohol after 7 days.   Dispense: 6.6 mL; Refill: 2  We discussed use of this med

## 2021-08-11 DIAGNOSIS — E03.9 HYPOTHYROIDISM, UNSPECIFIED TYPE: Primary | ICD-10-CM

## 2021-08-11 RX ORDER — LEVOTHYROXINE SODIUM 0.1 MG/1
100 TABLET ORAL DAILY
Qty: 30 TABLET | Refills: 1 | Status: SHIPPED | OUTPATIENT
Start: 2021-08-11 | End: 2021-09-02

## 2021-09-02 DIAGNOSIS — E03.9 HYPOTHYROIDISM, UNSPECIFIED TYPE: ICD-10-CM

## 2021-09-02 RX ORDER — LEVOTHYROXINE SODIUM 0.1 MG/1
100 TABLET ORAL DAILY
Qty: 30 TABLET | Refills: 5 | Status: SHIPPED | OUTPATIENT
Start: 2021-09-02 | End: 2022-10-20

## 2021-10-09 ENCOUNTER — HOSPITAL ENCOUNTER (EMERGENCY)
Age: 81
Discharge: HOME | End: 2021-10-09
Attending: EMERGENCY MEDICINE
Payer: MEDICARE

## 2021-10-09 VITALS
TEMPERATURE: 98 F | HEART RATE: 89 BPM | RESPIRATION RATE: 15 BRPM | SYSTOLIC BLOOD PRESSURE: 150 MMHG | WEIGHT: 116 LBS | HEIGHT: 70 IN | BODY MASS INDEX: 16.61 KG/M2 | DIASTOLIC BLOOD PRESSURE: 99 MMHG | OXYGEN SATURATION: 97 %

## 2021-10-09 DIAGNOSIS — B02.9 HERPES ZOSTER WITHOUT COMPLICATION: Primary | ICD-10-CM

## 2021-10-09 PROCEDURE — 99283 EMERGENCY DEPT VISIT LOW MDM: CPT | Performed by: EMERGENCY MEDICINE

## 2021-10-09 RX ORDER — VALACYCLOVIR HYDROCHLORIDE 1 G/1
1 TABLET, FILM COATED ORAL 3 TIMES DAILY
Qty: 21 TABLET | Refills: 0 | Status: SHIPPED | OUTPATIENT
Start: 2021-10-09 | End: 2021-10-16

## 2021-10-09 NOTE — ED PROVIDER NOTES
Patient Seen in: [address] [facility] In [place]      History   Patient presents with:  Rash Skin Problem    Stated Complaint: right leg rash- shingles     Subjective:   HPI    49-year-old male comes to the hospital with a complaint of having a Other systems are as noted in HPI. Constitutional and vital signs reviewed. All other systems reviewed and negative except as noted above.     Physical Exam     ED Triage Vitals [10/09/21 1040]   BP (!) 150/99   Pulse 89   Resp 15   Temp 98 °F (36.7 diagnosis)     Disposition:  Discharge  10/9/2021 10:43 am    Follow-up:  Ryder Coats MD   [address] [facility] [address] [address]    Schedule an appointment as soon as possible for a visit in 2 days            Medications Prescribed:

## 2021-10-17 DIAGNOSIS — E03.9 HYPOTHYROIDISM, UNSPECIFIED TYPE: Chronic | ICD-10-CM

## 2021-10-18 RX ORDER — LEVOTHYROXINE SODIUM 88 UG/1
TABLET ORAL
Qty: 90 TABLET | Refills: 1 | Status: SHIPPED | OUTPATIENT
Start: 2021-10-18

## 2021-11-09 ENCOUNTER — PATIENT MESSAGE (OUTPATIENT)
Dept: FAMILY MEDICINE CLINIC | Facility: CLINIC | Age: 81
End: 2021-11-09

## 2021-11-09 DIAGNOSIS — F51.01 PRIMARY INSOMNIA: ICD-10-CM

## 2021-11-09 DIAGNOSIS — F06.8 ANXIETY DISORDER DUE TO MULTIPLE MEDICAL PROBLEMS: ICD-10-CM

## 2021-11-09 RX ORDER — CLONAZEPAM 2 MG/1
2 TABLET ORAL NIGHTLY
Qty: 30 TABLET | Refills: 2 | Status: SHIPPED | OUTPATIENT
Start: 2021-11-09

## 2021-11-09 NOTE — TELEPHONE ENCOUNTER
From: Gibran Zuleta  To: Taty Alvarez MD  Sent: 11/9/2021 10:37 AM CST  Subject: Prescription Question    Dr. Emmanuel Templeton,  May I get a prescription for Clonazepam (2mg) with refills to help me sleep.    Thank You,  Armida White

## 2021-12-26 ENCOUNTER — HOSPITAL ENCOUNTER (EMERGENCY)
Age: 81
Discharge: HOME | End: 2021-12-26
Attending: EMERGENCY MEDICINE
Payer: MEDICARE

## 2021-12-26 VITALS
WEIGHT: 117 LBS | HEIGHT: 70 IN | SYSTOLIC BLOOD PRESSURE: 121 MMHG | OXYGEN SATURATION: 98 % | BODY MASS INDEX: 16.75 KG/M2 | TEMPERATURE: 100 F | RESPIRATION RATE: 18 BRPM | HEART RATE: 98 BPM | DIASTOLIC BLOOD PRESSURE: 75 MMHG

## 2021-12-26 DIAGNOSIS — H04.302 LACRIMAL DUCT INFECTION, LEFT: Primary | ICD-10-CM

## 2021-12-26 DIAGNOSIS — L02.91 ABSCESS: ICD-10-CM

## 2021-12-26 PROCEDURE — 87070 CULTURE OTHR SPECIMN AEROBIC: CPT | Performed by: EMERGENCY MEDICINE

## 2021-12-26 PROCEDURE — 99283 EMERGENCY DEPT VISIT LOW MDM: CPT

## 2021-12-26 PROCEDURE — 87205 SMEAR GRAM STAIN: CPT | Performed by: EMERGENCY MEDICINE

## 2021-12-26 PROCEDURE — 10060 I&D ABSCESS SIMPLE/SINGLE: CPT

## 2021-12-26 PROCEDURE — 87077 CULTURE AEROBIC IDENTIFY: CPT | Performed by: EMERGENCY MEDICINE

## 2021-12-26 PROCEDURE — 99284 EMERGENCY DEPT VISIT MOD MDM: CPT

## 2021-12-26 PROCEDURE — 10061 I&D ABSCESS COMP/MULTIPLE: CPT

## 2021-12-26 RX ORDER — LEVOFLOXACIN 500 MG/1
500 TABLET, FILM COATED ORAL ONCE
Status: COMPLETED | OUTPATIENT
Start: 2021-12-26 | End: 2021-12-26

## 2021-12-26 RX ORDER — LEVOFLOXACIN 500 MG/1
500 TABLET, FILM COATED ORAL DAILY
Qty: 10 TABLET | Refills: 0 | Status: SHIPPED | OUTPATIENT
Start: 2021-12-26 | End: 2022-01-05

## 2021-12-26 NOTE — ED PROVIDER NOTES
Patient Seen in: [facility] In [place]      History   Patient presents with:  Eye Pain  Rash Skin Problem    Stated Complaint: left eye redness and swelling one week    Subjective:   HPI    13-year-old male who comes in with left eye well.    Significant amount of purulent material drained. He was advised to see the ophthalmologist tomorrow.     Initial dose of Levaquin given in the emergency department    Differential diagnosis before testing included lacrimal duct infection versus

## 2022-01-03 ENCOUNTER — TELEPHONE (OUTPATIENT)
Dept: FAMILY MEDICINE CLINIC | Facility: CLINIC | Age: 82
End: 2022-01-03

## 2022-01-03 NOTE — TELEPHONE ENCOUNTER
WANTS TO KNOW IF  HAS TO FAST FOR LABS FOR VISIT TOMORROW. HE IS HAVING SURGERY AND SEEING DR IBARRA FOR THE PRE OP. PLS CALL TO ADVISE.

## 2022-01-04 ENCOUNTER — OFFICE VISIT (OUTPATIENT)
Dept: FAMILY MEDICINE CLINIC | Facility: CLINIC | Age: 82
End: 2022-01-04
Payer: MEDICARE

## 2022-01-04 ENCOUNTER — LAB ENCOUNTER (OUTPATIENT)
Dept: LAB | Age: 82
End: 2022-01-04
Attending: OPHTHALMOLOGY
Payer: MEDICARE

## 2022-01-04 VITALS
DIASTOLIC BLOOD PRESSURE: 80 MMHG | OXYGEN SATURATION: 98 % | HEIGHT: 70 IN | SYSTOLIC BLOOD PRESSURE: 124 MMHG | RESPIRATION RATE: 16 BRPM | WEIGHT: 117 LBS | BODY MASS INDEX: 16.75 KG/M2 | HEART RATE: 88 BPM

## 2022-01-04 DIAGNOSIS — Z01.818 PREOP EXAMINATION: Primary | ICD-10-CM

## 2022-01-04 DIAGNOSIS — Z01.818 PREOP EXAMINATION: ICD-10-CM

## 2022-01-04 DIAGNOSIS — Z01.818 PRE-PROCEDURAL EXAMINATION: ICD-10-CM

## 2022-01-04 DIAGNOSIS — E03.9 HYPOTHYROIDISM, UNSPECIFIED TYPE: ICD-10-CM

## 2022-01-04 DIAGNOSIS — H04.552 OBSTRUCTION OF LEFT TEAR DUCT: ICD-10-CM

## 2022-01-04 LAB
ALBUMIN SERPL-MCNC: 3.4 G/DL (ref 3.4–5)
ALBUMIN/GLOB SERPL: 1 {RATIO} (ref 1–2)
ALP LIVER SERPL-CCNC: 72 U/L
ALT SERPL-CCNC: 15 U/L
ANION GAP SERPL CALC-SCNC: 6 MMOL/L (ref 0–18)
AST SERPL-CCNC: 14 U/L (ref 15–37)
BASOPHILS # BLD AUTO: 0.03 X10(3) UL (ref 0–0.2)
BASOPHILS NFR BLD AUTO: 0.6 %
BILIRUB SERPL-MCNC: 1.2 MG/DL (ref 0.1–2)
BUN BLD-MCNC: 18 MG/DL (ref 7–18)
CALCIUM BLD-MCNC: 9.4 MG/DL (ref 8.5–10.1)
CHLORIDE SERPL-SCNC: 98 MMOL/L (ref 98–112)
CO2 SERPL-SCNC: 31 MMOL/L (ref 21–32)
CREAT BLD-MCNC: 1.14 MG/DL
EOSINOPHIL # BLD AUTO: 0.05 X10(3) UL (ref 0–0.7)
EOSINOPHIL NFR BLD AUTO: 0.9 %
ERYTHROCYTE [DISTWIDTH] IN BLOOD BY AUTOMATED COUNT: 13.9 %
FASTING STATUS PATIENT QL REPORTED: YES
GLOBULIN PLAS-MCNC: 3.4 G/DL (ref 2.8–4.4)
GLUCOSE BLD-MCNC: 103 MG/DL (ref 70–99)
HCT VFR BLD AUTO: 41.1 %
HGB BLD-MCNC: 13.4 G/DL
IMM GRANULOCYTES # BLD AUTO: 0.02 X10(3) UL (ref 0–1)
IMM GRANULOCYTES NFR BLD: 0.4 %
LYMPHOCYTES # BLD AUTO: 1.48 X10(3) UL (ref 1–4)
LYMPHOCYTES NFR BLD AUTO: 27.3 %
MCH RBC QN AUTO: 31.2 PG (ref 26–34)
MCHC RBC AUTO-ENTMCNC: 32.6 G/DL (ref 31–37)
MCV RBC AUTO: 95.6 FL
MONOCYTES # BLD AUTO: 0.46 X10(3) UL (ref 0.1–1)
MONOCYTES NFR BLD AUTO: 8.5 %
NEUTROPHILS # BLD AUTO: 3.39 X10 (3) UL (ref 1.5–7.7)
NEUTROPHILS # BLD AUTO: 3.39 X10(3) UL (ref 1.5–7.7)
NEUTROPHILS NFR BLD AUTO: 62.3 %
OSMOLALITY SERPL CALC.SUM OF ELEC: 282 MOSM/KG (ref 275–295)
PLATELET # BLD AUTO: 201 10(3)UL (ref 150–450)
POTASSIUM SERPL-SCNC: 4.4 MMOL/L (ref 3.5–5.1)
PROT SERPL-MCNC: 6.8 G/DL (ref 6.4–8.2)
RBC # BLD AUTO: 4.3 X10(6)UL
SODIUM SERPL-SCNC: 135 MMOL/L (ref 136–145)
T4 FREE SERPL-MCNC: 1.8 NG/DL (ref 0.8–1.7)
TSI SER-ACNC: 0.16 MIU/ML (ref 0.36–3.74)
WBC # BLD AUTO: 5.4 X10(3) UL (ref 4–11)

## 2022-01-04 PROCEDURE — 93000 ELECTROCARDIOGRAM COMPLETE: CPT | Performed by: FAMILY MEDICINE

## 2022-01-04 PROCEDURE — 84439 ASSAY OF FREE THYROXINE: CPT

## 2022-01-04 PROCEDURE — 99213 OFFICE O/P EST LOW 20 MIN: CPT | Performed by: FAMILY MEDICINE

## 2022-01-04 PROCEDURE — 36415 COLL VENOUS BLD VENIPUNCTURE: CPT

## 2022-01-04 PROCEDURE — 84443 ASSAY THYROID STIM HORMONE: CPT

## 2022-01-04 PROCEDURE — 80053 COMPREHEN METABOLIC PANEL: CPT

## 2022-01-04 PROCEDURE — 85025 COMPLETE CBC W/AUTO DIFF WBC: CPT

## 2022-01-04 NOTE — PROGRESS NOTES
Vane Noyola is a 78year old male who presents for a pre-operative physical exam.   Satnam Leerogerio Zuleta is scheduled for a DCR procedure at [facility] on 1/7/21 performed by Dr Williams Duarte.  Indication: blocked tear duct    HPI related to surgery: EPIDURAL - LUMBAR;  Surgeon: Israel Carlin MD;  Location: [address] [place] [address] [facility] SPI DX/THER Panjuancarlos Thompson 84  4/28/2014    Procedure: INTRATHECAL PUMP TRIAL;  Surgeon: Israel Carlin MD;  Location: [address] [place] [address] MANAGEMENT   • M[facility]SEDAJ [facility]  [facility]  2/3/2014    Procedure: STIMULATOR TRIAL PERIPHERAL;  Surgeon: Sedalia Pallas, MD;  Location: [facility] FOR PAIN MANAGEMENT   • M-SEDAJ BY  PHYS [address] [facility] 59  N [facility] YR  4/28/2014    Procedure: INTRATHECAL P • [facility] FOR IV ANTIBIOTIC SURGICAL SITE INFECTION PROPHYLAXIS.   12/18/2013    Procedure: STIMULATOR TRIAL EPIDURAL LEAD;  Surgeon: Elizabeth Mueller MD;  Location: [address] [place] [address] MANAGEMENT   • PATIENT [address] swelling  Bactrim [Sulfametho*        Comment:Dry mouth and stomach cramping  Sulfa Antibiotics         Versed                      Comment:Dizzy  Flonase [Fluticason*    FATIGUE    Comment:SUSP  Current Outpatient Medications   Medication Sig Dispense Ref SYSTEMS:   Negative ROS other than low back pain    PHYSICAL EXAM:   /80   Pulse 88   Resp 16   Ht 5' 10\" (1.778 m)   Wt 117 lb (53.1 kg)   SpO2 98%   BMI 16.79 kg/m²    ENT:  TM clear  CV: RRR, s1 and s2 present, no murmurs clicks or rubs  Resp: cl

## 2022-01-05 LAB — SARS-COV-2 RNA RESP QL NAA+PROBE: NOT DETECTED

## 2022-01-07 ENCOUNTER — LAB REQUISITION (OUTPATIENT)
Dept: LAB | Facility: HOSPITAL | Age: 82
End: 2022-01-07
Payer: MEDICARE

## 2022-01-07 DIAGNOSIS — Q10.5 CONGENITAL STENOSIS AND STRICTURE OF LACRIMAL DUCT: ICD-10-CM

## 2022-01-07 DIAGNOSIS — H04.552 ACQUIRED STENOSIS OF LEFT NASOLACRIMAL DUCT: ICD-10-CM

## 2022-01-07 PROCEDURE — 87075 CULTR BACTERIA EXCEPT BLOOD: CPT | Performed by: OPHTHALMOLOGY

## 2022-01-07 PROCEDURE — 87205 SMEAR GRAM STAIN: CPT | Performed by: OPHTHALMOLOGY

## 2022-01-07 PROCEDURE — 88304 TISSUE EXAM BY PATHOLOGIST: CPT | Performed by: OPHTHALMOLOGY

## 2022-01-07 PROCEDURE — 87070 CULTURE OTHR SPECIMN AEROBIC: CPT | Performed by: OPHTHALMOLOGY

## 2022-01-07 PROCEDURE — 87077 CULTURE AEROBIC IDENTIFY: CPT | Performed by: OPHTHALMOLOGY

## 2022-02-13 ENCOUNTER — PATIENT MESSAGE (OUTPATIENT)
Dept: FAMILY MEDICINE CLINIC | Facility: CLINIC | Age: 82
End: 2022-02-13

## 2022-02-14 RX ORDER — CLONAZEPAM 2 MG/1
2 TABLET ORAL NIGHTLY
Qty: 30 TABLET | Refills: 2 | Status: SHIPPED | OUTPATIENT
Start: 2022-02-14

## 2022-02-14 NOTE — TELEPHONE ENCOUNTER
From: Cely Zuleta  To: Stacey Parker MD  Sent: 2/13/2022 11:55 AM CST  Subject: Prescription     Dr Angus Ward,  Can I get a prescription for Clonazepam (2MG) with refills to help me sleep?   Thank You,  Allen Santacruz

## 2022-05-12 ENCOUNTER — PATIENT MESSAGE (OUTPATIENT)
Dept: FAMILY MEDICINE CLINIC | Facility: CLINIC | Age: 82
End: 2022-05-12

## 2022-05-12 RX ORDER — CLONAZEPAM 2 MG/1
2 TABLET ORAL NIGHTLY
Qty: 30 TABLET | Refills: 2 | Status: SHIPPED | OUTPATIENT
Start: 2022-05-12

## 2022-05-12 NOTE — TELEPHONE ENCOUNTER
From: Tess Zuleta  To: Gaudencio Goodman MD  Sent: 5/12/2022 1:04 PM CDT  Subject: Clonazepam    Dr. Aron Mendez,  Can I get a prescription for Clonazepam (2MG) with refills to help me sleep?   Thank You,  Lawrance Nyhan

## 2022-06-12 DIAGNOSIS — E78.00 PURE HYPERCHOLESTEROLEMIA: ICD-10-CM

## 2022-06-13 RX ORDER — ATORVASTATIN CALCIUM 10 MG/1
TABLET, FILM COATED ORAL
Qty: 90 TABLET | Refills: 0 | Status: SHIPPED | OUTPATIENT
Start: 2022-06-13

## 2022-08-10 DIAGNOSIS — F51.01 PRIMARY INSOMNIA: ICD-10-CM

## 2022-08-10 DIAGNOSIS — F06.8 ANXIETY DISORDER DUE TO MULTIPLE MEDICAL PROBLEMS: ICD-10-CM

## 2022-08-10 RX ORDER — CLONAZEPAM 2 MG/1
TABLET ORAL
Qty: 30 TABLET | Refills: 2 | Status: SHIPPED | OUTPATIENT
Start: 2022-08-10

## 2022-10-13 NOTE — TELEPHONE ENCOUNTER
Phoned in RX for Clonazepam per [facility] approval. [address] [facility] RN Pt dressed, up in recliner and transported to Phase 2.

## 2022-10-20 ENCOUNTER — LAB ENCOUNTER (OUTPATIENT)
Dept: LAB | Age: 82
End: 2022-10-20
Attending: FAMILY MEDICINE
Payer: MEDICARE

## 2022-10-20 ENCOUNTER — OFFICE VISIT (OUTPATIENT)
Dept: FAMILY MEDICINE CLINIC | Facility: CLINIC | Age: 82
End: 2022-10-20
Payer: MEDICARE

## 2022-10-20 VITALS
DIASTOLIC BLOOD PRESSURE: 62 MMHG | HEIGHT: 70 IN | BODY MASS INDEX: 16.75 KG/M2 | HEART RATE: 101 BPM | OXYGEN SATURATION: 91 % | WEIGHT: 117 LBS | RESPIRATION RATE: 14 BRPM | SYSTOLIC BLOOD PRESSURE: 110 MMHG

## 2022-10-20 DIAGNOSIS — R20.2 TINGLING IN EXTREMITIES: ICD-10-CM

## 2022-10-20 DIAGNOSIS — Z12.5 ENCOUNTER FOR SCREENING FOR MALIGNANT NEOPLASM OF PROSTATE: ICD-10-CM

## 2022-10-20 DIAGNOSIS — E53.8 VITAMIN B12 DEFICIENCY: ICD-10-CM

## 2022-10-20 DIAGNOSIS — R10.84 GENERALIZED ABDOMINAL PAIN: ICD-10-CM

## 2022-10-20 DIAGNOSIS — E55.9 VITAMIN D DEFICIENCY: ICD-10-CM

## 2022-10-20 DIAGNOSIS — E78.00 PURE HYPERCHOLESTEROLEMIA: ICD-10-CM

## 2022-10-20 DIAGNOSIS — E03.9 HYPOTHYROIDISM, UNSPECIFIED TYPE: ICD-10-CM

## 2022-10-20 DIAGNOSIS — Z91.89 HISTORY OF FAINTING: ICD-10-CM

## 2022-10-20 DIAGNOSIS — R30.9 PAINFUL URINATION: Primary | ICD-10-CM

## 2022-10-20 DIAGNOSIS — R30.9 PAINFUL URINATION: ICD-10-CM

## 2022-10-20 LAB
ALBUMIN SERPL-MCNC: 3.6 G/DL (ref 3.4–5)
ALBUMIN/GLOB SERPL: 0.9 {RATIO} (ref 1–2)
ALP LIVER SERPL-CCNC: 68 U/L
ALT SERPL-CCNC: 17 U/L
ANION GAP SERPL CALC-SCNC: 7 MMOL/L (ref 0–18)
APPEARANCE: CLEAR
AST SERPL-CCNC: 16 U/L (ref 15–37)
BASOPHILS # BLD AUTO: 0.02 X10(3) UL (ref 0–0.2)
BASOPHILS NFR BLD AUTO: 0.4 %
BILIRUB SERPL-MCNC: 1.6 MG/DL (ref 0.1–2)
BILIRUBIN: NEGATIVE
BUN BLD-MCNC: 15 MG/DL (ref 7–18)
CALCIUM BLD-MCNC: 9.5 MG/DL (ref 8.5–10.1)
CHLORIDE SERPL-SCNC: 101 MMOL/L (ref 98–112)
CO2 SERPL-SCNC: 29 MMOL/L (ref 21–32)
COMPLEXED PSA SERPL-MCNC: 0.7 NG/ML (ref ?–4)
CREAT BLD-MCNC: 1.2 MG/DL
EOSINOPHIL # BLD AUTO: 0.12 X10(3) UL (ref 0–0.7)
EOSINOPHIL NFR BLD AUTO: 2.2 %
ERYTHROCYTE [DISTWIDTH] IN BLOOD BY AUTOMATED COUNT: 13.7 %
FASTING STATUS PATIENT QL REPORTED: YES
GFR SERPLBLD BASED ON 1.73 SQ M-ARVRAT: 60 ML/MIN/1.73M2 (ref 60–?)
GLOBULIN PLAS-MCNC: 3.9 G/DL (ref 2.8–4.4)
GLUCOSE (URINE DIPSTICK): NEGATIVE MG/DL
GLUCOSE BLD-MCNC: 109 MG/DL (ref 70–99)
HCT VFR BLD AUTO: 42.5 %
HGB BLD-MCNC: 13.9 G/DL
IMM GRANULOCYTES # BLD AUTO: 0.01 X10(3) UL (ref 0–1)
IMM GRANULOCYTES NFR BLD: 0.2 %
KETONES (URINE DIPSTICK): NEGATIVE MG/DL
LEUKOCYTES: NEGATIVE
LYMPHOCYTES # BLD AUTO: 1.23 X10(3) UL (ref 1–4)
LYMPHOCYTES NFR BLD AUTO: 22.1 %
MAGNESIUM SERPL-MCNC: 2.2 MG/DL (ref 1.6–2.6)
MCH RBC QN AUTO: 32 PG (ref 26–34)
MCHC RBC AUTO-ENTMCNC: 32.7 G/DL (ref 31–37)
MCV RBC AUTO: 97.7 FL
MONOCYTES # BLD AUTO: 0.4 X10(3) UL (ref 0.1–1)
MONOCYTES NFR BLD AUTO: 7.2 %
MULTISTIX EXPIRATION DATE: NORMAL DATE
MULTISTIX LOT#: NORMAL NUMERIC
NEUTROPHILS # BLD AUTO: 3.78 X10 (3) UL (ref 1.5–7.7)
NEUTROPHILS # BLD AUTO: 3.78 X10(3) UL (ref 1.5–7.7)
NEUTROPHILS NFR BLD AUTO: 67.9 %
NITRITE, URINE: NEGATIVE
OCCULT BLOOD: NEGATIVE
OSMOLALITY SERPL CALC.SUM OF ELEC: 285 MOSM/KG (ref 275–295)
PH, URINE: 6.5 (ref 4.5–8)
PHOSPHATE SERPL-MCNC: 3.5 MG/DL (ref 2.5–4.9)
PLATELET # BLD AUTO: 176 10(3)UL (ref 150–450)
POTASSIUM SERPL-SCNC: 4 MMOL/L (ref 3.5–5.1)
PROT SERPL-MCNC: 7.5 G/DL (ref 6.4–8.2)
PROTEIN (URINE DIPSTICK): NEGATIVE MG/DL
RBC # BLD AUTO: 4.35 X10(6)UL
SODIUM SERPL-SCNC: 137 MMOL/L (ref 136–145)
SPECIFIC GRAVITY: 1.01 (ref 1–1.03)
TSI SER-ACNC: 0.39 MIU/ML (ref 0.36–3.74)
URINE-COLOR: YELLOW
UROBILINOGEN,SEMI-QN: 0.2 MG/DL (ref 0–1.9)
VIT B12 SERPL-MCNC: 808 PG/ML (ref 193–986)
VIT D+METAB SERPL-MCNC: 72.6 NG/ML (ref 30–100)
WBC # BLD AUTO: 5.6 X10(3) UL (ref 4–11)

## 2022-10-20 PROCEDURE — 36415 COLL VENOUS BLD VENIPUNCTURE: CPT

## 2022-10-20 PROCEDURE — 83735 ASSAY OF MAGNESIUM: CPT

## 2022-10-20 PROCEDURE — 81003 URINALYSIS AUTO W/O SCOPE: CPT | Performed by: FAMILY MEDICINE

## 2022-10-20 PROCEDURE — 99214 OFFICE O/P EST MOD 30 MIN: CPT | Performed by: FAMILY MEDICINE

## 2022-10-20 PROCEDURE — 84443 ASSAY THYROID STIM HORMONE: CPT

## 2022-10-20 PROCEDURE — 85025 COMPLETE CBC W/AUTO DIFF WBC: CPT

## 2022-10-20 PROCEDURE — 84100 ASSAY OF PHOSPHORUS: CPT

## 2022-10-20 PROCEDURE — 82607 VITAMIN B-12: CPT

## 2022-10-20 PROCEDURE — 84425 ASSAY OF VITAMIN B-1: CPT

## 2022-10-20 PROCEDURE — 80053 COMPREHEN METABOLIC PANEL: CPT

## 2022-10-20 PROCEDURE — 82306 VITAMIN D 25 HYDROXY: CPT

## 2022-10-20 RX ORDER — LEVOTHYROXINE SODIUM 0.1 MG/1
100 TABLET ORAL DAILY
Qty: 30 TABLET | Refills: 5 | Status: SHIPPED | OUTPATIENT
Start: 2022-10-20

## 2022-10-20 RX ORDER — ATORVASTATIN CALCIUM 10 MG/1
10 TABLET, FILM COATED ORAL NIGHTLY
Qty: 90 TABLET | Refills: 3 | Status: SHIPPED | OUTPATIENT
Start: 2022-10-20

## 2022-10-20 NOTE — PATIENT INSTRUCTIONS
Carotid ultrasound to assess for carotid artery stenosis for the fainting spells. Blood work to assess for anemia which could cause the symptoms in the hands and feet as well as the fainting    Stay on levothyroxine 100 mcg. We have increased the dose again from 88 mcg. Urinalysis normal.    Hold the constipation supplement for a few days and see if the abdominal pain improves.     If labs okay consider scanning the abdomen and pelvis

## 2022-10-24 ENCOUNTER — PATIENT MESSAGE (OUTPATIENT)
Dept: FAMILY MEDICINE CLINIC | Facility: CLINIC | Age: 82
End: 2022-10-24

## 2022-10-24 DIAGNOSIS — R10.84 GENERALIZED ABDOMINAL PAIN: Primary | ICD-10-CM

## 2022-10-24 NOTE — TELEPHONE ENCOUNTER
From: Jean Paul Zuleta  To: Kassie Mckenna MD  Sent: 10/24/2022 11:54 AM CDT  Subject: Results of elimination of Miralax    Dr. Nandini Paz,  We eliminated the use of Miralax for the past 5 days as you suggested. No change in stomach or groin pain. Elena Vizcarra was able to have small hard bowel movements during this time.    Fabienne Zuleta

## 2022-10-26 LAB — VITAMIN B1 (THIAMINE), WHOLE B: 116 NMOL/L

## 2022-11-13 ENCOUNTER — PATIENT MESSAGE (OUTPATIENT)
Dept: FAMILY MEDICINE CLINIC | Facility: CLINIC | Age: 82
End: 2022-11-13

## 2022-11-13 DIAGNOSIS — F06.8 ANXIETY DISORDER DUE TO MULTIPLE MEDICAL PROBLEMS: ICD-10-CM

## 2022-11-13 DIAGNOSIS — F51.01 PRIMARY INSOMNIA: ICD-10-CM

## 2022-11-14 RX ORDER — CLONAZEPAM 2 MG/1
2 TABLET ORAL NIGHTLY
Qty: 30 TABLET | Refills: 0 | Status: SHIPPED | OUTPATIENT
Start: 2022-11-14

## 2022-11-14 NOTE — TELEPHONE ENCOUNTER
From: Devin Zuleta  To: Leland Atwood MD  Sent: 11/13/2022 2:04 PM CST  Subject: Prescription      Forrest ,  Can I get a prescription for Clonazepam (2MG) with refills to help me sleep?   Thank You,  Claris Bence

## 2022-12-14 ENCOUNTER — PATIENT MESSAGE (OUTPATIENT)
Dept: FAMILY MEDICINE CLINIC | Facility: CLINIC | Age: 82
End: 2022-12-14

## 2022-12-14 DIAGNOSIS — F51.01 PRIMARY INSOMNIA: ICD-10-CM

## 2022-12-14 DIAGNOSIS — F06.8 ANXIETY DISORDER DUE TO MULTIPLE MEDICAL PROBLEMS: ICD-10-CM

## 2022-12-14 DIAGNOSIS — D51.0 PERNICIOUS ANEMIA: ICD-10-CM

## 2022-12-14 RX ORDER — CYANOCOBALAMIN 1000 UG/ML
1000 INJECTION, SOLUTION INTRAMUSCULAR; SUBCUTANEOUS
Qty: 3 ML | Refills: 9 | Status: SHIPPED | OUTPATIENT
Start: 2022-12-14

## 2022-12-14 RX ORDER — CLONAZEPAM 2 MG/1
2 TABLET ORAL NIGHTLY
Qty: 30 TABLET | Refills: 0 | Status: SHIPPED | OUTPATIENT
Start: 2022-12-14

## 2022-12-14 NOTE — TELEPHONE ENCOUNTER
Please see patient message   LV:clonazepam 11/4/2022  B12 4/6/2021    Component   Ref Range & Units 10/20/22 12:22 PM    Vitamin B12   193 - 986 pg/mL 808

## 2022-12-14 NOTE — TELEPHONE ENCOUNTER
From: Fausto Zuleta  To: Guillaume Bermeo MD  Sent: 12/14/2022 9:24 AM CST  Subject: Medication    Dr. Brenda Hernandez,  Can I get a prescription for Clonazepam (2MG) with refills to help me sleep? Also, I need a prescription for Cyanocobalamin, 1,000 MCG/ML with refills.    Thank You,  Jesse Ham

## 2023-01-11 ENCOUNTER — PATIENT MESSAGE (OUTPATIENT)
Dept: FAMILY MEDICINE CLINIC | Facility: CLINIC | Age: 83
End: 2023-01-11

## 2023-01-11 RX ORDER — PANTOPRAZOLE SODIUM 40 MG/1
40 TABLET, DELAYED RELEASE ORAL
Qty: 90 TABLET | Refills: 1 | Status: SHIPPED | OUTPATIENT
Start: 2023-01-11

## 2023-01-11 NOTE — TELEPHONE ENCOUNTER
From: Esha Zuleta  To: Darian Kaye MD  Sent: 1/11/2023 12:47 PM CST  Subject: Pantoprazole    Dr Inez Domínguez,  Can I get a prescription for Pantoprazole (40 MG) with refills to help with my stomach issues.    Thank You,  Jamia Serna

## 2023-01-11 NOTE — TELEPHONE ENCOUNTER
Pt requesting script for Pantoprazole  This was last filled 7/7/22 by a different provider.   Approve/deny   Last OV 10/20/22

## 2023-01-13 ENCOUNTER — PATIENT MESSAGE (OUTPATIENT)
Dept: FAMILY MEDICINE CLINIC | Facility: CLINIC | Age: 83
End: 2023-01-13

## 2023-01-13 DIAGNOSIS — F06.8 ANXIETY DISORDER DUE TO MULTIPLE MEDICAL PROBLEMS: ICD-10-CM

## 2023-01-13 DIAGNOSIS — F51.01 PRIMARY INSOMNIA: ICD-10-CM

## 2023-01-13 RX ORDER — CLONAZEPAM 2 MG/1
2 TABLET ORAL NIGHTLY
Qty: 30 TABLET | Refills: 0 | Status: SHIPPED | OUTPATIENT
Start: 2023-01-13

## 2023-01-13 NOTE — TELEPHONE ENCOUNTER
RC-  Please see refill message    Last OV 10/20/2022  Last refill 12/14/2022    Medication pended for review

## 2023-01-13 NOTE — TELEPHONE ENCOUNTER
From: Tonja Zuleta  To: Scottie Bowman MD  Sent: 1/13/2023 9:19 AM CST  Subject: Clonazepam    Dr. Lenny Ferrara,  Can I get a prescription for Clonazepam (2 MG) with refills to help me sleep. Thank you.    Yolanda Brand

## 2023-02-10 ENCOUNTER — PATIENT MESSAGE (OUTPATIENT)
Dept: FAMILY MEDICINE CLINIC | Facility: CLINIC | Age: 83
End: 2023-02-10

## 2023-02-10 DIAGNOSIS — F51.01 PRIMARY INSOMNIA: ICD-10-CM

## 2023-02-10 DIAGNOSIS — F06.8 ANXIETY DISORDER DUE TO MULTIPLE MEDICAL PROBLEMS: ICD-10-CM

## 2023-02-10 RX ORDER — CLONAZEPAM 2 MG/1
2 TABLET ORAL NIGHTLY
Qty: 30 TABLET | Refills: 0 | Status: SHIPPED | OUTPATIENT
Start: 2023-02-10

## 2023-02-10 NOTE — TELEPHONE ENCOUNTER
From: Devin Zuleta  To: Leland Atwood MD  Sent: 2/10/2023 3:06 PM CST  Subject: Clonazepam     [facility],  Can I get a prescription for Clonazepam (2MG) with refills to help me sleep.   Thank You,  Claris Bence

## 2023-03-13 ENCOUNTER — PATIENT MESSAGE (OUTPATIENT)
Dept: FAMILY MEDICINE CLINIC | Facility: CLINIC | Age: 83
End: 2023-03-13

## 2023-03-13 DIAGNOSIS — F51.01 PRIMARY INSOMNIA: ICD-10-CM

## 2023-03-13 DIAGNOSIS — F06.8 ANXIETY DISORDER DUE TO MULTIPLE MEDICAL PROBLEMS: ICD-10-CM

## 2023-03-13 RX ORDER — CLONAZEPAM 2 MG/1
2 TABLET ORAL NIGHTLY
Qty: 30 TABLET | Refills: 0 | Status: SHIPPED | OUTPATIENT
Start: 2023-03-13

## 2023-03-13 NOTE — TELEPHONE ENCOUNTER
From: Norah Zuleta  To: Jeremy Jeans, MD  Sent: 3/13/2023 10:03 AM CDT  Subject: Clonazepam    Dr. Hilda Wheatley,  Can I get a prescription for Clonazepam (2MG) with refills to help me sleep?   Thank You,  Cristofer Spain

## 2023-04-10 ENCOUNTER — PATIENT MESSAGE (OUTPATIENT)
Dept: FAMILY MEDICINE CLINIC | Facility: CLINIC | Age: 83
End: 2023-04-10

## 2023-04-10 DIAGNOSIS — F51.01 PRIMARY INSOMNIA: ICD-10-CM

## 2023-04-10 DIAGNOSIS — F06.8 ANXIETY DISORDER DUE TO MULTIPLE MEDICAL PROBLEMS: ICD-10-CM

## 2023-04-10 RX ORDER — CLONAZEPAM 2 MG/1
2 TABLET ORAL NIGHTLY
Qty: 30 TABLET | Refills: 0 | Status: SHIPPED | OUTPATIENT
Start: 2023-04-10

## 2023-04-10 RX ORDER — LEVOTHYROXINE SODIUM 0.1 MG/1
TABLET ORAL
Qty: 90 TABLET | Refills: 1 | Status: SHIPPED | OUTPATIENT
Start: 2023-04-10

## 2023-04-10 NOTE — TELEPHONE ENCOUNTER
From: Christophe Zuleta  To: Shaina Nash MD  Sent: 4/10/2023 1:49 PM CDT  Subject: Clonazepam Refill    Dr. Cris Orellana,  Can I get a prescription for Clonazepam (2 MG) with refills to help me sleep.    Thank You,  Desi Sommers

## 2023-05-08 ENCOUNTER — APPOINTMENT (OUTPATIENT)
Dept: CT IMAGING | Facility: HOSPITAL | Age: 83
End: 2023-05-08
Attending: INTERNAL MEDICINE
Payer: MEDICARE

## 2023-05-08 ENCOUNTER — APPOINTMENT (OUTPATIENT)
Dept: GENERAL RADIOLOGY | Facility: HOSPITAL | Age: 83
End: 2023-05-08
Attending: EMERGENCY MEDICINE
Payer: MEDICARE

## 2023-05-08 ENCOUNTER — HOSPITAL ENCOUNTER (INPATIENT)
Facility: HOSPITAL | Age: 83
LOS: 2 days | Discharge: NURSING, REHAB OR LONG-TERM CARE | End: 2023-05-10
Attending: EMERGENCY MEDICINE | Admitting: INTERNAL MEDICINE
Payer: MEDICARE

## 2023-05-08 DIAGNOSIS — J18.9 COMMUNITY ACQUIRED PNEUMONIA OF RIGHT MIDDLE LOBE OF LUNG: Primary | ICD-10-CM

## 2023-05-08 DIAGNOSIS — R53.1 WEAKNESS: ICD-10-CM

## 2023-05-08 DIAGNOSIS — U07.1 COVID-19: ICD-10-CM

## 2023-05-08 LAB
ALBUMIN SERPL-MCNC: 2.9 G/DL (ref 3.4–5)
ALBUMIN/GLOB SERPL: 0.8 {RATIO} (ref 1–2)
ALP LIVER SERPL-CCNC: 52 U/L
ALT SERPL-CCNC: 21 U/L
ANION GAP SERPL CALC-SCNC: 4 MMOL/L (ref 0–18)
AST SERPL-CCNC: 34 U/L (ref 15–37)
ATRIAL RATE: 78 BPM
BASOPHILS # BLD AUTO: 0.01 X10(3) UL (ref 0–0.2)
BASOPHILS NFR BLD AUTO: 0.2 %
BILIRUB SERPL-MCNC: 0.8 MG/DL (ref 0.1–2)
BILIRUB UR QL STRIP.AUTO: NEGATIVE
BUN BLD-MCNC: 20 MG/DL (ref 7–18)
CALCIUM BLD-MCNC: 8.7 MG/DL (ref 8.5–10.1)
CHLORIDE SERPL-SCNC: 100 MMOL/L (ref 98–112)
CK SERPL-CCNC: 95 U/L
CLARITY UR REFRACT.AUTO: CLEAR
CO2 SERPL-SCNC: 29 MMOL/L (ref 21–32)
COLOR UR AUTO: YELLOW
CREAT BLD-MCNC: 1.05 MG/DL
D DIMER PPP FEU-MCNC: 2.68 UG/ML FEU (ref ?–0.85)
EOSINOPHIL # BLD AUTO: 0.01 X10(3) UL (ref 0–0.7)
EOSINOPHIL NFR BLD AUTO: 0.2 %
ERYTHROCYTE [DISTWIDTH] IN BLOOD BY AUTOMATED COUNT: 13.3 %
GFR SERPLBLD BASED ON 1.73 SQ M-ARVRAT: 70 ML/MIN/1.73M2 (ref 60–?)
GLOBULIN PLAS-MCNC: 3.8 G/DL (ref 2.8–4.4)
GLUCOSE BLD-MCNC: 102 MG/DL (ref 70–99)
GLUCOSE UR STRIP.AUTO-MCNC: NEGATIVE MG/DL
HCT VFR BLD AUTO: 37.9 %
HGB BLD-MCNC: 12.7 G/DL
IMM GRANULOCYTES # BLD AUTO: 0.02 X10(3) UL (ref 0–1)
IMM GRANULOCYTES NFR BLD: 0.4 %
LACTATE SERPL-SCNC: 1.6 MMOL/L (ref 0.4–2)
LEUKOCYTE ESTERASE UR QL STRIP.AUTO: NEGATIVE
LYMPHOCYTES # BLD AUTO: 0.75 X10(3) UL (ref 1–4)
LYMPHOCYTES NFR BLD AUTO: 14.3 %
MCH RBC QN AUTO: 31.3 PG (ref 26–34)
MCHC RBC AUTO-ENTMCNC: 33.5 G/DL (ref 31–37)
MCV RBC AUTO: 93.3 FL
MONOCYTES # BLD AUTO: 0.42 X10(3) UL (ref 0.1–1)
MONOCYTES NFR BLD AUTO: 8 %
NEUTROPHILS # BLD AUTO: 4.04 X10 (3) UL (ref 1.5–7.7)
NEUTROPHILS # BLD AUTO: 4.04 X10(3) UL (ref 1.5–7.7)
NEUTROPHILS NFR BLD AUTO: 76.9 %
NITRITE UR QL STRIP.AUTO: NEGATIVE
NT-PROBNP SERPL-MCNC: 706 PG/ML (ref ?–450)
OSMOLALITY SERPL CALC.SUM OF ELEC: 279 MOSM/KG (ref 275–295)
P AXIS: 81 DEGREES
P-R INTERVAL: 140 MS
PH UR STRIP.AUTO: 8 [PH] (ref 5–8)
PLATELET # BLD AUTO: 106 10(3)UL (ref 150–450)
POTASSIUM SERPL-SCNC: 3.9 MMOL/L (ref 3.5–5.1)
PROCALCITONIN SERPL-MCNC: <0.05 NG/ML (ref ?–0.16)
PROT SERPL-MCNC: 6.7 G/DL (ref 6.4–8.2)
PROT UR STRIP.AUTO-MCNC: 30 MG/DL
Q-T INTERVAL: 348 MS
QRS DURATION: 84 MS
QTC CALCULATION (BEZET): 396 MS
R AXIS: -40 DEGREES
RBC # BLD AUTO: 4.06 X10(6)UL
RBC UR QL AUTO: NEGATIVE
SARS-COV-2 RNA RESP QL NAA+PROBE: DETECTED
SODIUM SERPL-SCNC: 133 MMOL/L (ref 136–145)
SP GR UR STRIP.AUTO: 1.02 (ref 1–1.03)
T AXIS: 69 DEGREES
UROBILINOGEN UR STRIP.AUTO-MCNC: <2 MG/DL
VENTRICULAR RATE: 78 BPM
WBC # BLD AUTO: 5.3 X10(3) UL (ref 4–11)

## 2023-05-08 PROCEDURE — 71045 X-RAY EXAM CHEST 1 VIEW: CPT | Performed by: EMERGENCY MEDICINE

## 2023-05-08 PROCEDURE — XW033E5 INTRODUCTION OF REMDESIVIR ANTI-INFECTIVE INTO PERIPHERAL VEIN, PERCUTANEOUS APPROACH, NEW TECHNOLOGY GROUP 5: ICD-10-PCS | Performed by: INTERNAL MEDICINE

## 2023-05-08 PROCEDURE — 99223 1ST HOSP IP/OBS HIGH 75: CPT | Performed by: INTERNAL MEDICINE

## 2023-05-08 PROCEDURE — 71275 CT ANGIOGRAPHY CHEST: CPT | Performed by: INTERNAL MEDICINE

## 2023-05-08 RX ORDER — FENTANYL 25 UG/H
1 PATCH TRANSDERMAL
Status: DISCONTINUED | OUTPATIENT
Start: 2023-05-08 | End: 2023-05-10

## 2023-05-08 RX ORDER — PANTOPRAZOLE SODIUM 40 MG/1
40 TABLET, DELAYED RELEASE ORAL
Status: DISCONTINUED | OUTPATIENT
Start: 2023-05-09 | End: 2023-05-10

## 2023-05-08 RX ORDER — FENTANYL 25 UG/H
1 PATCH TRANSDERMAL
COMMUNITY
Start: 2023-04-07

## 2023-05-08 RX ORDER — LEVOTHYROXINE SODIUM 0.1 MG/1
100 TABLET ORAL
Status: DISCONTINUED | OUTPATIENT
Start: 2023-05-09 | End: 2023-05-10

## 2023-05-08 RX ORDER — GUAIFENESIN 600 MG/1
600 TABLET, EXTENDED RELEASE ORAL 2 TIMES DAILY
Status: DISCONTINUED | OUTPATIENT
Start: 2023-05-08 | End: 2023-05-10

## 2023-05-08 RX ORDER — ALBUTEROL SULFATE 90 UG/1
4 AEROSOL, METERED RESPIRATORY (INHALATION) EVERY 4 HOURS PRN
Status: DISCONTINUED | OUTPATIENT
Start: 2023-05-08 | End: 2023-05-10

## 2023-05-08 RX ORDER — ACETAMINOPHEN 500 MG
500 TABLET ORAL EVERY 4 HOURS PRN
Status: DISCONTINUED | OUTPATIENT
Start: 2023-05-08 | End: 2023-05-10

## 2023-05-08 RX ORDER — SODIUM CHLORIDE 9 MG/ML
125 INJECTION, SOLUTION INTRAVENOUS CONTINUOUS
Status: DISCONTINUED | OUTPATIENT
Start: 2023-05-08 | End: 2023-05-10

## 2023-05-08 RX ORDER — ATORVASTATIN CALCIUM 10 MG/1
10 TABLET, FILM COATED ORAL NIGHTLY
Status: DISCONTINUED | OUTPATIENT
Start: 2023-05-08 | End: 2023-05-10

## 2023-05-08 RX ORDER — ONDANSETRON 2 MG/ML
4 INJECTION INTRAMUSCULAR; INTRAVENOUS EVERY 6 HOURS PRN
Status: DISCONTINUED | OUTPATIENT
Start: 2023-05-08 | End: 2023-05-10

## 2023-05-08 RX ORDER — ENOXAPARIN SODIUM 100 MG/ML
40 INJECTION SUBCUTANEOUS NIGHTLY
Status: DISCONTINUED | OUTPATIENT
Start: 2023-05-08 | End: 2023-05-10

## 2023-05-08 RX ORDER — MELATONIN
3 NIGHTLY PRN
Status: DISCONTINUED | OUTPATIENT
Start: 2023-05-08 | End: 2023-05-08 | Stop reason: SDUPTHER

## 2023-05-08 RX ORDER — ACETAMINOPHEN 500 MG
1000 TABLET ORAL ONCE
Status: COMPLETED | OUTPATIENT
Start: 2023-05-08 | End: 2023-05-08

## 2023-05-08 RX ORDER — AZITHROMYCIN 250 MG/1
500 TABLET, FILM COATED ORAL ONCE
Status: COMPLETED | OUTPATIENT
Start: 2023-05-08 | End: 2023-05-08

## 2023-05-08 RX ORDER — CLONAZEPAM 1 MG/1
2 TABLET ORAL NIGHTLY
Status: DISCONTINUED | OUTPATIENT
Start: 2023-05-08 | End: 2023-05-10

## 2023-05-08 RX ORDER — FENTANYL 12 UG/H
1 PATCH TRANSDERMAL
Status: ON HOLD | COMMUNITY
Start: 2023-04-07 | End: 2023-05-08

## 2023-05-08 NOTE — PROGRESS NOTES
NURSING ADMISSION NOTE      Patient admitted via Cart  Oriented to room 525. Safety precautions initiated. Bed in low position. Call light in reach. Received pt a&ox3. O2 WNL on RA. NSR on tele. ID at bedside. Admission navigator completed with patient and confirmed with spouse via telephone. Updated on POC and no further questions at this time.

## 2023-05-08 NOTE — PROGRESS NOTES
[facility]acy Note: Route Optimization for Azithromycin [facility])    Patient is currently on Azithromycin (ZITHROMAX) 500 mg IV x1. The patient meets the criteria to convert to the oral equivalent as established by the IV to Oral conversion protocol approved by the P&T committee. Medication was changed from IV formulation to Azithromycin (ZITHROMAX)  500 mg PO x1 per protocol.       Crow Soto, PharmD  5/8/2023,  3:50 PM

## 2023-05-08 NOTE — ED INITIAL ASSESSMENT (HPI)
Patient from home for increased weakness for one week. Recent COVID, productive cough and fever. C/O chronic back pain.

## 2023-05-09 LAB
ALBUMIN SERPL-MCNC: 2.4 G/DL (ref 3.4–5)
ALBUMIN/GLOB SERPL: 0.7 {RATIO} (ref 1–2)
ALP LIVER SERPL-CCNC: 46 U/L
ALT SERPL-CCNC: 18 U/L
ANION GAP SERPL CALC-SCNC: 2 MMOL/L (ref 0–18)
AST SERPL-CCNC: 25 U/L (ref 15–37)
BASOPHILS # BLD AUTO: 0.02 X10(3) UL (ref 0–0.2)
BASOPHILS NFR BLD AUTO: 0.4 %
BILIRUB SERPL-MCNC: 0.5 MG/DL (ref 0.1–2)
BUN BLD-MCNC: 17 MG/DL (ref 7–18)
CALCIUM BLD-MCNC: 7.8 MG/DL (ref 8.5–10.1)
CHLORIDE SERPL-SCNC: 105 MMOL/L (ref 98–112)
CO2 SERPL-SCNC: 26 MMOL/L (ref 21–32)
CREAT BLD-MCNC: 0.94 MG/DL
CRP SERPL-MCNC: 9.2 MG/DL (ref ?–0.3)
D DIMER PPP FEU-MCNC: 1.48 UG/ML FEU (ref ?–0.85)
DEPRECATED HBV CORE AB SER IA-ACNC: 224.7 NG/ML
EOSINOPHIL # BLD AUTO: 0.04 X10(3) UL (ref 0–0.7)
EOSINOPHIL NFR BLD AUTO: 0.9 %
ERYTHROCYTE [DISTWIDTH] IN BLOOD BY AUTOMATED COUNT: 13.3 %
GFR SERPLBLD BASED ON 1.73 SQ M-ARVRAT: 79 ML/MIN/1.73M2 (ref 60–?)
GLOBULIN PLAS-MCNC: 3.3 G/DL (ref 2.8–4.4)
GLUCOSE BLD-MCNC: 80 MG/DL (ref 70–99)
HCT VFR BLD AUTO: 36.2 %
HGB BLD-MCNC: 11.9 G/DL
IMM GRANULOCYTES # BLD AUTO: 0.01 X10(3) UL (ref 0–1)
IMM GRANULOCYTES NFR BLD: 0.2 %
L PNEUMO AG UR QL: NEGATIVE
LDH SERPL L TO P-CCNC: 154 U/L
LYMPHOCYTES # BLD AUTO: 1.16 X10(3) UL (ref 1–4)
LYMPHOCYTES NFR BLD AUTO: 25.8 %
MCH RBC QN AUTO: 31.9 PG (ref 26–34)
MCHC RBC AUTO-ENTMCNC: 32.9 G/DL (ref 31–37)
MCV RBC AUTO: 97.1 FL
MONOCYTES # BLD AUTO: 0.58 X10(3) UL (ref 0.1–1)
MONOCYTES NFR BLD AUTO: 12.9 %
NEUTROPHILS # BLD AUTO: 2.68 X10 (3) UL (ref 1.5–7.7)
NEUTROPHILS # BLD AUTO: 2.68 X10(3) UL (ref 1.5–7.7)
NEUTROPHILS NFR BLD AUTO: 59.8 %
OSMOLALITY SERPL CALC.SUM OF ELEC: 277 MOSM/KG (ref 275–295)
PLATELET # BLD AUTO: 116 10(3)UL (ref 150–450)
POTASSIUM SERPL-SCNC: 3.8 MMOL/L (ref 3.5–5.1)
PROT SERPL-MCNC: 5.7 G/DL (ref 6.4–8.2)
RBC # BLD AUTO: 3.73 X10(6)UL
SODIUM SERPL-SCNC: 133 MMOL/L (ref 136–145)
STREP PNEUMO ANTIGEN, URINE: NEGATIVE
WBC # BLD AUTO: 4.5 X10(3) UL (ref 4–11)

## 2023-05-09 PROCEDURE — 99232 SBSQ HOSP IP/OBS MODERATE 35: CPT | Performed by: HOSPITALIST

## 2023-05-09 RX ORDER — POTASSIUM CHLORIDE 1.5 G/1.77G
40 POWDER, FOR SOLUTION ORAL ONCE
Status: COMPLETED | OUTPATIENT
Start: 2023-05-09 | End: 2023-05-09

## 2023-05-09 NOTE — PROGRESS NOTES
Received pt at 0700, a&ox4. O2 WNL on RA. IS at bedside. NSR on tele. Continent x2. Up SB w walker BRP. Ambulated in room with PT. R upper back fentanyl patch. Day #2 remdesivir. Tolerating regular diet. Updated on POC, no further questions at this time. Problem: Patient/Family Goals  Goal: Patient/Family Long Term Goal  Description: Patient's Long Term Goal: To discharge home with adequate resources    Interventions:  - Comply with plan of care  - See additional Care Plan goals for specific interventions  Outcome: Progressing  Goal: Patient/Family Short Term Goal  Description: Patient's Short Term Goal:   5/8 NOC: Pain control, sleep  5/9am: IV abx and remdesivir     Interventions:   - Fentanyl patch, PRN pain meds, cluster care  - See additional Care Plan goals for specific interventions  Outcome: Progressing     Problem: SAFETY ADULT - FALL  Goal: Free from fall injury  Description: INTERVENTIONS:  - Assess pt frequently for physical needs  - Identify cognitive and physical deficits and behaviors that affect risk of falls.   - [place] fall precautions as indicated by assessment.  - Educate pt/family on patient safety including physical limitations  - Instruct pt to call for assistance with activity based on assessment  - Modify environment to reduce risk of injury  - Provide assistive devices as appropriate  - Consider OT/PT consult to assist with strengthening/mobility  - Encourage toileting schedule  Outcome: Progressing     Problem: Impaired Functional Mobility  Goal: Achieve highest/safest level of mobility/gait  Description: Interventions:  - Assess patient's functional ability and stability  - Promote increasing activity/tolerance for mobility and gait  - Educate and engage patient/family in tolerated activity level and precautions  - Recommend use of  RW for transfers and ambulation  - Recommend use of chair position in bed 3 times per day  Outcome: Progressing

## 2023-05-09 NOTE — CM/SW NOTE
Department  notified of request for jyoti HALLMAN referrals started. Assigned CM/SW to follow up with pt/family on further discharge planning.      Carlos Weber   May 09, 2023   15:59

## 2023-05-09 NOTE — PLAN OF CARE
Pt A&OX3/4. On RA sating WNL. . On tele. NSR/SB. C/o generalized pain. See MAR. Fentanyl patch to R back. Continent. Urinal at bedside. CTA completed. See Results. Pt updated on plan of care and verbalized understanding. Call light within reach. All needs met at this time. Safety precautions in place. Will follow. Problem: Patient/Family Goals  Goal: Patient/Family Long Term Goal  Description: Patient's Long Term Goal: To discharge home with adequate resources    Interventions:  - Comply with plan of care  - See additional Care Plan goals for specific interventions  Outcome: Progressing  Goal: Patient/Family Short Term Goal  Description: Patient's Short Term Goal:   5/8 NOC: Pain control, sleep    Interventions:   - Fentanyl patch, PRN pain meds, cluster care  - See additional Care Plan goals for specific interventions  Outcome: Progressing     Problem: SAFETY ADULT - FALL  Goal: Free from fall injury  Description: INTERVENTIONS:  - Assess pt frequently for physical needs  - Identify cognitive and physical deficits and behaviors that affect risk of falls.   - [place] fall precautions as indicated by assessment.  - Educate pt/family on patient safety including physical limitations  - Instruct pt to call for assistance with activity based on assessment  - Modify environment to reduce risk of injury  - Provide assistive devices as appropriate  - Consider OT/PT consult to assist with strengthening/mobility  - Encourage toileting schedule  Outcome: Progressing     Problem: Impaired Functional Mobility  Goal: Achieve highest/safest level of mobility/gait  Description: Interventions:  - Assess patient's functional ability and stability  - Promote increasing activity/tolerance for mobility and gait  - Educate and engage patient/family in tolerated activity level and precautions  Outcome: Progressing

## 2023-05-09 NOTE — CM/SW NOTE
05/09/23 1400   CM/SW Referral Data   Referral Source Physician   Reason for Referral Discharge planning   Informant Patient   Medical Hx   Does patient have an established PCP? Yes   Significant Past Medical/Mental Health Hx HTN, HLD, back poblems, lumbar and cervical spine fusion, anxiety, anemia   Patient Info   Patient's Current Mental Status at Time of Assessment Alert   Patient's [address]   Number of Levels in Home 1   Number of Stair in Home 2   Patient lives with Spouse/Significant other   Patient Status Prior to Admission   Independent with ADLs and Mobility No   Pt. requires assistance with Housework;Driving;Meals; Bathing     Orders received for DC planning, Home Health eval. PT recommendation for HHC/ PT. Spoke to pt about HHC/Home PT. Pt declines home health. He reports \"I've done so much PT in the past. I'm bed bound, there is no point. \" With further discussion pt does report ambulating to the BR. He uses a rolling walker to ambulate. His wife assists him with all other ADLs. He denies need for additional support at home. Pt encouraged to inform staff if his wishes regarding HHC change, or if he has any questions/ concerns re: Discharge. CM/SW will remain available for DC planning and/or support.      LAURIE Liu, VIA [facility]    [id number]

## 2023-05-10 VITALS
SYSTOLIC BLOOD PRESSURE: 110 MMHG | TEMPERATURE: 97 F | HEART RATE: 72 BPM | DIASTOLIC BLOOD PRESSURE: 64 MMHG | OXYGEN SATURATION: 98 % | WEIGHT: 117 LBS | HEIGHT: 70 IN | BODY MASS INDEX: 16.75 KG/M2 | RESPIRATION RATE: 18 BRPM

## 2023-05-10 LAB
ALBUMIN SERPL-MCNC: 2.4 G/DL (ref 3.4–5)
ALBUMIN/GLOB SERPL: 0.8 {RATIO} (ref 1–2)
ALP LIVER SERPL-CCNC: 42 U/L
ALT SERPL-CCNC: 18 U/L
ANION GAP SERPL CALC-SCNC: 0 MMOL/L (ref 0–18)
AST SERPL-CCNC: 27 U/L (ref 15–37)
BASOPHILS # BLD AUTO: 0.01 X10(3) UL (ref 0–0.2)
BASOPHILS NFR BLD AUTO: 0.2 %
BILIRUB SERPL-MCNC: 0.4 MG/DL (ref 0.1–2)
BUN BLD-MCNC: 20 MG/DL (ref 7–18)
CALCIUM BLD-MCNC: 8 MG/DL (ref 8.5–10.1)
CHLORIDE SERPL-SCNC: 110 MMOL/L (ref 98–112)
CO2 SERPL-SCNC: 25 MMOL/L (ref 21–32)
CREAT BLD-MCNC: 0.89 MG/DL
CRP SERPL-MCNC: 7.18 MG/DL (ref ?–0.3)
D DIMER PPP FEU-MCNC: 1.04 UG/ML FEU (ref ?–0.85)
DEPRECATED HBV CORE AB SER IA-ACNC: 229.7 NG/ML
EOSINOPHIL # BLD AUTO: 0.17 X10(3) UL (ref 0–0.7)
EOSINOPHIL NFR BLD AUTO: 3.4 %
ERYTHROCYTE [DISTWIDTH] IN BLOOD BY AUTOMATED COUNT: 13.4 %
GFR SERPLBLD BASED ON 1.73 SQ M-ARVRAT: 84 ML/MIN/1.73M2 (ref 60–?)
GLOBULIN PLAS-MCNC: 3.1 G/DL (ref 2.8–4.4)
GLUCOSE BLD-MCNC: 78 MG/DL (ref 70–99)
HCT VFR BLD AUTO: 34.6 %
HGB BLD-MCNC: 11.7 G/DL
IMM GRANULOCYTES # BLD AUTO: 0.02 X10(3) UL (ref 0–1)
IMM GRANULOCYTES NFR BLD: 0.4 %
LDH SERPL L TO P-CCNC: 170 U/L
LYMPHOCYTES # BLD AUTO: 1.31 X10(3) UL (ref 1–4)
LYMPHOCYTES NFR BLD AUTO: 26.3 %
MCH RBC QN AUTO: 31.3 PG (ref 26–34)
MCHC RBC AUTO-ENTMCNC: 33.8 G/DL (ref 31–37)
MCV RBC AUTO: 92.5 FL
MONOCYTES # BLD AUTO: 0.59 X10(3) UL (ref 0.1–1)
MONOCYTES NFR BLD AUTO: 11.8 %
NEUTROPHILS # BLD AUTO: 2.88 X10 (3) UL (ref 1.5–7.7)
NEUTROPHILS # BLD AUTO: 2.88 X10(3) UL (ref 1.5–7.7)
NEUTROPHILS NFR BLD AUTO: 57.9 %
OSMOLALITY SERPL CALC.SUM OF ELEC: 281 MOSM/KG (ref 275–295)
PLATELET # BLD AUTO: 161 10(3)UL (ref 150–450)
POTASSIUM SERPL-SCNC: 4 MMOL/L (ref 3.5–5.1)
POTASSIUM SERPL-SCNC: 4 MMOL/L (ref 3.5–5.1)
PROT SERPL-MCNC: 5.5 G/DL (ref 6.4–8.2)
RBC # BLD AUTO: 3.74 X10(6)UL
SODIUM SERPL-SCNC: 135 MMOL/L (ref 136–145)
WBC # BLD AUTO: 5 X10(3) UL (ref 4–11)

## 2023-05-10 PROCEDURE — 99239 HOSP IP/OBS DSCHRG MGMT >30: CPT | Performed by: HOSPITALIST

## 2023-05-10 NOTE — PLAN OF CARE
Pt A&OX4. Forgetful at times. On RA sating WNL. . On tele. NSR. C/o generalized pain. See MAR. Fentanyl patch to R back. Continent. Urinal at bedside. Pt updated on plan of care and verbalized understanding. Call light within reach. All needs met at this time. Safety precautions in place. Will follow. Problem: Patient/Family Goals  Goal: Patient/Family Long Term Goal  Description: Patient's Long Term Goal: To discharge home with adequate resources    Interventions:  - Comply with plan of care  - See additional Care Plan goals for specific interventions  Outcome: Progressing  Goal: Patient/Family Short Term Goal  Description: Patient's Short Term Goal:   5/8 NOC: Pain control, sleep  5/9am: IV abx and remdesivir   5/9 NOC: Sleep, pain control     Interventions:   - Fentanyl patch, PRN pain meds, cluster care  - See additional Care Plan goals for specific interventions  Outcome: Progressing     Problem: SAFETY ADULT - FALL  Goal: Free from fall injury  Description: INTERVENTIONS:  - Assess pt frequently for physical needs  - Identify cognitive and physical deficits and behaviors that affect risk of falls.   - [place] fall precautions as indicated by assessment.  - Educate pt/family on patient safety including physical limitations  - Instruct pt to call for assistance with activity based on assessment  - Modify environment to reduce risk of injury  - Provide assistive devices as appropriate  - Consider OT/PT consult to assist with strengthening/mobility  - Encourage toileting schedule  Outcome: Progressing     Problem: Impaired Functional Mobility  Goal: Achieve highest/safest level of mobility/gait  Description: Interventions:  - Assess patient's functional ability and stability  - Promote increasing activity/tolerance for mobility and gait  - Educate and engage patient/family in tolerated activity level and precautions  Outcome: Progressing

## 2023-05-10 NOTE — CM/SW NOTE
05/10/23 1307   Discharge disposition   Expected discharge disposition [id number]   Discharge transportation [facility]     Notified by RN pt is medically cleared to discharge. Pt tested positive for COVID-19 on 5/8, per transport pt will need ambulance, cannot take [facility] until 5 days post positive test. [facility] arranged for 5pm. Updated RN, Jay Bui at [place], and pt's wife who is in agreement with discharge plan. PCS form completed and available for RN to print.       [facility]  [phone]    [facility]  [facility]  Discharge Planner

## 2023-05-10 NOTE — PROGRESS NOTES
NURSING DISCHARGE NOTE    Discharged Rehab facility via Ambulance. Accompanied by Support staff  Belongings Taken by patient/family. PIV removed. Discharge education completed with patient at bedside. Report called to [facility] and given to Delores jimenez. Wife aware of discharge planning. Updated on POC and no further questions at this time. Problem: Patient/Family Goals  Goal: Patient/Family Long Term Goal  Description: Patient's Long Term Goal: To discharge home with adequate resources    Interventions:  - Comply with plan of care  - See additional Care Plan goals for specific interventions  Outcome: Progressing  Goal: Patient/Family Short Term Goal  Description: Patient's Short Term Goal:   5/8 NOC: Pain control, sleep  5/9am: IV abx and remdesivir   5/9 NOC: Sleep, pain control   5/10am: last remdesivir dose     Interventions:   - Fentanyl patch, PRN pain meds, cluster care  - See additional Care Plan goals for specific interventions  Outcome: Progressing     Problem: SAFETY ADULT - FALL  Goal: Free from fall injury  Description: INTERVENTIONS:  - Assess pt frequently for physical needs  - Identify cognitive and physical deficits and behaviors that affect risk of falls.   - [place] fall precautions as indicated by assessment.  - Educate pt/family on patient safety including physical limitations  - Instruct pt to call for assistance with activity based on assessment  - Modify environment to reduce risk of injury  - Provide assistive devices as appropriate  - Consider OT/PT consult to assist with strengthening/mobility  - Encourage toileting schedule  Outcome: Progressing     Problem: Impaired Functional Mobility  Goal: Achieve highest/safest level of mobility/gait  Description: Interventions:  - Assess patient's functional ability and stability  - Promote increasing activity/tolerance for mobility and gait  - Educate and engage patient/family in tolerated activity level and precautions  - Recommend use of  RW for transfers and ambulation  Outcome: Progressing

## 2023-05-11 ENCOUNTER — INITIAL APN SNF VISIT (OUTPATIENT)
Dept: INTERNAL MEDICINE CLINIC | Age: 83
End: 2023-05-11

## 2023-05-11 VITALS
RESPIRATION RATE: 16 BRPM | DIASTOLIC BLOOD PRESSURE: 62 MMHG | OXYGEN SATURATION: 94 % | TEMPERATURE: 99 F | HEART RATE: 60 BPM | SYSTOLIC BLOOD PRESSURE: 123 MMHG

## 2023-05-11 DIAGNOSIS — K21.9 GASTROESOPHAGEAL REFLUX DISEASE WITHOUT ESOPHAGITIS: ICD-10-CM

## 2023-05-11 DIAGNOSIS — G89.4 CHRONIC PAIN SYNDROME: ICD-10-CM

## 2023-05-11 DIAGNOSIS — E03.9 HYPOTHYROIDISM, UNSPECIFIED TYPE: Chronic | ICD-10-CM

## 2023-05-11 DIAGNOSIS — R06.09 DYSPNEA ON EXERTION: ICD-10-CM

## 2023-05-11 DIAGNOSIS — J12.82 PNEUMONIA DUE TO COVID-19 VIRUS: Primary | ICD-10-CM

## 2023-05-11 DIAGNOSIS — R05.1 ACUTE COUGH: ICD-10-CM

## 2023-05-11 DIAGNOSIS — R63.0 POOR APPETITE: ICD-10-CM

## 2023-05-11 DIAGNOSIS — Z78.9 DECREASED ACTIVITIES OF DAILY LIVING (ADL): ICD-10-CM

## 2023-05-11 DIAGNOSIS — F41.9 ANXIETY: ICD-10-CM

## 2023-05-11 DIAGNOSIS — R42 DIZZINESS ON STANDING: ICD-10-CM

## 2023-05-11 DIAGNOSIS — I10 ESSENTIAL HYPERTENSION: ICD-10-CM

## 2023-05-11 DIAGNOSIS — U07.1 PNEUMONIA DUE TO COVID-19 VIRUS: Primary | ICD-10-CM

## 2023-05-11 DIAGNOSIS — R53.81 PHYSICAL DECONDITIONING: ICD-10-CM

## 2023-05-11 PROCEDURE — 1111F DSCHRG MED/CURRENT MED MERGE: CPT

## 2023-05-11 PROCEDURE — 99310 SBSQ NF CARE HIGH MDM 45: CPT

## 2023-05-11 RX ORDER — ONDANSETRON 4 MG/1
4 TABLET, FILM COATED ORAL EVERY 8 HOURS PRN
COMMUNITY

## 2023-05-11 RX ORDER — ZINC SULFATE 50(220)MG
220 CAPSULE ORAL DAILY
COMMUNITY
End: 2023-05-25

## 2023-05-11 RX ORDER — ALBUTEROL SULFATE 90 UG/1
2 AEROSOL, METERED RESPIRATORY (INHALATION) EVERY 6 HOURS PRN
COMMUNITY

## 2023-05-11 RX ORDER — GUAIFENESIN 600 MG/1
600 TABLET, EXTENDED RELEASE ORAL 2 TIMES DAILY
COMMUNITY
End: 2023-05-16

## 2023-05-11 RX ORDER — ASCORBIC ACID 500 MG
500 TABLET ORAL 2 TIMES DAILY
COMMUNITY
End: 2023-05-25

## 2023-05-15 ENCOUNTER — PATIENT MESSAGE (OUTPATIENT)
Dept: FAMILY MEDICINE CLINIC | Facility: CLINIC | Age: 83
End: 2023-05-15

## 2023-05-15 DIAGNOSIS — F51.01 PRIMARY INSOMNIA: ICD-10-CM

## 2023-05-15 DIAGNOSIS — F06.8 ANXIETY DISORDER DUE TO MULTIPLE MEDICAL PROBLEMS: ICD-10-CM

## 2023-05-15 RX ORDER — CLONAZEPAM 2 MG/1
2 TABLET ORAL NIGHTLY
Qty: 30 TABLET | Refills: 0 | Status: SHIPPED | OUTPATIENT
Start: 2023-05-15

## 2023-05-15 NOTE — TELEPHONE ENCOUNTER
From: Ana Paula Zuleta  To: Nora Soriano MD  Sent: 5/15/2023 3:54 PM CDT  Subject: Clonazepam 2MG    Dr. Marquis Tamayo  I would like to get a prescription for Clonazepam 2MG for Josh. He was in the hospital but is now at [facility]. He should be home this week and needs this medicine to help him sleep. Thank Kisha Zuleta.

## 2023-05-18 ENCOUNTER — SNF VISIT (OUTPATIENT)
Dept: INTERNAL MEDICINE CLINIC | Age: 83
End: 2023-05-18

## 2023-05-18 DIAGNOSIS — R19.5 HARD STOOL: ICD-10-CM

## 2023-05-18 DIAGNOSIS — R06.09 DYSPNEA ON EXERTION: ICD-10-CM

## 2023-05-18 DIAGNOSIS — E03.9 HYPOTHYROIDISM, UNSPECIFIED TYPE: ICD-10-CM

## 2023-05-18 DIAGNOSIS — G89.4 CHRONIC PAIN SYNDROME: ICD-10-CM

## 2023-05-18 DIAGNOSIS — I10 ESSENTIAL HYPERTENSION: ICD-10-CM

## 2023-05-18 DIAGNOSIS — R68.2 DRY MOUTH: ICD-10-CM

## 2023-05-18 DIAGNOSIS — R53.81 PHYSICAL DECONDITIONING: ICD-10-CM

## 2023-05-18 DIAGNOSIS — F41.9 ANXIETY: ICD-10-CM

## 2023-05-18 DIAGNOSIS — K21.9 GASTROESOPHAGEAL REFLUX DISEASE WITHOUT ESOPHAGITIS: ICD-10-CM

## 2023-05-18 DIAGNOSIS — J12.82 PNEUMONIA DUE TO COVID-19 VIRUS: Primary | ICD-10-CM

## 2023-05-18 DIAGNOSIS — U07.1 PNEUMONIA DUE TO COVID-19 VIRUS: Primary | ICD-10-CM

## 2023-05-18 DIAGNOSIS — R63.0 POOR APPETITE: ICD-10-CM

## 2023-05-18 DIAGNOSIS — Z78.9 DECREASED ACTIVITIES OF DAILY LIVING (ADL): ICD-10-CM

## 2023-05-18 PROCEDURE — 99309 SBSQ NF CARE MODERATE MDM 30: CPT

## 2023-05-18 PROCEDURE — 1111F DSCHRG MED/CURRENT MED MERGE: CPT

## 2023-05-19 VITALS
SYSTOLIC BLOOD PRESSURE: 118 MMHG | OXYGEN SATURATION: 97 % | DIASTOLIC BLOOD PRESSURE: 65 MMHG | TEMPERATURE: 98 F | RESPIRATION RATE: 18 BRPM | HEART RATE: 65 BPM

## 2023-05-19 RX ORDER — SALIVA STIMULANT COMB. NO.3
10 SPRAY, NON-AEROSOL (ML) MUCOUS MEMBRANE 3 TIMES DAILY
COMMUNITY

## 2023-05-19 RX ORDER — ENOXAPARIN SODIUM 100 MG/ML
40 INJECTION SUBCUTANEOUS DAILY
COMMUNITY
Start: 2023-05-12 | End: 2023-05-22

## 2023-05-19 RX ORDER — DOCUSATE SODIUM 100 MG/1
100 CAPSULE, LIQUID FILLED ORAL 2 TIMES DAILY
COMMUNITY

## 2023-05-19 RX ORDER — SENNOSIDES 8.6 MG
17.2 TABLET ORAL DAILY PRN
COMMUNITY

## 2023-05-23 ENCOUNTER — SNF DISCHARGE (OUTPATIENT)
Dept: INTERNAL MEDICINE CLINIC | Age: 83
End: 2023-05-23

## 2023-05-23 ENCOUNTER — PATIENT OUTREACH (OUTPATIENT)
Dept: CASE MANAGEMENT | Age: 83
End: 2023-05-23

## 2023-05-23 VITALS
OXYGEN SATURATION: 97 % | DIASTOLIC BLOOD PRESSURE: 77 MMHG | TEMPERATURE: 98 F | HEART RATE: 74 BPM | SYSTOLIC BLOOD PRESSURE: 128 MMHG | RESPIRATION RATE: 20 BRPM

## 2023-05-23 DIAGNOSIS — J12.82 PNEUMONIA DUE TO COVID-19 VIRUS: Primary | ICD-10-CM

## 2023-05-23 DIAGNOSIS — R63.0 POOR APPETITE: ICD-10-CM

## 2023-05-23 DIAGNOSIS — U07.1 PNEUMONIA DUE TO COVID-19 VIRUS: Primary | ICD-10-CM

## 2023-05-23 DIAGNOSIS — G89.4 CHRONIC PAIN SYNDROME: ICD-10-CM

## 2023-05-23 DIAGNOSIS — F41.9 ANXIETY: ICD-10-CM

## 2023-05-23 DIAGNOSIS — R68.2 DRY MOUTH: ICD-10-CM

## 2023-05-23 DIAGNOSIS — I10 ESSENTIAL HYPERTENSION: ICD-10-CM

## 2023-05-23 DIAGNOSIS — E03.9 HYPOTHYROIDISM, UNSPECIFIED TYPE: ICD-10-CM

## 2023-05-23 DIAGNOSIS — K21.9 GASTROESOPHAGEAL REFLUX DISEASE WITHOUT ESOPHAGITIS: ICD-10-CM

## 2023-05-23 DIAGNOSIS — Z78.9 DECREASED ACTIVITIES OF DAILY LIVING (ADL): ICD-10-CM

## 2023-05-23 PROCEDURE — 1111F DSCHRG MED/CURRENT MED MERGE: CPT

## 2023-05-23 PROCEDURE — 99316 NF DSCHRG MGMT 30 MIN+: CPT

## 2023-05-23 NOTE — PROGRESS NOTES
TCM order received from SNF. Patient will be discharging to home today. TCM episode set up for patient. See next patient outreach encounter for patient contact. Encounter closing.

## 2023-05-24 ENCOUNTER — TELEPHONE (OUTPATIENT)
Dept: FAMILY MEDICINE CLINIC | Facility: CLINIC | Age: 83
End: 2023-05-24

## 2023-05-24 ENCOUNTER — PATIENT OUTREACH (OUTPATIENT)
Dept: CASE MANAGEMENT | Age: 83
End: 2023-05-24

## 2023-05-24 DIAGNOSIS — Z02.9 ENCOUNTERS FOR UNSPECIFIED ADMINISTRATIVE PURPOSE: ICD-10-CM

## 2023-05-24 DIAGNOSIS — J18.9 COMMUNITY ACQUIRED PNEUMONIA OF RIGHT MIDDLE LOBE OF LUNG: ICD-10-CM

## 2023-05-24 PROCEDURE — 1111F DSCHRG MED/CURRENT MED MERGE: CPT

## 2023-05-24 NOTE — PROGRESS NOTES
LINDSAYMICHELLE for post hospital follow up. [facility] contact information provided as well as [facility] office number, [phone].

## 2023-05-30 ENCOUNTER — LAB REQUISITION (OUTPATIENT)
Dept: LAB | Facility: HOSPITAL | Age: 83
End: 2023-05-30
Attending: FAMILY MEDICINE
Payer: MEDICARE

## 2023-05-30 ENCOUNTER — LAB REQUISITION (OUTPATIENT)
Dept: LAB | Age: 83
End: 2023-05-30
Payer: MEDICARE

## 2023-05-30 DIAGNOSIS — J12.82 PNEUMONIA DUE TO CORONAVIRUS DISEASE 2019 (CODE): ICD-10-CM

## 2023-05-30 DIAGNOSIS — I10 ESSENTIAL (PRIMARY) HYPERTENSION: ICD-10-CM

## 2023-05-30 LAB
ANION GAP SERPL CALC-SCNC: 3 MMOL/L (ref 0–18)
BASOPHILS # BLD AUTO: 0.02 X10(3) UL (ref 0–0.2)
BASOPHILS NFR BLD AUTO: 0.5 %
BUN BLD-MCNC: 18 MG/DL (ref 7–18)
CALCIUM BLD-MCNC: 8.8 MG/DL (ref 8.5–10.1)
CHLORIDE SERPL-SCNC: 102 MMOL/L (ref 98–112)
CO2 SERPL-SCNC: 30 MMOL/L (ref 21–32)
CREAT BLD-MCNC: 1.16 MG/DL
EOSINOPHIL # BLD AUTO: 0.07 X10(3) UL (ref 0–0.7)
EOSINOPHIL NFR BLD AUTO: 1.6 %
ERYTHROCYTE [DISTWIDTH] IN BLOOD BY AUTOMATED COUNT: 14.9 %
FASTING STATUS PATIENT QL REPORTED: NO
GFR SERPLBLD BASED ON 1.73 SQ M-ARVRAT: 62 ML/MIN/1.73M2 (ref 60–?)
GLUCOSE BLD-MCNC: 109 MG/DL (ref 70–99)
HCT VFR BLD AUTO: 38.1 %
HGB BLD-MCNC: 12.4 G/DL
IMM GRANULOCYTES # BLD AUTO: 0.02 X10(3) UL (ref 0–1)
IMM GRANULOCYTES NFR BLD: 0.5 %
LYMPHOCYTES # BLD AUTO: 1.36 X10(3) UL (ref 1–4)
LYMPHOCYTES NFR BLD AUTO: 31.9 %
MCH RBC QN AUTO: 32 PG (ref 26–34)
MCHC RBC AUTO-ENTMCNC: 32.5 G/DL (ref 31–37)
MCV RBC AUTO: 98.2 FL
MONOCYTES # BLD AUTO: 0.46 X10(3) UL (ref 0.1–1)
MONOCYTES NFR BLD AUTO: 10.8 %
NEUTROPHILS # BLD AUTO: 2.34 X10 (3) UL (ref 1.5–7.7)
NEUTROPHILS # BLD AUTO: 2.34 X10(3) UL (ref 1.5–7.7)
NEUTROPHILS NFR BLD AUTO: 54.7 %
OSMOLALITY SERPL CALC.SUM OF ELEC: 282 MOSM/KG (ref 275–295)
PLATELET # BLD AUTO: 135 10(3)UL (ref 150–450)
POTASSIUM SERPL-SCNC: 4.9 MMOL/L (ref 3.5–5.1)
RBC # BLD AUTO: 3.88 X10(6)UL
SODIUM SERPL-SCNC: 135 MMOL/L (ref 136–145)
WBC # BLD AUTO: 4.3 X10(3) UL (ref 4–11)

## 2023-05-30 PROCEDURE — 85025 COMPLETE CBC W/AUTO DIFF WBC: CPT

## 2023-05-30 PROCEDURE — 85025 COMPLETE CBC W/AUTO DIFF WBC: CPT | Performed by: FAMILY MEDICINE

## 2023-05-30 PROCEDURE — 80048 BASIC METABOLIC PNL TOTAL CA: CPT | Performed by: FAMILY MEDICINE

## 2023-05-30 PROCEDURE — 80048 BASIC METABOLIC PNL TOTAL CA: CPT

## 2023-06-18 ENCOUNTER — PATIENT MESSAGE (OUTPATIENT)
Dept: FAMILY MEDICINE CLINIC | Facility: CLINIC | Age: 83
End: 2023-06-18

## 2023-06-18 DIAGNOSIS — F51.01 PRIMARY INSOMNIA: ICD-10-CM

## 2023-06-18 DIAGNOSIS — F06.8 ANXIETY DISORDER DUE TO MULTIPLE MEDICAL PROBLEMS: ICD-10-CM

## 2023-06-19 RX ORDER — CLONAZEPAM 2 MG/1
2 TABLET ORAL NIGHTLY
Qty: 30 TABLET | Refills: 0 | Status: SHIPPED | OUTPATIENT
Start: 2023-06-19

## 2023-06-19 NOTE — TELEPHONE ENCOUNTER
From: Jaqueline Zuleta  To: Charisma Gimenez MD  Sent: 6/18/2023 11:01 AM CDT  Subject: Clonazepam    Dr. Viji Calvillo,  Can I get a prescription for Clonazepam, 2MG, with refills, to help me sleep.    Thank You,  Nuvia Raya

## 2023-07-03 RX ORDER — PANTOPRAZOLE SODIUM 40 MG/1
40 TABLET, DELAYED RELEASE ORAL
Qty: 90 TABLET | Refills: 1 | Status: SHIPPED | OUTPATIENT
Start: 2023-07-03

## 2023-07-21 ENCOUNTER — PATIENT MESSAGE (OUTPATIENT)
Dept: FAMILY MEDICINE CLINIC | Facility: CLINIC | Age: 83
End: 2023-07-21

## 2023-07-21 DIAGNOSIS — F51.01 PRIMARY INSOMNIA: ICD-10-CM

## 2023-07-21 DIAGNOSIS — F06.8 ANXIETY DISORDER DUE TO MULTIPLE MEDICAL PROBLEMS: ICD-10-CM

## 2023-07-21 RX ORDER — CLONAZEPAM 2 MG/1
2 TABLET ORAL NIGHTLY
Qty: 30 TABLET | Refills: 0 | Status: SHIPPED | OUTPATIENT
Start: 2023-07-21

## 2023-07-21 NOTE — TELEPHONE ENCOUNTER
From: Vielka Zuleta  To: Margarette Razo MD  Sent: 7/21/2023 2:27 PM CDT  Subject: Clonazepam    Dr. Gilberto Luis,  Can I get a prescription for Clonazepam (2 MG) with refills to help me sleep?   Thank You,  Stephy Hinojosa

## 2023-08-17 ENCOUNTER — PATIENT MESSAGE (OUTPATIENT)
Dept: FAMILY MEDICINE CLINIC | Facility: CLINIC | Age: 83
End: 2023-08-17

## 2023-08-17 DIAGNOSIS — F06.8 ANXIETY DISORDER DUE TO MULTIPLE MEDICAL PROBLEMS: ICD-10-CM

## 2023-08-17 DIAGNOSIS — F51.01 PRIMARY INSOMNIA: ICD-10-CM

## 2023-08-17 RX ORDER — CLONAZEPAM 2 MG/1
2 TABLET ORAL NIGHTLY
Qty: 30 TABLET | Refills: 0 | Status: SHIPPED | OUTPATIENT
Start: 2023-08-17

## 2023-08-17 NOTE — TELEPHONE ENCOUNTER
From: Christophe Zuleta  To: Shaina Nash MD  Sent: 8/17/2023 4:01 PM CDT  Subject: Prescription     Dr. Cris Orellana  Can I get a prescription for Clonazepam (2 MG) with refills to help me sleep?   Thank You,  Desi Sommers

## 2023-08-17 NOTE — TELEPHONE ENCOUNTER
Medication Quantity Refills Start End   clonazePAM 2 MG Oral Tab 30 tablet 0 7/21/2023    Sig:   Take 1 tablet (2 mg total) by mouth nightly.      Route:   Oral       10/20/22 last OV  Approve/deny:

## 2023-08-21 ENCOUNTER — TELEPHONE (OUTPATIENT)
Dept: FAMILY MEDICINE CLINIC | Facility: CLINIC | Age: 83
End: 2023-08-21

## 2023-08-21 NOTE — TELEPHONE ENCOUNTER
Carrillo Garcia MD  [facility] Clinical Staff  Caller: Unspecified (Today,  9:14 AM)  Both medications are sedating. I prescribed the clonazepam so okay to continue that. I am not sure where the fentanyl is coming from    Pharmacy informed.

## 2023-09-24 ENCOUNTER — PATIENT MESSAGE (OUTPATIENT)
Dept: FAMILY MEDICINE CLINIC | Facility: CLINIC | Age: 83
End: 2023-09-24

## 2023-09-24 DIAGNOSIS — F51.01 PRIMARY INSOMNIA: ICD-10-CM

## 2023-09-24 DIAGNOSIS — F06.8 ANXIETY DISORDER DUE TO MULTIPLE MEDICAL PROBLEMS: ICD-10-CM

## 2023-09-25 RX ORDER — CLONAZEPAM 2 MG/1
2 TABLET ORAL NIGHTLY
Qty: 30 TABLET | Refills: 0 | Status: SHIPPED | OUTPATIENT
Start: 2023-09-25

## 2023-09-25 NOTE — TELEPHONE ENCOUNTER
From: Burgess Carolina Zuleta  To: Claudene Junker  Sent: 9/24/2023 3:50 PM CDT  Subject: Prescription    Dr. Eli Jon,  Can I get a prescription for Clonazepam (2mg) with refills to help me sleep. Thank You!   Christina London

## 2023-10-02 RX ORDER — LEVOTHYROXINE SODIUM 0.1 MG/1
100 TABLET ORAL DAILY
Qty: 90 TABLET | Refills: 1 | Status: SHIPPED | OUTPATIENT
Start: 2023-10-02

## 2023-10-20 NOTE — PROGRESS NOTES
Spoke to patient and he is not interested in making an appointment for this visit. Asked him if he would like us to call him back to remind him, he said NO he will call back.
no

## 2023-10-26 ENCOUNTER — PATIENT MESSAGE (OUTPATIENT)
Dept: FAMILY MEDICINE CLINIC | Facility: CLINIC | Age: 83
End: 2023-10-26

## 2023-10-26 DIAGNOSIS — F51.01 PRIMARY INSOMNIA: ICD-10-CM

## 2023-10-26 DIAGNOSIS — F06.8 ANXIETY DISORDER DUE TO MULTIPLE MEDICAL PROBLEMS: ICD-10-CM

## 2023-10-26 RX ORDER — CLONAZEPAM 2 MG/1
2 TABLET ORAL NIGHTLY
Qty: 30 TABLET | Refills: 0 | Status: SHIPPED | OUTPATIENT
Start: 2023-10-26

## 2023-10-26 NOTE — TELEPHONE ENCOUNTER
From: Jean Paul Zuleta  To: Kassie Clarisa  Sent: 10/26/2023 11:36 AM CDT  Subject: Clonazepam Refill    Dr. Nandini Paz,  Can I get a prescription for Clonazepam (2MG) with refills to help me sleep?   Thank You,  Eagle Varghese

## 2023-11-02 DIAGNOSIS — E78.00 PURE HYPERCHOLESTEROLEMIA: ICD-10-CM

## 2023-11-02 RX ORDER — ATORVASTATIN CALCIUM 10 MG/1
10 TABLET, FILM COATED ORAL NIGHTLY
Qty: 30 TABLET | Refills: 0 | Status: SHIPPED | OUTPATIENT
Start: 2023-11-02

## 2023-11-02 RX ORDER — PANTOPRAZOLE SODIUM 40 MG/1
40 TABLET, DELAYED RELEASE ORAL
Qty: 30 TABLET | Refills: 0 | Status: SHIPPED | OUTPATIENT
Start: 2023-11-02

## 2023-11-02 NOTE — TELEPHONE ENCOUNTER
A refill request was received for:  Requested Prescriptions     Pending Prescriptions Disp Refills    PANTOPRAZOLE 40 MG Oral Tab EC [Pharmacy Med Name: PANTOPRAZOLE SOD DR 40 MG TAB] 90 tablet 1     Sig: TAKE 1 TABLET BY MOUTH BEFORE BREAKFAST    ATORVASTATIN 10 MG Oral Tab [Pharmacy Med Name: ATORVASTATIN 10 MG TABLET] 90 tablet 1     Sig: TAKE 1 TABLET BY MOUTH EVERY DAY AT NIGHT     Overdue for appt and labs -#30 day? Last refill date:pantoprazole 7/3/2023  Atorvastatin 10/20/2022       Last office visit: 10/2022    Follow up due:  No future appointments.

## 2023-11-19 ENCOUNTER — APPOINTMENT (OUTPATIENT)
Dept: CT IMAGING | Facility: HOSPITAL | Age: 83
End: 2023-11-19
Attending: EMERGENCY MEDICINE
Payer: MEDICARE

## 2023-11-19 ENCOUNTER — APPOINTMENT (OUTPATIENT)
Dept: GENERAL RADIOLOGY | Facility: HOSPITAL | Age: 83
End: 2023-11-19
Attending: EMERGENCY MEDICINE
Payer: MEDICARE

## 2023-11-19 ENCOUNTER — HOSPITAL ENCOUNTER (OUTPATIENT)
Facility: HOSPITAL | Age: 83
Setting detail: OBSERVATION
Discharge: HOME | End: 2023-11-21
Attending: EMERGENCY MEDICINE | Admitting: STUDENT IN AN ORGANIZED HEALTH CARE EDUCATION/TRAINING PROGRAM
Payer: MEDICARE

## 2023-11-19 DIAGNOSIS — R55 SYNCOPE AND COLLAPSE: Primary | ICD-10-CM

## 2023-11-19 DIAGNOSIS — S00.83XA CONTUSION OF FACE, INITIAL ENCOUNTER: ICD-10-CM

## 2023-11-19 LAB
ALBUMIN SERPL-MCNC: 3.2 G/DL (ref 3.4–5)
ALBUMIN/GLOB SERPL: 0.9 {RATIO} (ref 1–2)
ALP LIVER SERPL-CCNC: 73 U/L
ALT SERPL-CCNC: 15 U/L
ANION GAP SERPL CALC-SCNC: 5 MMOL/L (ref 0–18)
AST SERPL-CCNC: 23 U/L (ref 15–37)
BASOPHILS # BLD AUTO: 0.02 X10(3) UL (ref 0–0.2)
BASOPHILS NFR BLD AUTO: 0.5 %
BILIRUB SERPL-MCNC: 1.3 MG/DL (ref 0.1–2)
BUN BLD-MCNC: 10 MG/DL (ref 9–23)
CALCIUM BLD-MCNC: 8.6 MG/DL (ref 8.5–10.1)
CHLORIDE SERPL-SCNC: 106 MMOL/L (ref 98–112)
CO2 SERPL-SCNC: 29 MMOL/L (ref 21–32)
CREAT BLD-MCNC: 1.37 MG/DL
EGFRCR SERPLBLD CKD-EPI 2021: 51 ML/MIN/1.73M2 (ref 60–?)
EOSINOPHIL # BLD AUTO: 0.14 X10(3) UL (ref 0–0.7)
EOSINOPHIL NFR BLD AUTO: 3.2 %
ERYTHROCYTE [DISTWIDTH] IN BLOOD BY AUTOMATED COUNT: 13.4 %
GLOBULIN PLAS-MCNC: 3.5 G/DL (ref 2.8–4.4)
GLUCOSE BLD-MCNC: 152 MG/DL (ref 70–99)
HCT VFR BLD AUTO: 38.9 %
HGB BLD-MCNC: 13.4 G/DL
IMM GRANULOCYTES # BLD AUTO: 0.03 X10(3) UL (ref 0–1)
IMM GRANULOCYTES NFR BLD: 0.7 %
LYMPHOCYTES # BLD AUTO: 1.67 X10(3) UL (ref 1–4)
LYMPHOCYTES NFR BLD AUTO: 38.1 %
MCH RBC QN AUTO: 32 PG (ref 26–34)
MCHC RBC AUTO-ENTMCNC: 34.4 G/DL (ref 31–37)
MCV RBC AUTO: 92.8 FL
MONOCYTES # BLD AUTO: 0.31 X10(3) UL (ref 0.1–1)
MONOCYTES NFR BLD AUTO: 7.1 %
NEUTROPHILS # BLD AUTO: 2.21 X10 (3) UL (ref 1.5–7.7)
NEUTROPHILS # BLD AUTO: 2.21 X10(3) UL (ref 1.5–7.7)
NEUTROPHILS NFR BLD AUTO: 50.4 %
OSMOLALITY SERPL CALC.SUM OF ELEC: 292 MOSM/KG (ref 275–295)
PLATELET # BLD AUTO: 155 10(3)UL (ref 150–450)
POTASSIUM SERPL-SCNC: 4.4 MMOL/L (ref 3.5–5.1)
PROT SERPL-MCNC: 6.7 G/DL (ref 6.4–8.2)
RBC # BLD AUTO: 4.19 X10(6)UL
SARS-COV-2 RNA RESP QL NAA+PROBE: NOT DETECTED
SODIUM SERPL-SCNC: 140 MMOL/L (ref 136–145)
TROPONIN I SERPL HS-MCNC: 7 NG/L
WBC # BLD AUTO: 4.4 X10(3) UL (ref 4–11)

## 2023-11-19 PROCEDURE — 72125 CT NECK SPINE W/O DYE: CPT | Performed by: EMERGENCY MEDICINE

## 2023-11-19 PROCEDURE — 70486 CT MAXILLOFACIAL W/O DYE: CPT | Performed by: EMERGENCY MEDICINE

## 2023-11-19 PROCEDURE — 76377 3D RENDER W/INTRP POSTPROCES: CPT | Performed by: EMERGENCY MEDICINE

## 2023-11-19 PROCEDURE — 71045 X-RAY EXAM CHEST 1 VIEW: CPT | Performed by: EMERGENCY MEDICINE

## 2023-11-19 PROCEDURE — 99223 1ST HOSP IP/OBS HIGH 75: CPT | Performed by: HOSPITALIST

## 2023-11-19 PROCEDURE — 70450 CT HEAD/BRAIN W/O DYE: CPT | Performed by: EMERGENCY MEDICINE

## 2023-11-19 RX ORDER — SODIUM CHLORIDE 9 MG/ML
125 INJECTION, SOLUTION INTRAVENOUS CONTINUOUS
Status: DISCONTINUED | OUTPATIENT
Start: 2023-11-19 | End: 2023-11-21

## 2023-11-19 RX ORDER — ECHINACEA PURPUREA EXTRACT 125 MG
1 TABLET ORAL
Status: DISCONTINUED | OUTPATIENT
Start: 2023-11-19 | End: 2023-11-21

## 2023-11-19 RX ORDER — BISACODYL 10 MG
10 SUPPOSITORY, RECTAL RECTAL
Status: DISCONTINUED | OUTPATIENT
Start: 2023-11-19 | End: 2023-11-21

## 2023-11-19 RX ORDER — ALBUTEROL SULFATE 90 UG/1
2 AEROSOL, METERED RESPIRATORY (INHALATION) EVERY 6 HOURS PRN
Status: DISCONTINUED | OUTPATIENT
Start: 2023-11-19 | End: 2023-11-21

## 2023-11-19 RX ORDER — HYDROMORPHONE HYDROCHLORIDE 1 MG/ML
0.5 INJECTION, SOLUTION INTRAMUSCULAR; INTRAVENOUS; SUBCUTANEOUS EVERY 30 MIN PRN
Status: DISCONTINUED | OUTPATIENT
Start: 2023-11-19 | End: 2023-11-21

## 2023-11-19 RX ORDER — ACETAMINOPHEN 500 MG
500 TABLET ORAL EVERY 4 HOURS PRN
Status: DISCONTINUED | OUTPATIENT
Start: 2023-11-19 | End: 2023-11-21

## 2023-11-19 RX ORDER — PANTOPRAZOLE SODIUM 40 MG/1
40 TABLET, DELAYED RELEASE ORAL
Status: DISCONTINUED | OUTPATIENT
Start: 2023-11-20 | End: 2023-11-21

## 2023-11-19 RX ORDER — ATORVASTATIN CALCIUM 10 MG/1
10 TABLET, FILM COATED ORAL NIGHTLY
Status: DISCONTINUED | OUTPATIENT
Start: 2023-11-19 | End: 2023-11-21

## 2023-11-19 RX ORDER — POLYETHYLENE GLYCOL 3350 17 G/17G
17 POWDER, FOR SOLUTION ORAL DAILY PRN
Status: DISCONTINUED | OUTPATIENT
Start: 2023-11-19 | End: 2023-11-21

## 2023-11-19 RX ORDER — MELATONIN
3 NIGHTLY PRN
Status: DISCONTINUED | OUTPATIENT
Start: 2023-11-19 | End: 2023-11-19

## 2023-11-19 RX ORDER — MELATONIN
6 NIGHTLY PRN
Status: DISCONTINUED | OUTPATIENT
Start: 2023-11-19 | End: 2023-11-21

## 2023-11-19 RX ORDER — ONDANSETRON 2 MG/ML
4 INJECTION INTRAMUSCULAR; INTRAVENOUS EVERY 6 HOURS PRN
Status: DISCONTINUED | OUTPATIENT
Start: 2023-11-19 | End: 2023-11-21

## 2023-11-19 RX ORDER — SODIUM CHLORIDE 9 MG/ML
INJECTION, SOLUTION INTRAVENOUS CONTINUOUS
Status: DISCONTINUED | OUTPATIENT
Start: 2023-11-19 | End: 2023-11-21

## 2023-11-19 RX ORDER — METOCLOPRAMIDE HYDROCHLORIDE 5 MG/ML
5 INJECTION INTRAMUSCULAR; INTRAVENOUS EVERY 8 HOURS PRN
Status: DISCONTINUED | OUTPATIENT
Start: 2023-11-19 | End: 2023-11-21

## 2023-11-19 RX ORDER — CLONAZEPAM 0.5 MG/1
2 TABLET ORAL NIGHTLY
Status: DISCONTINUED | OUTPATIENT
Start: 2023-11-19 | End: 2023-11-21

## 2023-11-19 RX ORDER — ENEMA 19; 7 G/133ML; G/133ML
1 ENEMA RECTAL ONCE AS NEEDED
Status: DISCONTINUED | OUTPATIENT
Start: 2023-11-19 | End: 2023-11-21

## 2023-11-19 RX ORDER — FENTANYL 25 UG/1
1 PATCH TRANSDERMAL
Status: DISCONTINUED | OUTPATIENT
Start: 2023-11-20 | End: 2023-11-20

## 2023-11-19 RX ORDER — LEVOTHYROXINE SODIUM 0.1 MG/1
100 TABLET ORAL
Status: DISCONTINUED | OUTPATIENT
Start: 2023-11-20 | End: 2023-11-21

## 2023-11-19 RX ORDER — SENNOSIDES 8.6 MG
17.2 TABLET ORAL NIGHTLY PRN
Status: DISCONTINUED | OUTPATIENT
Start: 2023-11-19 | End: 2023-11-21

## 2023-11-19 NOTE — ED INITIAL ASSESSMENT (HPI)
Pt states he passed out and hit his head on the counter; EMS states LOC;  Aox4 when w/ EMS; no blood thinners;

## 2023-11-20 ENCOUNTER — APPOINTMENT (OUTPATIENT)
Dept: CV DIAGNOSTICS | Facility: HOSPITAL | Age: 83
End: 2023-11-20
Attending: HOSPITALIST
Payer: MEDICARE

## 2023-11-20 LAB
ANION GAP SERPL CALC-SCNC: 5 MMOL/L (ref 0–18)
ATRIAL RATE: 72 BPM
BASOPHILS # BLD AUTO: 0.03 X10(3) UL (ref 0–0.2)
BASOPHILS NFR BLD AUTO: 0.5 %
BILIRUB UR QL STRIP.AUTO: NEGATIVE
BUN BLD-MCNC: 10 MG/DL (ref 9–23)
CALCIUM BLD-MCNC: 8.3 MG/DL (ref 8.5–10.1)
CHLORIDE SERPL-SCNC: 105 MMOL/L (ref 98–112)
CLARITY UR REFRACT.AUTO: CLEAR
CO2 SERPL-SCNC: 29 MMOL/L (ref 21–32)
CREAT BLD-MCNC: 0.97 MG/DL
EGFRCR SERPLBLD CKD-EPI 2021: 77 ML/MIN/1.73M2 (ref 60–?)
EOSINOPHIL # BLD AUTO: 0.13 X10(3) UL (ref 0–0.7)
EOSINOPHIL NFR BLD AUTO: 2.3 %
ERYTHROCYTE [DISTWIDTH] IN BLOOD BY AUTOMATED COUNT: 13.2 %
EST. AVERAGE GLUCOSE BLD GHB EST-MCNC: 117 MG/DL (ref 68–126)
GLUCOSE BLD-MCNC: 88 MG/DL (ref 70–99)
GLUCOSE UR STRIP.AUTO-MCNC: NORMAL MG/DL
HBA1C MFR BLD: 5.7 % (ref ?–5.7)
HCT VFR BLD AUTO: 33.2 %
HGB BLD-MCNC: 11.3 G/DL
IMM GRANULOCYTES # BLD AUTO: 0.01 X10(3) UL (ref 0–1)
IMM GRANULOCYTES NFR BLD: 0.2 %
KETONES UR STRIP.AUTO-MCNC: NEGATIVE MG/DL
LEUKOCYTE ESTERASE UR QL STRIP.AUTO: NEGATIVE
LYMPHOCYTES # BLD AUTO: 1.62 X10(3) UL (ref 1–4)
LYMPHOCYTES NFR BLD AUTO: 28.1 %
MAGNESIUM SERPL-MCNC: 2 MG/DL (ref 1.6–2.6)
MCH RBC QN AUTO: 31.8 PG (ref 26–34)
MCHC RBC AUTO-ENTMCNC: 34 G/DL (ref 31–37)
MCV RBC AUTO: 93.5 FL
MONOCYTES # BLD AUTO: 0.51 X10(3) UL (ref 0.1–1)
MONOCYTES NFR BLD AUTO: 8.8 %
NEUTROPHILS # BLD AUTO: 3.47 X10 (3) UL (ref 1.5–7.7)
NEUTROPHILS # BLD AUTO: 3.47 X10(3) UL (ref 1.5–7.7)
NEUTROPHILS NFR BLD AUTO: 60.1 %
NITRITE UR QL STRIP.AUTO: NEGATIVE
OSMOLALITY SERPL CALC.SUM OF ELEC: 286 MOSM/KG (ref 275–295)
P AXIS: 77 DEGREES
P-R INTERVAL: 120 MS
PH UR STRIP.AUTO: 6.5 [PH] (ref 5–8)
PLATELET # BLD AUTO: 123 10(3)UL (ref 150–450)
POTASSIUM SERPL-SCNC: 3.9 MMOL/L (ref 3.5–5.1)
PROT UR STRIP.AUTO-MCNC: NEGATIVE MG/DL
Q-T INTERVAL: 364 MS
QRS DURATION: 80 MS
QTC CALCULATION (BEZET): 398 MS
R AXIS: -17 DEGREES
RBC # BLD AUTO: 3.55 X10(6)UL
RBC UR QL AUTO: NEGATIVE
SODIUM SERPL-SCNC: 139 MMOL/L (ref 136–145)
SP GR UR STRIP.AUTO: 1.01 (ref 1–1.03)
T AXIS: 54 DEGREES
UROBILINOGEN UR STRIP.AUTO-MCNC: NORMAL MG/DL
VENTRICULAR RATE: 72 BPM
WBC # BLD AUTO: 5.8 X10(3) UL (ref 4–11)

## 2023-11-20 PROCEDURE — 93306 TTE W/DOPPLER COMPLETE: CPT | Performed by: HOSPITALIST

## 2023-11-20 PROCEDURE — 99232 SBSQ HOSP IP/OBS MODERATE 35: CPT | Performed by: HOSPITALIST

## 2023-11-20 RX ORDER — FENTANYL 25 UG/1
1 PATCH TRANSDERMAL
Status: DISCONTINUED | OUTPATIENT
Start: 2023-11-21 | End: 2023-11-21

## 2023-11-20 NOTE — PHYSICAL THERAPY NOTE
PT order received. Chart reviewed. RN gave clearance. Discussed role and goal of physical therapy in hospital setting. Pt stating \"I can't get out of bed with my head spinning like this! \" Encouraged EOB sitting, pt continues to adamantly refuse and said \"hell no! \" Discussed risk of deconditioning and developing pneumonia, but continues to confuse. Called RN to report refusal. After hanging up, pt's bed alarm was going off. Upon entering room, asked pt what he was trying to do. He stated that he was trying to get out of bed. Therapists asked again if he wanted to even though he had just refused, pt then stated that he was too dizzy to get out of bed. Will re-attempt as medically appropriate and as schedule allows and as pt in agreement.

## 2023-11-20 NOTE — PROGRESS NOTES
11/20/23 0641 11/20/23 0642 11/20/23 0645   Vital Signs   Pulse 85 79 89   /79 136/83 107/69   MAP (mmHg) 97 100 82   BP Location Left arm  --   --    BP Method Automatic  --   --    Patient Position Lying Sitting Standing   Oxygen Therapy   SpO2 97 % 98 % 97 %         PT SYMPTOMATIC C/O DIZZINESS AND LIGHTHEADEDNESS WHILE LAYING SITTING AND STANDING.

## 2023-11-20 NOTE — ED QUICK NOTES
Spoke w/wife who asked for update; gave update but still waiting on CT scan where pt is now. Will pass on the next shift to call wife w/update since she can't be here due to her own health issues.

## 2023-11-20 NOTE — ED QUICK NOTES
Bedside patient report given to [facility]. Needs fluids, pain meds if pt has changed his mind; being admitted; please update wife w/more results.

## 2023-11-20 NOTE — ED QUICK NOTES
Orders for admission, patient is aware of plan and ready to go upstairs. Any questions, please call ED RN Jane Vicente at extension [phone].      Patient Covid vaccination status: Fully vaccinated     COVID Test Ordered in ED: Rapid SARS-CoV-2 by PCR    COVID Suspicion at Admission: N/A    Running Infusions:    sodium chloride 125 mL/hr (11/19/23 1940)        Mental Status/LOC at time of transport: A&Ox4    Other pertinent information:   CIWA score: N/A   NIH score:  N/A

## 2023-11-20 NOTE — BH RN ADMISSION NOTE
NURSING ADMISSION NOTE      Patient admitted via transport. Oriented to room. Safety precautions initiated. Bed in low position. Call light in reach. Pt admitted from ER for syncope and collapse. Admission assessment complete. Admit orders received and initiated. Skin check completed with PCT Hatia, Shelley Laceration to the left forehead contusion to the top of head and small abrasion to the right hand. Fentanyl patch located mid-back. ADMISSION NAVIGATOR COMPLETED.

## 2023-11-21 VITALS
HEIGHT: 70 IN | SYSTOLIC BLOOD PRESSURE: 145 MMHG | DIASTOLIC BLOOD PRESSURE: 93 MMHG | RESPIRATION RATE: 18 BRPM | WEIGHT: 119.06 LBS | BODY MASS INDEX: 17.04 KG/M2 | HEART RATE: 82 BPM | TEMPERATURE: 99 F | OXYGEN SATURATION: 93 %

## 2023-11-21 LAB
ANION GAP SERPL CALC-SCNC: 1 MMOL/L (ref 0–18)
BUN BLD-MCNC: 8 MG/DL (ref 9–23)
CALCIUM BLD-MCNC: 8.2 MG/DL (ref 8.5–10.1)
CHLORIDE SERPL-SCNC: 107 MMOL/L (ref 98–112)
CO2 SERPL-SCNC: 31 MMOL/L (ref 21–32)
CREAT BLD-MCNC: 0.97 MG/DL
EGFRCR SERPLBLD CKD-EPI 2021: 77 ML/MIN/1.73M2 (ref 60–?)
GLUCOSE BLD-MCNC: 95 MG/DL (ref 70–99)
OSMOLALITY SERPL CALC.SUM OF ELEC: 286 MOSM/KG (ref 275–295)
POTASSIUM SERPL-SCNC: 4.1 MMOL/L (ref 3.5–5.1)
SODIUM SERPL-SCNC: 139 MMOL/L (ref 136–145)

## 2023-11-21 PROCEDURE — 99239 HOSP IP/OBS DSCHRG MGMT >30: CPT | Performed by: HOSPITALIST

## 2023-11-21 RX ORDER — MECLIZINE HCL 12.5 MG/1
12.5 TABLET ORAL 3 TIMES DAILY PRN
Status: DISCONTINUED | OUTPATIENT
Start: 2023-11-21 | End: 2023-11-21

## 2023-11-21 NOTE — PHYSICAL THERAPY NOTE
PHYSICAL THERAPY EVALUATION - INPATIENT     Room Number: 4939/6593-E  Evaluation Date: 2023  Type of Evaluation: Initial  Physician Order: PT Eval and Treat    Presenting Problem: syncope  Co-Morbidities : lumbar fusion  Reason for Therapy: Mobility Dysfunction and Discharge Planning      ASSESSMENT   Pt is a 78year old male admitted on 2023 for syncope. CT head and C/S (-). Pt presenting with orthostatic hypotension. Functional outcome measures completed include [facility]. The AM-PAC '6-Clicks' Inpatient Basic Mobility Short Form was completed and this patient is demonstrating a Approx Degree of Impairment: 35.83%  degree of impairment in mobility. Research supports that patients with this level of impairment may benefit from return home. PT Discharge Recommendations: Home      PLAN  Patient has been evaluated and presents with no skilled Physical Therapy needs at this time. Patient discharged from Physical Therapy services. Please re-order if a new functional limitation presents during this admission. GOALS  Patient was able to achieve the following goals . .. Patient was able to transfer Safely and independently   Patient able to ambulate on level surfaces Safely and independently         HOME SITUATION  Type of Home: [address]: One level                Lives With: Spouse (son lives nearby)  Drives: No  Patient Owned Equipment: Rolling walker       Prior Level of [place]: Pt lives with spouse in single story home. Pt typically independent with ADL and mobility. Pt ambulates with RW. SUBJECTIVE  \"I think I should walk more. \"       OBJECTIVE  Precautions: Bed/chair alarm  Fall Risk: Standard fall risk    WEIGHT BEARING RESTRICTION  Weight Bearing Restriction: None                PAIN ASSESSMENT  Ratin          COGNITION  Overall Cognitive Status:  WFL - within functional limits    RANGE OF MOTION AND STRENGTH ASSESSMENT  Upper extremity ROM and strength are within functional limits     Lower extremity ROM is within functional limits     Lower extremity strength is within functional limits       BALANCE  Static Sitting: Good  Dynamic Sitting: Good  Static Standing: Fair  Dynamic Standing: Fair    ADDITIONAL TESTS                                    ACTIVITY TOLERANCE                         O2 WALK       NEUROLOGICAL FINDINGS                        AM-PAC '6-Clicks' INPATIENT SHORT FORM - BASIC MOBILITY  How much difficulty does the patient currently have. .. Patient Difficulty: Turning over in bed (including adjusting bedclothes, sheets and blankets)?: None   Patient Difficulty: Sitting down on and standing up from a chair with arms (e.g., wheelchair, bedside commode, etc.): A Little   Patient Difficulty: Moving from lying on back to sitting on the side of the bed?: None   How much help from another person does the patient currently need. .. Help from Another: Moving to and from a bed to a chair (including a wheelchair)?: A Little   Help from Another: Need to walk in hospital room?: A Little   Help from Another: Climbing 3-5 steps with a railing?: A Little       AM-PAC Score:  Raw Score: 20   Approx Degree of Impairment: 35.83%   Standardized Score (AM-PAC Scale): 47.67   CMS Modifier (G-Code): CJ    FUNCTIONAL ABILITY STATUS  Gait Assessment   Functional Mobility/Gait Assessment  Gait Assistance: Supervision  Distance (ft): 400  Assistive Device: Rolling walker  Pattern: Within Functional Limits    Skilled Therapy Provided   Pt received supine in bed and is agreeable to therapy. Pt supine-sit without assist. Pt demonstrates good static and dynamic sitting balance at EOB. Pt denies dizziness throughout session. Pt sit-stand with RW and supervision. Pt ambulated 400ft with RW and supervision. Pt ambulates with steady step through gait pattern, kyphotic posture, and good ethel. No LOB. Pt educated on activity recommendations and energy conservation.  Pt returned to sitting up in bedside chair. Pt left with call light in reach and alarm donned. Bed Mobility:  Rolling: MOD I  Supine to sit: MOD I   Sit to supine: MOD I     Transfer Mobility:  Sit to stand: supervision   Stand to sit: supervision  Gait = supervision    Therapist's comments: Recommend use of RW.   BP supine: 147/91  BP sittin/91  BP standin/82  BP post ambulation in chair 147/87    Exercise/Education Provided:  Bed mobility  Energy conservation  Functional activity tolerated  Gait training  Posture  Strengthening  Transfer training    Patient End of Session: Up in chair;Needs met;Call light within reach;RN aware of session/findings; All patient questions and concerns addressed; Alarm set    Patient Evaluation Complexity Level:  History Moderate - 1 or 2 personal factors and/or co-morbidities   Examination of body systems Moderate - addressing a total of 3 or more elements   Clinical Presentation Low - Stable   Clinical Decision Making Low Complexity       PT Session Time: 30 minutes  Gait Training: 10 minutes

## 2023-11-22 ENCOUNTER — PATIENT OUTREACH (OUTPATIENT)
Dept: CASE MANAGEMENT | Age: 83
End: 2023-11-22

## 2023-11-22 NOTE — DISCHARGE SUMMARY
[facility] HOSPITALIST  DISCHARGE SUMMARY     Sherry Zuleta Patient Status:  Observation    3/8/1940 MRN [id number]   [facility] 2NE-A Attending No att. providers found   Hosp Day # 0 PCP Bo Recinos MD     Date of Admission: 2023  Date of Discharge:  2023     Discharge Disposition: Home or Self Care    Discharge Diagnosis:    #Syncope and collapse  #Orthostatic hypotension  #Dizziness  #DAVID   #HLD   #Hypothyroidism   #Hyperglycemia   #Depression/anxiety   #Chronic pain  #GERD   #Malnourishment    History of Present Illness: Mildred Pollack is a 78year old male with a   Patient was filling up a water bottle and passed out. He denies fever, chills, n/v.  No cp or sob. He is feeling dizzy now but did not feel dizzy prior. Patient did not eat/drink at all prior to the event. Patient was down for a few minutes, wife called the ambulance. This has happened before, about 1 year ago, nothing was found out at that time. Brief Synopsis:   Patient presented after syncope. He was admitted under telemetry. Seen by cardiology. He had an echocardiogram performed which was unremarkable. He did have positive orthostatics and was treated with IV fluids. His orthostatics were negative at time of discharge. Physical therapy recommended discharge to home. She was cleared by cardiology for discharge. He can resume his regular home meds at discharge, all questions and concerns addressed patient prior to discharge, he is agreeable plan of care as stated. He is encouraged to return to the ER if his symptoms come back or worsen. Lace+ Score: 63  59-90 High Risk  29-58 Medium Risk  0-28   Low Risk       TCM Follow-Up Recommendation:  LACE > 58:  High Risk of readmission after discharge from the hospital.      Procedures during hospitalization:   None    Incidental or significant findings and recommendations (brief descriptions):  N/a    Lab/Test results pending at Discharge: None    Consultants:  Cardiology    Discharge Medication List:     Discharge Medications        CHANGE how you take these medications        Instructions Prescription details   clonazePAM 2 MG Tabs  Commonly known as: KlonoPIN  What changed: Another medication with the same name was removed. Continue taking this medication, and follow the directions you see here. Take 1 tablet (2 mg total) by mouth nightly. Quantity: 30 tablet  Refills: 0            CONTINUE taking these medications        Instructions Prescription details   acetaminophen 500 MG Tabs  Commonly known as: Tylenol Extra Strength      Take 1 tablet (500 mg total) by mouth every 6 (six) hours as needed for Pain. Refills: 0     albuterol 108 (90 Base) MCG/ACT Aers  Commonly known as: Ventolin HFA      Inhale 2 puffs into the lungs every 6 (six) hours as needed for Wheezing. Refills: 0     artificial saliva substitute Soln      Take 10 mL by mouth in the morning, at noon, and at bedtime. Refills: 0     atorvastatin 10 MG Tabs  Commonly known as: Lipitor      Take 1 tablet (10 mg total) by mouth nightly. Quantity: 30 tablet  Refills: 0     cholecalciferol 50 MCG (2000 UT) Caps  Commonly known as: Vitamin D3      Take 1 capsule (2,000 Units total) by mouth daily. Refills: 0     cyanocobalamin 1000 MCG/ML Soln  Commonly known as: Vitamin B12      Inject 1 mL (1,000 mcg total) into the muscle every 30 (thirty) days. Quantity: 3 mL  Refills: 9     docusate sodium 100 MG Caps  Commonly known as: Colace      Take 1 capsule (100 mg total) by mouth 2 (two) times daily. Refills: 0     fentaNYL 25 MCG/HR Pt72  Commonly known as: Duragesic      Place 1 patch onto the skin every 3 (three) days.    Refills: 0     Insulin Syringe-Needle U-100 31G X 5/16\" 1 ML Misc  Commonly known as: BD Insulin Syringe Ultrafine      Use for B12 injection   Quantity: 10 each  Refills: 2     levothyroxine 100 MCG Tabs  Commonly known as: Synthroid      Take 1 tablet (100 mcg total) by mouth daily. Quantity: 90 tablet  Refills: 1     Melatonin 10 MG Caps  Notes to patient: Last dose 11-21. Take 6 mg by mouth nightly as needed. Refills: 0     Naloxone HCl 4 MG/0.1ML Liqd      4 mg by Nasal route as needed. If patient remains unresponsive, repeat dose in other nostril 2-5 minutes after first dose. Quantity: 1 kit  Refills: 0     ondansetron 4 mg tablet  Commonly known as: Zofran      Take 1 tablet (4 mg total) by mouth every 8 (eight) hours as needed for Nausea. Refills: 0     pantoprazole 40 MG Tbec  Commonly known as: Protonix      Take 1 tablet (40 mg total) by mouth before breakfast.   Quantity: 30 tablet  Refills: 0     psyllium 28 % Pack  Commonly known as: Metamucil      Take 1 packet by mouth 2 (two) times daily. Quantity: 15 packet  Refills: 0     sennosides 8.6 MG Tabs  Commonly known as: Senokot      Take 2 tablets (17.2 mg total) by mouth daily as needed (constipation). Refills: 0              [place]    Follow-up appointment:   Shalonda Jc MD  [address] [place] [address]  [phone]    Follow up in 2 week(s)  Office will call you for follow up appt    Breana Barr MD  [facility] [address] [facility] [address] [place] [address]  [phone]    Follow up in 1 week(s)      Appointments for Next 30 Days 11/22/2023 - 12/22/2023      None              -----------------------------------------------------------------------------------------------  PATIENT DISCHARGE INSTRUCTIONS: See electronic chart    José Miguel Epps MD      The 21st Century Cures Act makes medical notes like these available to patients in the interest of transparency. Please be advised this is a medical document. Medical documents are intended to carry relevant information, facts as evident, and the clinical opinion of the practitioner. The medical note is intended as peer to peer communication and may appear blunt or direct.  It is written in medical language and may contain abbreviations or verbiage that are unfamiliar.

## 2023-11-22 NOTE — PROGRESS NOTES
NCM attempted to reach the patient to complete a TCM/HFU call. Left message to call back. [facility] provided direct contact info at [phone].

## 2023-11-22 NOTE — PROGRESS NOTES
NCM attempted to reach the patient to complete a TCM/HFU call. Left message to call back. [facility] provided direct contact info at [phone].

## 2023-11-22 NOTE — PLAN OF CARE
Pt okay to discharge per primary and consults. Patient ambulating and tolerating well. Discharge instructions and education went over with patient & son and verbalized understanding. Patient went home. Patient transport by wheelchair. Sent home with order for event monitor.

## 2023-11-23 DIAGNOSIS — E78.00 PURE HYPERCHOLESTEROLEMIA: ICD-10-CM

## 2023-11-24 ENCOUNTER — PATIENT MESSAGE (OUTPATIENT)
Dept: FAMILY MEDICINE CLINIC | Facility: CLINIC | Age: 83
End: 2023-11-24

## 2023-11-24 RX ORDER — ATORVASTATIN CALCIUM 10 MG/1
10 TABLET, FILM COATED ORAL NIGHTLY
Qty: 30 TABLET | Refills: 0 | Status: SHIPPED | OUTPATIENT
Start: 2023-11-24

## 2023-11-24 NOTE — TELEPHONE ENCOUNTER
A refill request was received for:  Requested Prescriptions     Pending Prescriptions Disp Refills    ATORVASTATIN 10 MG Oral Tab [Pharmacy Med Name: ATORVASTATIN 10 MG TABLET] 30 tablet 0     Sig: TAKE 1 TABLET BY MOUTH EVERY DAY AT NIGHT       Last refill date: 11/2/2023    No lipids in last 12 months  Last office visit:10/2022   My chart sent to remind patient visit overdue-#30 day ? Follow up due:  No future appointments.

## 2023-11-24 NOTE — TELEPHONE ENCOUNTER
From: Rosa Zuleta  To: Catie Hernandez  Sent: 11/24/2023 10:05 AM CST  Subject: Prescription     Dr. Kathrine Munguia,  Can I get a prescription for Clonazepam (2 mg) with refills to help me sleep? Thank You!   Radhames Gutierrez

## 2023-12-11 ENCOUNTER — HOSPITAL ENCOUNTER (INPATIENT)
Facility: HOSPITAL | Age: 83
LOS: 1 days | Discharge: HOME HEALTH | End: 2023-12-14
Attending: STUDENT IN AN ORGANIZED HEALTH CARE EDUCATION/TRAINING PROGRAM | Admitting: HOSPITALIST
Payer: MEDICARE

## 2023-12-11 ENCOUNTER — APPOINTMENT (OUTPATIENT)
Dept: CT IMAGING | Facility: HOSPITAL | Age: 83
End: 2023-12-11
Attending: STUDENT IN AN ORGANIZED HEALTH CARE EDUCATION/TRAINING PROGRAM
Payer: MEDICARE

## 2023-12-11 DIAGNOSIS — R26.81 GAIT INSTABILITY: ICD-10-CM

## 2023-12-11 DIAGNOSIS — F51.01 PRIMARY INSOMNIA: ICD-10-CM

## 2023-12-11 DIAGNOSIS — N17.9 AKI (ACUTE KIDNEY INJURY) (HCC): Primary | ICD-10-CM

## 2023-12-11 DIAGNOSIS — F06.8 ANXIETY DISORDER DUE TO MULTIPLE MEDICAL PROBLEMS: ICD-10-CM

## 2023-12-11 LAB
ALBUMIN SERPL-MCNC: 3.8 G/DL (ref 3.4–5)
ALBUMIN/GLOB SERPL: 1.2 {RATIO} (ref 1–2)
ALP LIVER SERPL-CCNC: 87 U/L
ALT SERPL-CCNC: 16 U/L
ANION GAP SERPL CALC-SCNC: 12 MMOL/L (ref 0–18)
AST SERPL-CCNC: 23 U/L (ref 15–37)
BASOPHILS # BLD AUTO: 0.03 X10(3) UL (ref 0–0.2)
BASOPHILS NFR BLD AUTO: 0.3 %
BILIRUB SERPL-MCNC: 1.9 MG/DL (ref 0.1–2)
BILIRUB UR QL STRIP.AUTO: NEGATIVE
BUN BLD-MCNC: 13 MG/DL (ref 9–23)
CALCIUM BLD-MCNC: 8.9 MG/DL (ref 8.5–10.1)
CHLORIDE SERPL-SCNC: 94 MMOL/L (ref 98–112)
CLARITY UR REFRACT.AUTO: CLEAR
CO2 SERPL-SCNC: 26 MMOL/L (ref 21–32)
CREAT BLD-MCNC: 1.5 MG/DL
EGFRCR SERPLBLD CKD-EPI 2021: 45 ML/MIN/1.73M2 (ref 60–?)
EOSINOPHIL # BLD AUTO: 0 X10(3) UL (ref 0–0.7)
EOSINOPHIL NFR BLD AUTO: 0 %
ERYTHROCYTE [DISTWIDTH] IN BLOOD BY AUTOMATED COUNT: 13.6 %
GLOBULIN PLAS-MCNC: 3.1 G/DL (ref 2.8–4.4)
GLUCOSE BLD-MCNC: 126 MG/DL (ref 70–99)
GLUCOSE UR STRIP.AUTO-MCNC: NORMAL MG/DL
HCT VFR BLD AUTO: 37.5 %
HGB BLD-MCNC: 12.8 G/DL
IMM GRANULOCYTES # BLD AUTO: 0.04 X10(3) UL (ref 0–1)
IMM GRANULOCYTES NFR BLD: 0.4 %
LEUKOCYTE ESTERASE UR QL STRIP.AUTO: NEGATIVE
LYMPHOCYTES # BLD AUTO: 1.36 X10(3) UL (ref 1–4)
LYMPHOCYTES NFR BLD AUTO: 13.1 %
MCH RBC QN AUTO: 31.7 PG (ref 26–34)
MCHC RBC AUTO-ENTMCNC: 34.1 G/DL (ref 31–37)
MCV RBC AUTO: 92.8 FL
MONOCYTES # BLD AUTO: 0.92 X10(3) UL (ref 0.1–1)
MONOCYTES NFR BLD AUTO: 8.9 %
NEUTROPHILS # BLD AUTO: 8.03 X10 (3) UL (ref 1.5–7.7)
NEUTROPHILS # BLD AUTO: 8.03 X10(3) UL (ref 1.5–7.7)
NEUTROPHILS NFR BLD AUTO: 77.3 %
NITRITE UR QL STRIP.AUTO: NEGATIVE
OSMOLALITY SERPL CALC.SUM OF ELEC: 276 MOSM/KG (ref 275–295)
PH UR STRIP.AUTO: 6.5 [PH] (ref 5–8)
PLATELET # BLD AUTO: 203 10(3)UL (ref 150–450)
POTASSIUM SERPL-SCNC: 4.6 MMOL/L (ref 3.5–5.1)
PROT SERPL-MCNC: 6.9 G/DL (ref 6.4–8.2)
PROT UR STRIP.AUTO-MCNC: NEGATIVE MG/DL
RBC # BLD AUTO: 4.04 X10(6)UL
RBC UR QL AUTO: NEGATIVE
SODIUM SERPL-SCNC: 132 MMOL/L (ref 136–145)
SP GR UR STRIP.AUTO: 1.01 (ref 1–1.03)
UROBILINOGEN UR STRIP.AUTO-MCNC: NORMAL MG/DL
WBC # BLD AUTO: 10.4 X10(3) UL (ref 4–11)

## 2023-12-11 PROCEDURE — 70450 CT HEAD/BRAIN W/O DYE: CPT | Performed by: STUDENT IN AN ORGANIZED HEALTH CARE EDUCATION/TRAINING PROGRAM

## 2023-12-11 PROCEDURE — 99223 1ST HOSP IP/OBS HIGH 75: CPT | Performed by: HOSPITALIST

## 2023-12-11 RX ORDER — HALOPERIDOL 5 MG/ML
5 INJECTION INTRAMUSCULAR ONCE
Status: DISCONTINUED | OUTPATIENT
Start: 2023-12-11 | End: 2023-12-11

## 2023-12-11 RX ORDER — LORAZEPAM 2 MG/ML
1 INJECTION INTRAMUSCULAR ONCE
Status: COMPLETED | OUTPATIENT
Start: 2023-12-11 | End: 2023-12-11

## 2023-12-11 RX ORDER — LORAZEPAM 2 MG/ML
INJECTION INTRAMUSCULAR
Status: COMPLETED
Start: 2023-12-11 | End: 2023-12-11

## 2023-12-11 RX ORDER — HALOPERIDOL 5 MG/ML
5 INJECTION INTRAMUSCULAR ONCE
Status: COMPLETED | OUTPATIENT
Start: 2023-12-11 | End: 2023-12-11

## 2023-12-12 PROBLEM — F32.A DEPRESSIVE DISORDER: Status: ACTIVE | Noted: 2023-12-12

## 2023-12-12 PROBLEM — R26.81 GAIT INSTABILITY: Status: ACTIVE | Noted: 2023-12-12

## 2023-12-12 PROBLEM — R41.0 DELIRIUM: Status: ACTIVE | Noted: 2023-12-12

## 2023-12-12 PROBLEM — G93.40 ACUTE ENCEPHALOPATHY: Status: ACTIVE | Noted: 2023-12-12

## 2023-12-12 LAB
ANION GAP SERPL CALC-SCNC: 5 MMOL/L (ref 0–18)
ATRIAL RATE: 77 BPM
BASOPHILS # BLD AUTO: 0.02 X10(3) UL (ref 0–0.2)
BASOPHILS NFR BLD AUTO: 0.3 %
BUN BLD-MCNC: 11 MG/DL (ref 9–23)
CALCIUM BLD-MCNC: 8.6 MG/DL (ref 8.5–10.1)
CHLORIDE SERPL-SCNC: 105 MMOL/L (ref 98–112)
CO2 SERPL-SCNC: 28 MMOL/L (ref 21–32)
CREAT BLD-MCNC: 1.08 MG/DL
EGFRCR SERPLBLD CKD-EPI 2021: 67 ML/MIN/1.73M2 (ref 60–?)
EOSINOPHIL # BLD AUTO: 0.03 X10(3) UL (ref 0–0.7)
EOSINOPHIL NFR BLD AUTO: 0.5 %
ERYTHROCYTE [DISTWIDTH] IN BLOOD BY AUTOMATED COUNT: 13.7 %
GLUCOSE BLD-MCNC: 87 MG/DL (ref 70–99)
HCT VFR BLD AUTO: 33.8 %
HGB BLD-MCNC: 11.5 G/DL
IMM GRANULOCYTES # BLD AUTO: 0.02 X10(3) UL (ref 0–1)
IMM GRANULOCYTES NFR BLD: 0.3 %
LYMPHOCYTES # BLD AUTO: 1.16 X10(3) UL (ref 1–4)
LYMPHOCYTES NFR BLD AUTO: 19.3 %
MCH RBC QN AUTO: 31.9 PG (ref 26–34)
MCHC RBC AUTO-ENTMCNC: 34 G/DL (ref 31–37)
MCV RBC AUTO: 93.9 FL
MONOCYTES # BLD AUTO: 0.74 X10(3) UL (ref 0.1–1)
MONOCYTES NFR BLD AUTO: 12.3 %
NEUTROPHILS # BLD AUTO: 4.04 X10 (3) UL (ref 1.5–7.7)
NEUTROPHILS # BLD AUTO: 4.04 X10(3) UL (ref 1.5–7.7)
NEUTROPHILS NFR BLD AUTO: 67.3 %
OSMOLALITY SERPL CALC.SUM OF ELEC: 285 MOSM/KG (ref 275–295)
P AXIS: 79 DEGREES
P-R INTERVAL: 140 MS
PLATELET # BLD AUTO: 162 10(3)UL (ref 150–450)
POTASSIUM SERPL-SCNC: 3.8 MMOL/L (ref 3.5–5.1)
Q-T INTERVAL: 366 MS
QRS DURATION: 82 MS
QTC CALCULATION (BEZET): 414 MS
R AXIS: -4 DEGREES
RBC # BLD AUTO: 3.6 X10(6)UL
SODIUM SERPL-SCNC: 138 MMOL/L (ref 136–145)
T AXIS: 66 DEGREES
VENTRICULAR RATE: 77 BPM
WBC # BLD AUTO: 6 X10(3) UL (ref 4–11)

## 2023-12-12 PROCEDURE — 99232 SBSQ HOSP IP/OBS MODERATE 35: CPT | Performed by: INTERNAL MEDICINE

## 2023-12-12 PROCEDURE — 90792 PSYCH DIAG EVAL W/MED SRVCS: CPT | Performed by: OTHER

## 2023-12-12 RX ORDER — ATORVASTATIN CALCIUM 10 MG/1
10 TABLET, FILM COATED ORAL NIGHTLY
Status: DISCONTINUED | OUTPATIENT
Start: 2023-12-12 | End: 2023-12-14

## 2023-12-12 RX ORDER — MIRTAZAPINE 7.5 MG/1
7.5 TABLET, FILM COATED ORAL NIGHTLY
Status: DISCONTINUED | OUTPATIENT
Start: 2023-12-12 | End: 2023-12-14

## 2023-12-12 RX ORDER — SENNOSIDES 8.6 MG
17.2 TABLET ORAL NIGHTLY PRN
Status: DISCONTINUED | OUTPATIENT
Start: 2023-12-12 | End: 2023-12-14

## 2023-12-12 RX ORDER — METOCLOPRAMIDE HYDROCHLORIDE 5 MG/ML
5 INJECTION INTRAMUSCULAR; INTRAVENOUS EVERY 8 HOURS PRN
Status: DISCONTINUED | OUTPATIENT
Start: 2023-12-12 | End: 2023-12-12

## 2023-12-12 RX ORDER — SODIUM CHLORIDE 9 MG/ML
INJECTION, SOLUTION INTRAVENOUS CONTINUOUS
Status: DISCONTINUED | OUTPATIENT
Start: 2023-12-12 | End: 2023-12-14

## 2023-12-12 RX ORDER — SALIVA STIMULANT COMB. NO.3
10 SPRAY, NON-AEROSOL (ML) MUCOUS MEMBRANE 3 TIMES DAILY
Status: DISCONTINUED | OUTPATIENT
Start: 2023-12-12 | End: 2023-12-14

## 2023-12-12 RX ORDER — ALBUTEROL SULFATE 90 UG/1
2 AEROSOL, METERED RESPIRATORY (INHALATION) EVERY 6 HOURS PRN
Status: DISCONTINUED | OUTPATIENT
Start: 2023-12-12 | End: 2023-12-14

## 2023-12-12 RX ORDER — ACETAMINOPHEN 500 MG
500 TABLET ORAL EVERY 4 HOURS PRN
Status: DISCONTINUED | OUTPATIENT
Start: 2023-12-12 | End: 2023-12-14

## 2023-12-12 RX ORDER — CLONAZEPAM 1 MG/1
1 TABLET ORAL NIGHTLY
Status: DISCONTINUED | OUTPATIENT
Start: 2023-12-12 | End: 2023-12-14

## 2023-12-12 RX ORDER — POLYETHYLENE GLYCOL 3350 17 G/17G
17 POWDER, FOR SOLUTION ORAL DAILY PRN
Status: DISCONTINUED | OUTPATIENT
Start: 2023-12-12 | End: 2023-12-14

## 2023-12-12 RX ORDER — HEPARIN SODIUM 5000 [USP'U]/ML
5000 INJECTION, SOLUTION INTRAVENOUS; SUBCUTANEOUS EVERY 8 HOURS SCHEDULED
Status: DISCONTINUED | OUTPATIENT
Start: 2023-12-12 | End: 2023-12-14

## 2023-12-12 RX ORDER — CLONAZEPAM 1 MG/1
2 TABLET ORAL NIGHTLY
Status: DISCONTINUED | OUTPATIENT
Start: 2023-12-12 | End: 2023-12-12

## 2023-12-12 RX ORDER — MELATONIN
3 NIGHTLY PRN
Status: DISCONTINUED | OUTPATIENT
Start: 2023-12-12 | End: 2023-12-14

## 2023-12-12 RX ORDER — FENTANYL 25 UG/1
1 PATCH TRANSDERMAL
Status: DISCONTINUED | OUTPATIENT
Start: 2023-12-12 | End: 2023-12-14

## 2023-12-12 RX ORDER — BISACODYL 10 MG
10 SUPPOSITORY, RECTAL RECTAL
Status: DISCONTINUED | OUTPATIENT
Start: 2023-12-12 | End: 2023-12-14

## 2023-12-12 RX ORDER — ONDANSETRON 2 MG/ML
4 INJECTION INTRAMUSCULAR; INTRAVENOUS EVERY 6 HOURS PRN
Status: DISCONTINUED | OUTPATIENT
Start: 2023-12-12 | End: 2023-12-14

## 2023-12-12 RX ORDER — HALOPERIDOL 5 MG/ML
1 INJECTION INTRAMUSCULAR EVERY 6 HOURS PRN
Status: DISCONTINUED | OUTPATIENT
Start: 2023-12-12 | End: 2023-12-14

## 2023-12-12 RX ORDER — MELATONIN
6 NIGHTLY PRN
Status: DISCONTINUED | OUTPATIENT
Start: 2023-12-12 | End: 2023-12-14

## 2023-12-12 RX ORDER — HALOPERIDOL 5 MG/ML
2 INJECTION INTRAMUSCULAR EVERY 6 HOURS PRN
Status: DISCONTINUED | OUTPATIENT
Start: 2023-12-12 | End: 2023-12-12

## 2023-12-12 RX ORDER — PANTOPRAZOLE SODIUM 40 MG/1
40 TABLET, DELAYED RELEASE ORAL
Status: DISCONTINUED | OUTPATIENT
Start: 2023-12-12 | End: 2023-12-14

## 2023-12-12 RX ORDER — ECHINACEA PURPUREA EXTRACT 125 MG
1 TABLET ORAL
Status: DISCONTINUED | OUTPATIENT
Start: 2023-12-12 | End: 2023-12-14

## 2023-12-12 RX ORDER — LEVOTHYROXINE SODIUM 0.1 MG/1
100 TABLET ORAL DAILY
Status: DISCONTINUED | OUTPATIENT
Start: 2023-12-12 | End: 2023-12-14

## 2023-12-12 NOTE — CONSULTS
[facility]  Report of Psychiatric Consultation    Antonella Zuleta Patient Status:  Observation    3/8/1940 MRN [id number]   [facility] 4NW-A Attending Cristian Arroyo MD   [facility] Day # 1 PCP Sung Bass MD     Date of Admission: 23  Date of Consult: 23  Reason for Consultation: Altered mental status, eval for mood disorder    Impression:  Primary Psychiatric Diagnosis:  Acute delirium/encephalopathy due to DAVID/dehydration and sleep deprivation and possibly mild benzo withdrawal (ran out of Klonipin last week per hx from wife) and then meds given at [facility] (Haldol 5mg IV, Ativan 1mg IV, Zyprexa 10mg IM, Haldol 2mg IV). Depressive disorder unspecified. Chronic back pain syndrome on chronic opioids (fentanyl 25mcg patch). No hx of forgetfulness or cognitive impairment per hx from wife. Pertinent Medical Diagnoses:  DAVID. HLD. Hypothyroidism. GERD. Recommendations:  1) Decrease Klonipin from 2mg to 1mg nightly with a plan to taper off.    2) Start Remeron 7.5mg nightly to help with insomnia and low appetite and depressive disorder. 3) Decrease Haldol PRN from 2mg to 1mg IV every 6 hrs PRN agitation/psychosis. 4) Will need speech/swallow eval once he is more alert. He c/o dysphagia in the ED. Wife told me that he hurt his jaw and can only tolerate a soft diet. 5) Check serum B12 in AM.     6) As his delirium improves, will discuss recommendation to see an individual psychotherapist and possibly a psychiatrist.     Aidan Layne MD  2023  11:06 AM    History of Present Illness:  79 yo with hx of insomnia and chronic back pain on chronic opioids presented to the ED on 23 for c/o dizziness/lightheadedness/falls and difficultly swallowing. Per hx from wife, he has NO hx of forgetfulness or cognitive impairment. He has appeared depressed and more tired and apathetic to her the past several months.  He has chronic back pain and is mostly sedentary. He uses a walker to ambulate at baseline. He has a hx of recent falls and dizziness. Recently admitted in Nov 2023 for a syncopal episode thought to be due to orthostatic hypotension. Wife shares how he ran out of his Klonipin some time last week. He then had trouble sleeping and c/o trouble swallowing. He has a hx of jaw pain and only eats a soft diet. In the ED, he had a Head CT without an acute bleed. He was found to have DAVID and started on IVF's. He was agitated and confused in the ED. He kept trying to get out of bed. He was given Haldol 5mg IV, Ativan 1mg IV, and Zyprexa 10mg IM) and admitted to the med floor. He did not sleep last night per report. Around 6 AM, he pulled out IV and kept trying to get out of bed. He was given Haldol 2mg IV PRN. Currently, he feels tired and sleepy. He is oriented to self and month and year. He recalls feeling dizzy at home and having trouble swallowing. He endorses depressed mood, fatigue, and reduced motivation, but no hopeless or worthless feeling or suicidal ideation. Fair appetite. Poor sleep. He was using the Klonipin for sleep and NOT for anxiety. Weight has been stable at 117lb the past 2 yrs per chart review, consistent with wife's report to me. Psychiatry Review Systems: No voices or visions. No elevated mood, racing thoughts, decreased need for sleep, grandiose thoughts. Past Psychiatric History: None    Substance Use History: None    Social and Developmental History: Retired .  with 1 son. He lives with his wife.      Past Medical History:   Diagnosis Date    Abdominal distention     Abdominal pain     Anemia     Back pain     Back problem     back and neck pain    Black stools     Bladder incontinence     Bloating     Constipation     Diarrhea, unspecified     Disorder of prostate     Dizziness     Esophageal reflux     Exposure to TB     Fatigue     Flatulence/gas pain/belching     Frequent use of laxatives Hearing loss     Heart palpitations     Hemorrhoids     High blood pressure     High cholesterol     Hoarseness, chronic     Indigestion 3/1/2018    Irregular bowel habits     Leaking of urine     Loss of appetite     Other and unspecified hyperlipidemia     PAC (premature atrial contraction) 1/22/2015    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    Problems with swallowing     Sleep disturbance     Stool incontinence     Unspecified disorder of thyroid     Unspecified essential hypertension     Unspecified gastritis and gastroduodenitis without mention of hemorrhage     Unspecified hypothyroidism     Weight loss      Past Surgical History:   Procedure Laterality Date    BACK SURGERY  5/22/14    C4-C7 ACDF     CATARACT      COLONOSCOPY  7/5/11    COLONOSCOPY      FLUOR [address] [place] NDL/CATH SPI DX/THER NJX  9/28/2012    Procedure: TRANSFORAMINAL EPIDURAL - LUMBAR;  Surgeon: Flaquito Veras MD;  Location: [facility] [address] [place] NDL/CATH SPI DX/THER Amber Alias  4/28/2014    Procedure: INTRATHECAL PUMP TRIAL;  Surgeon: Flaquito Veras MD;  Location: [facility] [address] [place] NDL/CATH SPI DX/THER Amber Alias N/A 1/15/2015    Procedure: INTRATHECAL PUMP TRIAL;  Surgeon: Flaquito Veras MD;  Location: [address] [place] [address]  2014    INJECTION, ANESTHETIC/STEROID, TRANSFORAMINAL EPIDURAL; LUMBAR/SACRAL, SINGLE LEVEL  9/7/2012    Procedure: TRANSFORAMINAL EPIDURAL - LUMBAR;  Surgeon: Pawel Morrison MD;  Location: [facility]    INJECTION, ANESTHETIC/STEROID, TRANSFORAMINAL EPIDURAL; LUMBAR/SACRAL, SINGLE LEVEL  4/2/2013    Procedure: TRANSFORAMINAL EPIDURAL - LUMBAR;  Surgeon: Flaquito Veras MD;  Location: [facility]    INJECTION, W/WO CONTRAST, DX/THERAPEUTIC SUBSTANCE, EPIDURAL/SUBARACHNOID; LUMBAR/SACRAL  9/28/2012    Procedure: TRANSFORAMINAL EPIDURAL - LUMBAR;  Surgeon: Flaquito Veras MD; Location: [facility] FOR PAIN MA[facility]    INJECTION, W/WO CONTRAST, DX/THERAPEUTIC SUBSTANCE, EPIDURAL/SUBARACHNOID; LUMBAR/SACRAL  4/28/2014    Procedure: INTRATHECAL PUMP TRIAL;  Surgeon: Enedina Polnaco MD;  Location: [facility] PAIN MANAGEMENT    INJECTION, W/WO CONTRAST, DX/THERAPEUTIC SUBSTANCE, EPIDURAL/SUBARACHNOID; LUMBAR/SACRAL N/A 1/15/2015    Procedure: INTRATHECAL PUMP TRIAL;  Surgeon: Enedina Polanco MD;  Location: [address] [place] [address]  6/09    cataract both eyes 2 weeks apart    M-SEDAJ BY  PHYS PERFRMG SVC 5+ YR  9/7/2012    Procedure: TRANSFORAMINAL EPIDURAL - LUMBAR;  Surgeon: Neris Mayers MD;  Location: [address]    M-SEDA BY  PHYS [address]  N SVC 5+ YR  12/18/2013    Procedure: STIMULATOR TRIAL EPIDURAL LEAD;  Surgeon: Enedina Polanco MD;  Location: [address] [facility]    M-SEDA BY  PHYS [id number]  N SVC 5+ YR  2/3/2014    Procedure: STIMULATOR TRIAL PERIPHERAL;  Surgeon: Enedina Polanco MD;  Location: [address] [facility]    M-SEDA BY  PHYS [id number]  N SVC 5+ YR  4/28/2014    Procedure: INTRATHECAL PUMP TRIAL;  Surgeon: Enedina Polanco MD;  Location: [facility] PAIN MANAGEMENT    NEEDLE BIOPSY LIVER      ORTHOPEDIC SURG (PBP)  5/2013 8/2013    Lumbar 4 - 5     OTHER  08    esophagogastroduodenoscopy    OTHER      jaw fracture/repair, hardware remains    PATIENT DOCUMENTED NOT TO HAVE EXPERIENCED ANY OF THE FOLLOWING EVENTS  4/2/2013    Procedure: TRANSFORAMINAL EPIDURAL - LUMBAR;  Surgeon: Enedina Polanco MD;  Location: [address] [place] [address] Dr PAIN MANAGEMENT    PATIENT DOCUMENTED NOT TO HAVE EXPERIENCED ANY OF THE FOLLOWING EVENTS  12/18/2013    Procedure: STIMULATOR TRIAL EPIDURAL LEAD;  Surgeon: Enedina Polanco MD;  Location: [facility] FOR PAIN MANAGEMENT    PATIENT DOCUMENTED NOT TO HAVE EXPERIENCED ANY OF THE FOLLOWING EVENTS  2/3/2014    Procedure: STIMULATOR TRIAL PERIPHERAL;  Surgeon: Enedina Polanco MD; Location: [facility] PAIN MANAGEMENT    PATIENT DOCUMENTED NOT TO HAVE EXPERIENCED ANY OF THE FOLLOWING EVENTS  4/28/2014    Procedure: INTRATHECAL PUMP TRIAL;  Surgeon: Dinorah Mills MD;  Location: [address] [place] [address]    PATIENT DOCUMENTED NOT TO HAVE EXPERIENCED ANY OF THE FOLLOWING EVENTS N/A 1/15/2015    Procedure: INTRATHECAL PUMP TRIAL;  Surgeon: Dinorah Mills MD;  Location: [address] [place] [address]    PATIENT WITH PREOPERATIVE ORDER FOR IV ANTIBIOTIC SURGICAL SITE INFECT  2/3/2014    Procedure: STIMULATOR TRIAL PERIPHERAL;  Surgeon: Dinorah Mills MD;  Location: [address] [place] [address]    PATIENT [address] [place] [address] FOR IV ANTIBIOTIC SURGICAL SITE INFECTION PROPHYLAXIS.  4/2/2013    Procedure: TRANSFORAMINAL EPIDURAL - LUMBAR;  Surgeon: Dinorah Mills MD;  Location: [facility] PAIN MANAGEMENT    PATIENT [address] [place] [address] FOR IV ANTIBIOTIC SURGICAL SITE INFECTION PROPHYLAXIS. 12/18/2013    Procedure: STIMULATOR TRIAL EPIDURAL LEAD;  Surgeon: Dinorah Mills MD;  Location: [address] [place] [address]    PATIENT [address] [place] [address] FOR IV ANTIBIOTIC SURGICAL SITE INFECTION PROPHYLAXIS.  4/28/2014    Procedure: INTRATHECAL PUMP TRIAL;  Surgeon: Dinorah Mills MD;  Location: [facility] PAIN MANAGEMENT    PATIENT [address] [place] [address] FOR IV ANTIBIOTIC SURGICAL SITE INFECTION PROPHYLAXIS.  N/A 1/15/2015    Procedure: INTRATHECAL PUMP TRIAL;  Surgeon: Dinorah Mills MD;  Location: [address] [place] [address] [facility]    PERCUT IMPLNT [address] [facility]  2/3/2014    Procedure: STIMULATOR TRIAL PERIPHERAL;  Surgeon: Dinorah Mills MD;  Location: [address] [place] [address] [facility]    PERCUT IMPLNT [address] [facility]  2/3/2014    Procedure: STIMULATOR TRIAL PERIPHERAL;  Surgeon: Dinorah Mills MD;  Location: [address] [place] [address] [facility]    PERCUT IMPLNT [address] [facility]  2/3/2014    Procedure: STIMULATOR TRIAL PERIPHERAL;  Surgeon: Dinorah Mills MD; Location: [address] [place] [address] [facility]    PERCUT IMPLNT [address] [facility]  2/3/2014    Procedure: STIMULATOR TRIAL PERIPHERAL;  Surgeon: Elin Encinas MD;  Location: [address] [place] [address] [facility]    PERCUT IMPLNT [address]  12/18/2013    Procedure: STIMULATOR TRIAL EPIDURAL LEAD;  Surgeon: Elin Encinas MD;  Location: [address] [place] [address] [facility]    PERCUT IMPLNT [address] [facility]  12/18/2013    Procedure: STIMULATOR TRIAL EPIDURAL LEAD;  Surgeon: Elin Encinas MD;  Location: [facility] FOR PAIN MANAG[facility]    SIGMOIDOSCOPY,DIAGNOSTIC      SPINE SURGERY PROCEDURE UNLISTED      CERVICAL FUSION X3/HARDWARE, lumbar 4-5 fusion     TONSILLECTOMY      UPPER GI ENDOSCOPY,EXAM       Family History   Problem Relation Age of Onset    Hypertension Father     Heart Disorder Father     Hypertension Mother     Heart Disorder Mother       reports that he has never smoked. He has never used smokeless tobacco. He reports that he does not drink alcohol and does not use drugs. Allergies:   Allergies   Allergen Reactions    Neomycin SWELLING and PAIN     Polymyxin-bacitracin OINT, local swelling at site, ear swelling    Neomycin PAIN and SWELLING     Polymyxin-bacitracin OINT, local swelling at site, ear swelling    Bactrim [Sulfamethoxazole W/Trimethoprim]      Dry mouth and stomach cramping    Sulfa Antibiotics     Versed      Dizzy     Flonase [Fluticasone Propionate] FATIGUE     SUSP       Medications:    Current Facility-Administered Medications:     albuterol (Ventolin HFA) 108 (90 Base) MCG/ACT inhaler 2 puff, 2 puff, Inhalation, Q6H PRN    artificial saliva substitute (Biotene) oral solution 10 mL, 10 mL, Oral, TID    atorvastatin (Lipitor) tab 10 mg, 10 mg, Oral, Nightly    clonazePAM (KlonoPIN) tab 2 mg, 2 mg, Oral, Nightly    fentaNYL (Duragesic) 25 MCG/HR patch 1 patch, 1 patch, Transdermal, Q3 Days    levothyroxine (Synthroid) tab 100 mcg, 100 mcg, Oral, Daily    melatonin tab 6 mg, 6 mg, Oral, Nightly PRN    pantoprazole (Protonix) DR tab 40 mg, 40 mg, Oral, Before breakfast    melatonin tab 3 mg, 3 mg, Oral, Nightly PRN    ondansetron (Zofran) 4 MG/2ML injection 4 mg, 4 mg, Intravenous, Q6H PRN    metoclopramide (Reglan) 5 mg/mL injection 5 mg, 5 mg, Intravenous, Q8H PRN    sodium chloride 0.9% infusion, , Intravenous, Continuous    heparin (Porcine) 5000 UNIT/ML injection 5,000 Units, 5,000 Units, Subcutaneous, Q8H JOSE    acetaminophen (Tylenol Extra Strength) tab 500 mg, 500 mg, Oral, Q4H PRN    polyethylene glycol (PEG 3350) (Miralax) 17 g oral packet 17 g, 17 g, Oral, Daily PRN    sennosides (Senokot) tab 17.2 mg, 17.2 mg, Oral, Nightly PRN    bisacodyl (Dulcolax) 10 MG rectal suppository 10 mg, 10 mg, Rectal, Daily PRN    guaiFENesin (Robitussin) 100 MG/5 ML oral liquid 200 mg, 200 mg, Oral, Q4H PRN    glycerin-hypromellose- (Artifical Tears) 0.2-0.2-1 % ophthalmic solution 1 drop, 1 drop, Both Eyes, QID PRN    sodium chloride (Saline Mist) 0.65 % nasal solution 1 spray, 1 spray, Each Nare, Q3H PRN    haloperidol lactate (Haldol) 5 MG/ML injection 2 mg, 2 mg, Intravenous, Q6H PRN    Review of Systems   Constitutional:  Positive for malaise/fatigue.      Mental Status Exam:     Objective      Vitals:    12/12/23 0351   BP:    Pulse: 114   Resp:    Temp:      Appearance: fair grooming, in no acute distress  Behavior: normal psychomotor  Attitude: cooperative  Gait: not observed    Speech: normal rate and rhythm and soft    Mood: depressed  Affect: Congruent and Restricted    Thought process: logical  Thought content: no delusions  Perceptions: no hallucinations  Associations: Intact    Orientation: person, month, year  Attention and Concentration: poor  Memory:  intact remote and impaired recent  Language: Intact naming and repetition  Fund of Knowledge: Able to recite name of [place] president    Insight: limited  Judgment: limited    Laboratory Data:  Lab Results   Component Value Date    WBC 6.0 12/12/2023    HGB 11.5 12/12/2023    HCT 33.8 12/12/2023    .0 12/12/2023    CREATSERUM 1.08 12/12/2023    BUN 11 12/12/2023     12/12/2023    K 3.8 12/12/2023     12/12/2023    CO2 28.0 12/12/2023    GLU 87 12/12/2023    CA 8.6 12/12/2023    ALB 3.8 12/11/2023    ALKPHO 87 12/11/2023    BILT 1.9 12/11/2023    TP 6.9 12/11/2023    AST 23 12/11/2023    ALT 16 12/11/2023         Wt Readings from Last 6 Encounters:   12/12/23 53.1 kg (117 lb)   11/19/23 54 kg (119 lb 0.8 oz)   05/08/23 53.1 kg (117 lb)   10/20/22 53.1 kg (117 lb)   01/04/22 53.1 kg (117 lb)   12/26/21 53.1 kg (117 lb)

## 2023-12-12 NOTE — PROGRESS NOTES
PSYCH CONSULT    Date of Admission: 12/11/23  Date of Consult: 12/12/23  Reason for Consultation: Altered mental status, eval for mood disorder    Impression:  Primary Psychiatric Diagnosis:  Acute delirium/encephalopathy due to DAVID/dehydration and sleep deprivation and possibly mild benzo withdrawal (ran out of Klonipin last week per hx from wife) and then meds given in ED (Haldol 5mg IV, Ativan 1mg IV, Zyprexa 10mg IM). Depressive disorder unspecified. Chronic back pain syndrome on chronic opioids (fentanyl 25mcg patch). No hx of forgetfulness or cognitive impairment per hx from wife. Pertinent Medical Diagnoses:  DAVID. HLD. Hypothyroidism. GERD. Recommendations:  1) Decrease Klonipin from 2mg to 1mg nightly with a plan to taper off.    2) Start Remeron 7.5mg nightly to help with insomnia and low appetite and depressive disorder. 3) Will need speech/swallow eval once he is more alert. He c/o dysphagia in the ED. Wife told me that he hurt his jaw and can only tolerate a soft diet. 4) Check serum B12 in AM.     5) As his delirium improves, will discuss recommendation to see an individual psychotherapist.     Full note to follow.     Dr. Alem Rabago

## 2023-12-12 NOTE — SLP NOTE
ADULT SWALLOWING EVALUATION    ASSESSMENT    ASSESSMENT/OVERALL IMPRESSION:  Order received for BSE to r/o aspiration. Patient presented to ED s/p fall and increased dizziness. Was agitated and combative overnight but calm at this time. Seen by psych and diagnosed with acute delirium/encephalopathy due to DAVID/dehydration, sleep deprivation, and medication related effects. Patient known to this service from previous swallow eval last time in 2021 with recommendation for puree diet, thin liquids at that time. Patient with PMHx including ACDF 2014, broken jaw s/p fall in 2015, and \"narrow esophagus\" per patient report. Patient endorsed consuming a primarily puree diet with thin liquids at home. Patient received awake, alert, appeared fidgeting with table-side items. Patient with clear speech and vocal quality. Patient reported choking when drinking water from a straw yesterday. Patient with no focal oral motor deficits appreciated within cursory exam. Patient assisted with PO trials at first, and then able to carryover for diet texture analysis. Time spent with patient on education/training in recommended strategies for swallowing and aspiration precautions. Patient exhibited no overt clinical s/s aspiration within controlled conditions of this evaluation. Recommend slow rate, and small bite or sip at a time while in upright position. Recommend puree diet, thin liquids with no straws. Recommend follow-up with GI service if unable to tolerate PO diet. Time spent with patient on diet texture analysis and he tolerated multiple trials of puree and thin liquid consistencies with no overt clinical s/s aspiration or difficulty. RECOMMENDATIONS   Diet Recommendations - Solids: Puree  Diet Recommendations - Liquids: Thin Liquids (No Straws)  Compensatory Strategies Recommended: No straws; Slow rate;Small bites and sips  Aspiration Precautions: Upright position; Slow rate;Small bites and sips; No straw  Medication Administration Recommendations: Crushed in puree; One pill at a time  Treatment Plan/Recommendations: Aspiration precautions;GI consult       HISTORY   MEDICAL HISTORY  Reason for Referral: RN dysphagia screen;R/O aspiration    Problem List  Principal Problem:    DAVID (acute kidney injury) ([facility] [address]  Active Problems:    Pure hypercholesterolemia    Chronic pain    BPH (benign prostatic hyperplasia)    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    Essential hypertension    Gastroesophageal reflux disease without esophagitis    Hypothyroidism    Esophageal dysphagia    Gait instability    Delirium      Past Medical History  Past Medical History:   Diagnosis Date    Abdominal distention     Abdominal pain     Anemia     Back pain     Back problem     back and neck pain    Black stools     Bladder incontinence     Bloating     Constipation     Diarrhea, unspecified     Disorder of prostate     Dizziness     Esophageal reflux     Exposure to TB     Fatigue     Flatulence/gas pain/belching     Frequent use of laxatives     Hearing loss     Heart palpitations     Hemorrhoids     High blood pressure     High cholesterol     Hoarseness, chronic     Indigestion 3/1/2018    Irregular bowel habits     Leaking of urine     Loss of appetite     Other and unspecified hyperlipidemia     PAC (premature atrial contraction) 1/22/2015    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 \"    SWALLOWING HISTORY  Current Diet Consistency: NPO  Dysphagia History: as per above  Imaging Results: CT brain: negative    OBJECTIVE   ORAL MOTOR EXAMINATION     Symmetry: Within Functional Limits  Strength: Within Functional Limits  Tone: Within Functional Limits  Range of Motion: Within Functional Limits       Voice Quality: Clear  Respiratory Status: Unlabored  Consistencies Trialed: Thin liquids;Puree  Method of Presentation: Self presentation;Staff/Clinician assistance  Patient Positioning: Upright;Midline    Oral Phase of Swallow: Within Functional Limits     Pharyngeal Phase of Swallow: Within Functional Limits           (Please note: Silent aspiration cannot be evaluated clinically. Videofluoroscopic Swallow Study is required to rule-out silent aspiration.)    Esophageal Phase of Swallow: Complaints consistent with possible esophageal involvement            GOALS  Goal #1 The patient will tolerate puree consistency and thin liquids without overt signs or symptoms of aspiration with 90 % accuracy over 1-2 session(s). In Progress   Goal #2 The patient/family/caregiver will demonstrate understanding and implementation of aspiration precautions and swallow strategies independently over 1-2 session(s).     In Progress     FOLLOW UP  Treatment Plan/Recommendations: Aspiration precautions;GI consult     Follow Up Needed (Documentation Required): Yes  SLP Follow-up Date: 12/13/23    Thank you for your referral.   If you have any questions, please contact Alfredo Carolina, [address] [facility], earl Jose Alfredo [address] [facility]-SLP/L, pager [phone]  Speech-Language Pathologist

## 2023-12-12 NOTE — ED QUICK NOTES
Orders for admission, patient is aware of plan and ready to go upstairs. Any questions, please call ED RN Rina Chavez at extension [phone].      Patient Covid vaccination status: Fully vaccinated     COVID Test Ordered in ED: None    COVID Suspicion at Admission: N/A    Running Infusions:  None    Mental Status/LOC at time of transport: AOX2-3    Other pertinent information: Pt being admitted for unsteady gait and DAVID  CIWA score: N/A   NIH score:  N/A

## 2023-12-12 NOTE — ED QUICK NOTES
Patients son, Kathy Quan, to bedside. Plan of care discussed with Jono at this time; no questions at this time.

## 2023-12-12 NOTE — PLAN OF CARE
Pt A/O x2. VSS. Afebrile. Pt denies pain throughout day. Medications admin per MAR. Pt confused throughout day. Pt restless at times, reorientation provided. MD notified. Pt reported hx trouble swallowing upon admission, SLP consulted. Pt okay to have regular diet, pureed w/ thin liquids and no straws. Psych consulted. Pt's wife updated on POC. Fall and safety precautions in place. Call light within reach.

## 2023-12-12 NOTE — ED QUICK NOTES
Pt brought back to room by volunteer, pt came up to nurses station stating\"he's tired of games: almost fell at nursing station. Had to be held up by tech to prevent from falling. Brought bed to hallway to get patient safely in bed. Pt is refusing to let us put him in a gown. MD at bedside. Pt has complaints of dizziness and pain. Pt was recently admitted for syncope. Pt reported to ER MD that he fell and hit his head. Pt not safe to leave at this time. Son and wife called per patient request, did not answer, voicemail left.

## 2023-12-12 NOTE — ED QUICK NOTES
Pt being very combative, not following instructions, removing all monitoring equipment, being aggressive to staff. Public safety called and at bedside.

## 2023-12-12 NOTE — PROGRESS NOTES
NURSING ADMISSION NOTE    Patient is A/O x 2-3 with frequent episodes of confusion, kept getting out of bed. High fall risk, unsteady gait with moderate back pain. Reported difficulty swallowing and had not ate. ST to eval - able to tolerate ice cream and apple sauce- coughs on water, fall and aspiration prec in place. MD notified for med rec and navigator completed. Old fentanyl patch removed, new patch applied to right anterior chest. Needs addressed. Call light placed within reach. VSS and afebrile. IVF infusing. 0890 Patient continue to attempt getting out of bed frequently - very unstable, attempted to pull IV with on and off confusion. Did not sleep overnight. MD gave order for haldol as needed, now sleeping but easily to arouse. VSS. Patient admitted via [address] to room. Safety precautions initiated. Bed in low position. Call light in reach.

## 2023-12-13 ENCOUNTER — APPOINTMENT (OUTPATIENT)
Dept: GENERAL RADIOLOGY | Facility: HOSPITAL | Age: 83
End: 2023-12-13
Attending: INTERNAL MEDICINE
Payer: MEDICARE

## 2023-12-13 LAB — VIT B12 SERPL-MCNC: 1085 PG/ML (ref 193–986)

## 2023-12-13 PROCEDURE — 99232 SBSQ HOSP IP/OBS MODERATE 35: CPT | Performed by: INTERNAL MEDICINE

## 2023-12-13 PROCEDURE — 74018 RADEX ABDOMEN 1 VIEW: CPT | Performed by: INTERNAL MEDICINE

## 2023-12-13 PROCEDURE — 99232 SBSQ HOSP IP/OBS MODERATE 35: CPT | Performed by: OTHER

## 2023-12-13 NOTE — CM/SW NOTE
12/13/23 1200   CM/SW Referral Data   Referral Source Social Work (self-referral)   Reason for Referral Discharge planning   Informant Patient;Spouse/Significant Other   Patient [address]   Patient lives with Spouse/Significant other   Discharge Needs   Anticipated D/C needs To be determined     SW met with patient at bedside to discuss DC planning. Patient reported that he a little disoriented and would like his wife involved on the call. SW and patient called his wife together on speakerphone. Patient's wife Vinod Donato confirmed that patient lives at home with her and they both have a hard time getting around the home. Vinod Donato is current with [facility] and would like [facility] arranged for patient as well if he needs HH. Fabienne's preference however is for patient to go to rehab after DC. Reviewed Medicare coverage for LEXX including need for medically necessary 3 midnight inpatient hospital stay. Pt was made inpatient status on 12/13/2023 and would need to have a medical need to remain in the hospital until 12/16/2023 in order to have Medicare coverage for LEXX. If pt does not meet this criteria, pt could elect to pay privately for NH. Patient's wife reported that she is aware patient will need 3 inpatient midnights and would like referrals sent to the [facility], [facility] and [facility]. She is okay with potentially having to private pay if needed. SW sent referral to the listed facilities and will continue to follow for PT evaluation. SW will continue to follow for plan of care changes and remain available for any additional DC needs or concerns.      Inis Delay SONJA, [facility]  Discharge Planner   [phone]

## 2023-12-13 NOTE — PLAN OF CARE
Patient alert and oriented x2, calm but confused. Afebrile. Room air. No SOB. No complaints of pain. Able to swallow PO meds with apple sauce. On Regular Pureed diet, Thin Liquid without straw per Speech Language Pathologist. Fall and Aspiration precautions in place. Frequent rounds provided. Call light in each. Needs attended.     Problem: METABOLIC/FLUID AND ELECTROLYTES - ADULT  Goal: Hemodynamic stability and optimal renal function maintained  Description: INTERVENTIONS:  - Monitor labs and assess for signs and symptoms of volume excess or deficit  - Monitor intake, output and patient weight  - Monitor urine specific gravity, serum osmolarity and serum sodium as indicated or ordered  - Monitor response to interventions for patient's volume status, including labs, urine output, blood pressure (other measures as available)  - Encourage oral intake as appropriate  - Instruct patient on fluid and nutrition restrictions as appropriate  Outcome: Progressing     Problem: Impaired Swallowing  Goal: Minimize aspiration risk  Description: Interventions:  - Patient should be alert and upright for all feedings (90 degrees preferred)  - Offer food and liquids at a slow rate  - No straws  - Encourage small bites of food and small sips of liquid  - Offer pills one at a time, crush or deliver with applesauce as needed  - Discontinue feeding and notify MD (or speech pathologist) if coughing or persistent throat clearing or wet/gurgly vocal quality is noted  Outcome: Progressing

## 2023-12-13 NOTE — PLAN OF CARE
Pt received A&Ox1, awoke confused but pleasant. Upon reassessment this afternoon pt A&Ox4. Afebrile. VSS - orthostatic vitals (+), Facundo PARKINSON aware. C/o mild back and abd pain. XRA completed. Declining pain meds. Seen by Dr. Ten Major - plan to monitor pt another 24 hours. SW following regarding dc planning. IVF infusing @ 100ml/hr. Fall precautions in place. Call light in reach.

## 2023-12-14 VITALS
SYSTOLIC BLOOD PRESSURE: 135 MMHG | RESPIRATION RATE: 18 BRPM | OXYGEN SATURATION: 98 % | TEMPERATURE: 98 F | HEART RATE: 79 BPM | WEIGHT: 117 LBS | DIASTOLIC BLOOD PRESSURE: 82 MMHG | BODY MASS INDEX: 17 KG/M2

## 2023-12-14 PROCEDURE — 99239 HOSP IP/OBS DSCHRG MGMT >30: CPT | Performed by: INTERNAL MEDICINE

## 2023-12-14 PROCEDURE — 99232 SBSQ HOSP IP/OBS MODERATE 35: CPT | Performed by: OTHER

## 2023-12-14 RX ORDER — MIRTAZAPINE 7.5 MG/1
7.5 TABLET, FILM COATED ORAL NIGHTLY
Qty: 30 TABLET | Refills: 0 | Status: SHIPPED | OUTPATIENT
Start: 2023-12-14 | End: 2023-12-21

## 2023-12-14 RX ORDER — CLONAZEPAM 1 MG/1
TABLET ORAL
Qty: 9 TABLET | Refills: 0 | Status: SHIPPED | OUTPATIENT
Start: 2023-12-14 | End: 2023-12-21

## 2023-12-14 RX ORDER — CLONAZEPAM 1 MG/1
TABLET ORAL
Qty: 9 TABLET | Refills: 0 | Status: SHIPPED | OUTPATIENT
Start: 2023-12-14 | End: 2023-12-14

## 2023-12-14 NOTE — DISCHARGE INSTRUCTIONS
Sometimes managing your health at home requires assistance. The [facility] team has recognized your preference to use [facility]. They can be reached by phone at [phone]. The fax number for your reference is [phone]. . A representative from the home health agency will contact you or your family to schedule your first visit. Medicare Referrals for Psychiatry and [facility]  [address]  [facility] [address] [facility], [address]  [phone]    [facility]  Multiple: [address] [facility], [facility] -or- [address], [facility] B, [facility] - [address], [address], [facility]- [address], [place], [place]  [address]  [facility] [address], [place], [place]  [phone]    [facility]  [address] [facility] [facility], [facility] [place], [place]  [phone]    [id number], [facility]  [address] [facility], [facility] [phone], [place], [place]  [phone]    [address] [place] [address] Locations: [facility], and Sanket Aguilera Locations: [place], [place], [place] and [address]    Counseling Works   [address] [facility], [address] [place] [address], [place], [place]  [phone]

## 2023-12-14 NOTE — PLAN OF CARE
Patient more alert and oriented. Calm and cooperative with care. Afebrile. Room air. No SOB. No complaints of pain. Denies nausea and vomiting. Maintained on Pureed diet and Thin liquid without straw. Voiding via urinal. Up with assist and walker. Fall and Aspiration precautions in place. Call light in reach. Needs attended.      Problem: METABOLIC/FLUID AND ELECTROLYTES - ADULT  Goal: Hemodynamic stability and optimal renal function maintained  Description: INTERVENTIONS:  - Monitor labs and assess for signs and symptoms of volume excess or deficit  - Monitor intake, output and patient weight  - Monitor urine specific gravity, serum osmolarity and serum sodium as indicated or ordered  - Monitor response to interventions for patient's volume status, including labs, urine output, blood pressure (other measures as available)  - Encourage oral intake as appropriate  - Instruct patient on fluid and nutrition restrictions as appropriate  Outcome: Progressing     Problem: Impaired Swallowing  Goal: Minimize aspiration risk  Description: Interventions:  - Patient should be alert and upright for all feedings (90 degrees preferred)  - Offer food and liquids at a slow rate  - No straws  - Encourage small bites of food and small sips of liquid  - Offer pills one at a time, crush or deliver with applesauce as needed  - Discontinue feeding and notify MD (or speech pathologist) if coughing or persistent throat clearing or wet/gurgly vocal quality is noted  Outcome: Progressing

## 2023-12-14 NOTE — CDS QUERY
Bee Wall   Dear Dr Mirta Grant:  The medical record includes documentation of a diagnosis of acute encephalopathy. Based on your medical judgment and the clinical indicators (included below), can you please further specify the diagnosis of Encephalopathy    [  x] Acute Metabolic Encephalopathy due to DAVID/dehydration and sleep deprivation and possibly mild benzo withdrawal   [   ] Other Encephalopathy (please specify):________  [  ] Other (please specify) ___________________       DOCUMENTATION FROM THE MEDICAL RECORD:  Clinical Indicators/potential risk:  ___12/11 Patient to ER with dizziness and falls. ___ER Physician's Note- On arrival to his assigned ER bed the patient is very combative and also appears confused. On arrival here patient hypertensive but otherwise afebrile hemodynamically stable. However patient significantly agitated and upset attempting to get out of bed nearly collapsing and falling several times. Certain statements made by patient appeared nonsensical. Several attempts to verbally redirect the patient by myself and other staff members were made. Patient ultimately required Ativan, Haldol and olanzapine. Patient is more calm and conversive now. His son is here and states that the patient has not eaten in the last 2 to 3 days. He believes he may have some to do with the patient being out of his Klonopin. Apparently patient's PCP had requested patient come to the office for evaluation before refilling. Mild hyponatremia noted. ___H&P: AMS, acute renal failure  ___12/13: Psychiatry note: Primary Psychiatric Diagnosis: Acute delirium/encephalopathy due to DAVID/dehydration and sleep deprivation and possibly mild benzo withdrawal (ran out of Klonopin last week per hx from wife) and then meds given at [facility] (Haldol 5mg IV, Ativan 1mg IV, Zyprexa 10mg IM, Haldol 2mg IV).  IMPROVING.  ___12/13 Hospitalist Note: Acute encephalopathy w/ agitation, improved    Treatment: CT brain, Haldol, Ativan, Zyprexa, Olanzapine. Psychiatry consult. For questions regarding this query, please contact: Clinical  Misti Hummel [phone]. Jake [email or web address]".[employer].   THIS FORM IS A PERMANENT PART OF THE MEDICAL RECORD

## 2023-12-14 NOTE — DISCHARGE SUMMARY
[facility] HOSPITALIST  DISCHARGE SUMMARY     Hilaria Zuleta Patient Status:  Inpatient    3/8/1940 MRN [id number]   [facility] 4NW-A Attending Agustín Renteria MD   Hosp Day # 1 PCP Albania Stephens MD     Date of Admission: 2023  Date of Discharge:   2023     Discharge Disposition: Home w/ [facility]    Discharge Diagnosis:  # Acute encephalopathy w/ agitation due to DAVID/dehydration and sleep deprivation and possibly mild benzo withdrawal, resolved  # Chronic benzodiazepine use   # Dizziness, h/o falls, FTT  # Acute renal failure 2/2 dehydration, improved  # Dehydration w/ Moderate protein calorie malnutrition  #Periumbilical abdominal pain, resolved  # H/o dysphagia prior esophageal stricture s/p balloon dilation in   # Chronic back/neck pain  # Hypothyroidism  # GERD  # Hyperlipidemia  # Chronic jaw pain     History of Present Illness:   Codey Ayala is a 78year old male with history of GERD, chronic pain, hypothyroidism, hyperlipidemia presents to the emergency room with reports of dizziness having some falls. Patient also states that he has a sore throat. No cough, fevers, chills. Patient denies any nausea or vomiting. Patient states that he took some cough drops and that helped his sore throat. Patient throughout his stay in the ER has been very combative and is very confused. Patient got a dose of Haldol in the ER which did make him a little more somnolent. Brief Synopsis: Patient is a 79 yo male who presented with dizziness/falls/confusion. He is on chronic opiates, benzodiazepines. He recently ran out of NIMBOXX per family. He has had longstanding poor oral intake, malnutrition. He was admitted for further workup and continued on IV fluids. He required as needed IV mgmt for agitation. Psychiatry was consulted. Patient clonazepam dose reduced and started on Remeron. We encourage p.o. intake. PT OT evaluated.   Social work coordinating new some home health care. Mental status significantly improved. He will continue on klonopin taper per psych and was given referrals for outpatient psych follow-up. He was discharged home once cleared by consultants. Lace+ Score: 67  59-90 High Risk  29-58 Medium Risk  0-28   Low Risk       TCM Follow-Up Recommendation:  LACE > 58: High Risk of readmission after discharge from the hospital.  **Certification    Admission date was 12/11/2023. Inpatient stay was shorter than expected. Patient's DAVID (acute kidney injury) ([facility] [address] was initially serious enough to expect a more lengthy hospitalization but patient improved faster than expected. Procedures during hospitalization: none    Consultants:  Psychiatry        Discharge Medications        START taking these medications        Instructions Prescription details   mirtazapine 7.5 MG Tabs  Commonly known as: Remeron      Take 1 tablet (7.5 mg total) by mouth nightly. Quantity: 30 tablet  Refills: 0            CHANGE how you take these medications        Instructions Prescription details   clonazePAM 1 MG Tabs  Commonly known as: KlonoPIN  What changed:   medication strength  how much to take  how to take this  when to take this  additional instructions      Take 1 tab nightly x 6 days, then 1/2 tab nightly x 6 days, then stop. Quantity: 9 tablet  Refills: 0            CONTINUE taking these medications        Instructions Prescription details   acetaminophen 500 MG Tabs  Commonly known as: Tylenol Extra Strength      Take 1 tablet (500 mg total) by mouth every 6 (six) hours as needed for Pain. Refills: 0     albuterol 108 (90 Base) MCG/ACT Aers  Commonly known as: Ventolin HFA      Inhale 2 puffs into the lungs every 6 (six) hours as needed for Wheezing. Refills: 0     artificial saliva substitute Soln      Take 10 mL by mouth in the morning, at noon, and at bedtime.    Refills: 0     atorvastatin 10 MG Tabs  Commonly known as: Lipitor      Take 1 tablet (10 mg total) by mouth nightly. Quantity: 30 tablet  Refills: 0     cholecalciferol 50 MCG (2000 UT) Caps  Commonly known as: Vitamin D3      Take 1 capsule (2,000 Units total) by mouth daily. Refills: 0     cyanocobalamin 1000 MCG/ML Soln  Commonly known as: Vitamin B12      Inject 1 mL (1,000 mcg total) into the muscle every 30 (thirty) days. Quantity: 3 mL  Refills: 9     docusate sodium 100 MG Caps  Commonly known as: Colace      Take 1 capsule (100 mg total) by mouth 2 (two) times daily. Refills: 0     fentaNYL 25 MCG/HR Pt72  Commonly known as: Duragesic      Place 1 patch onto the skin every 3 (three) days. Refills: 0     Insulin Syringe-Needle U-100 31G X 5/16\" 1 ML Misc  Commonly known as: BD Insulin Syringe Ultrafine      Use for B12 injection   Quantity: 10 each  Refills: 2     levothyroxine 100 MCG Tabs  Commonly known as: Synthroid      Take 1 tablet (100 mcg total) by mouth daily. Quantity: 90 tablet  Refills: 1     Melatonin 10 MG Caps      Take 6 mg by mouth nightly as needed. Refills: 0     Naloxone HCl 4 MG/0.1ML Liqd      4 mg by Nasal route as needed. If patient remains unresponsive, repeat dose in other nostril 2-5 minutes after first dose. Quantity: 1 kit  Refills: 0     ondansetron 4 mg tablet  Commonly known as: Zofran      Take 1 tablet (4 mg total) by mouth every 8 (eight) hours as needed for Nausea. Refills: 0     pantoprazole 40 MG Tbec  Commonly known as: Protonix      Take 1 tablet (40 mg total) by mouth before breakfast.   Quantity: 30 tablet  Refills: 0     psyllium 28 % Pack  Commonly known as: Metamucil      Take 1 packet by mouth 2 (two) times daily. Quantity: 15 packet  Refills: 0     sennosides 8.6 MG Tabs  Commonly known as: Senokot      Take 2 tablets (17.2 mg total) by mouth daily as needed (constipation).    Refills: 0               Where to Get Your Medications        These medications were sent to [facility]/pharmacy #[id number]- [place], [place] - [address]  [facility], [phone], [address]      Phone: [phone]   clonazePAM 1 MG Tabs  mirtazapine 7.5 MG Tabs         [place]    Follow-up appointment:   Anali Husain MD  Katina Lebron [address] [facility]  [phone]    Schedule an appointment as soon as possible for a visit in 1 week(s)      Future Appointments   Date Time Provider Colby Gaitan   12/21/2023  3:00 PM Anali Husain MD [facility] [address] [facility] [address]       -----------------------------------------------------------------------------------------------  PATIENT DISCHARGE INSTRUCTIONS: See electronic chart    IGGY Bradley  12/14/2023   1:28 PM     Total time spent on discharge planning:  >30 minutes     Addendum:    Pt seen and examined, agree with above. Overall doing well and mentation back to baseline. No CP/SOB/abd pain, + chronic back pain. General: NAD  CVS: RRR  RS\" CTA  Abd: soft NTND  Neuro: intact    Labs and imaging reviewed  Plan as outlined above, additional changes made by me. DC planning. Ирина Lea MD      The 21st Century Cures Act makes medical notes like these available to patients in the interest of transparency. Please be advised this is a medical document. Medical documents are intended to carry relevant information, facts as evident, and the clinical opinion of the practitioner. The medical note is intended as peer to peer communication and may appear blunt or direct. It is written in medical language and may contain abbreviations or verbiage that are unfamiliar.

## 2023-12-14 NOTE — PLAN OF CARE
NURSING DISCHARGE NOTE    Discharged Home via Wheelchair. Accompanied by Support staff  Belongings Taken by patient/family. Pt received A&Ox4. Afebrile. VSS. C/o mild back pain. Seen by Dr. Stephen Obrien - [place] meds reconciled. Cleared to [place]. HH arranged per . MO instructions given to pt at beside and pt's wife Christopher Becker over phone. Instructed that Rx must be filled for dose tonight. Verbalized understanding. Pt transported by [facility]. PIV removed. 80 - Pt's wife called regarding psych counseling referrals.

## 2023-12-14 NOTE — PROGRESS NOTES
[facility]  Report of Psychiatric Progress Note    Stephanie Smith Patient Status:  Observation    3/8/1940 MRN [id number]   [facility] 4NW-A Attending Sahra Diez MD   Hosp Day # 3 PCP Nora Soriano MD     Date of Admission: 23  Date of Service: 23  Reason for Consultation: Altered mental status, eval for mood disorder    Impression:  Primary Psychiatric Diagnosis:  Acute delirium/encephalopathy due to DAVID/dehydration and sleep deprivation and possibly mild benzo withdrawal (ran out of Klonipin last week per hx from wife) and then meds given at THE [facility] (Haldol 5mg IV, Ativan 1mg IV, Zyprexa 10mg IM, Haldol 2mg IV). RESOLVED. Depressive disorder unspecified. Chronic back pain syndrome on chronic opioids (fentanyl 25mcg patch). No hx of forgetfulness or cognitive impairment per hx from wife. Pertinent Medical Diagnoses:  DAVID. HLD. Hypothyroidism. Hx esophageal stricture. GERD. Normal B12. Recommendations:  1) Klonipin taper- 1mg nightly x 6 days, then 0.5mg nightly x 6 days, then stop. 2) Continue Remeron 7.5mg nightly to help with insomnia and low appetite and depressive disorder. Started on 23. 3) Psych liaison placed referrals for psychiatrists in the DC instructions. He is open to seeing a psych med provider, but not a psychotherapist.     Perlita Dunn MD    History of Present Illness:  77 yo with hx of insomnia and chronic back pain on chronic opioids presented to the ED on 23 for c/o dizziness/lightheadedness/falls and difficultly swallowing. Per hx from wife, he has NO hx of forgetfulness or cognitive impairment. He has appeared depressed and more tired and apathetic to her the past several months. He has chronic back pain and is mostly sedentary. He uses a walker to ambulate at baseline. He has a hx of recent falls and dizziness. Recently admitted in 2023 for a syncopal episode thought to be due to orthostatic hypotension. Wife shares how he ran out of his Klonipin some time last week. He then had trouble sleeping and c/o trouble swallowing. He has a hx of jaw pain and only eats a soft diet. In the ED, he had a Head CT without an acute bleed. He was found to have DAVID and started on IVF's. He was agitated and confused in the ED. He kept trying to get out of bed. He was given Haldol 5mg IV, Ativan 1mg IV, and Zyprexa 10mg IM) and admitted to the med floor. He did not sleep last night per report. Around 6 AM, he pulled out IV and kept trying to get out of bed. He was given Haldol 2mg IV PRN. Currently, he feels tired and sleepy. He is oriented to self and month and year. He recalls feeling dizzy at home and having trouble swallowing. He endorses depressed mood, fatigue, and reduced motivation, but no hopeless or worthless feeling or suicidal ideation. Fair appetite. Poor sleep. He was using the Klonipin for sleep and NOT for anxiety. Weight has been stable at 117lb the past 2 yrs per chart review, consistent with wife's report to me. Interval Hx:  12/13/23- He slept last night and had NO agitation. Calm and cooperative per staff. He was disoriented to place and situation this AM. He felt like he didn't know who he was; but was able to tell me his name and birthday and address and wife's name and his past occupation. He feels tired and depressed, but has no worthless or hopeless feelings. He has no suicidal ideation. 12/14/23- He is oriented to self and hospital and month and year and situation. He is sleeping at night. He has mild depressed mood and denies anxiety or irritable mood. No hopelessness. He wants to go home. Psychiatry Review Systems: No voices or visions. No elevated mood, racing thoughts, decreased need for sleep, grandiose thoughts. Past Psychiatric History: None    Substance Use History: None    Social and Developmental History: Retired .  with 1 son.  He lives with his wife.     Past Medical History:   Diagnosis Date    Abdominal distention     Abdominal pain     Anemia     Back pain     Back problem     back and neck pain    Black stools     Bladder incontinence     Bloating     Constipation     Diarrhea, unspecified     Disorder of prostate     Dizziness     Esophageal reflux     Exposure to TB     Fatigue     Flatulence/gas pain/belching     Frequent use of laxatives     Hearing loss     Heart palpitations     Hemorrhoids     High blood pressure     High cholesterol     Hoarseness, chronic     Indigestion 3/1/2018    Irregular bowel habits     Leaking of urine     Loss of appetite     Other and unspecified hyperlipidemia     PAC (premature atrial contraction) 1/22/2015    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    Problems with swallowing     Sleep disturbance     Stool incontinence     Unspecified disorder of thyroid     Unspecified essential hypertension     Unspecified gastritis and gastroduodenitis without mention of hemorrhage     Unspecified hypothyroidism     Weight loss      Past Surgical History:   Procedure Laterality Date    BACK SURGERY  5/22/14    C4-C7 ACDF     CATARACT      COLONOSCOPY  7/5/11    COLONOSCOPY      FLUOR GID & Curtis Peel NDL/CATH SPI DX/THER NJX  9/28/2012    Procedure: TRANSFORAMINAL EPIDURAL - LUMBAR;  Surgeon: Donis Pruitt MD;  Location: [facility] [address] NDL/CATH SPI DX/THER Belvie Pleasure  4/28/2014    Procedure: INTRATHECAL PUMP TRIAL;  Surgeon: Donis Pruitt MD;  Location: [facility] [address]ID [address] NDL/CATH SPI DX/THER Belvie Pleasure N/A 1/15/2015    Procedure: INTRATHECAL PUMP TRIAL;  Surgeon: Donis Pruitt MD;  Location: [address] [place] [address] SURGERY  2014    INJECTION, ANESTHETIC/STEROID, TRANSFORAMINAL EPIDURAL; LUMBAR/SACRAL, SINGLE LEVEL  9/7/2012    Procedure: TRANSFORAMINAL EPIDURAL - LUMBAR;  Surgeon: Sondra Carrillo MD;  Location: [address] [place] [address] MANAGEMENT    INJECTION, ANESTHETIC/STEROID, TRANSFORAMINAL EPIDURAL; LUMBAR/SACRAL, SINGLE LEVEL  4/2/2013    Procedure: TRANSFORAMINAL EPIDURAL - LUMBAR;  Surgeon: Luis Eduardo Askew MD;  Location: [address] [place] [address], W/WO CONTRAST, DX/THERAPEUTIC SUBSTANCE, EPIDURAL/SUBARACHNOID; LUMBAR/SACRAL  9/28/2012    Procedure: TRANSFORAMINAL EPIDURAL - LUMBAR;  Surgeon: Luis Eduardo Askew MD;  Location: [address] [place] [address] PAIN MANAGEMENT    INJECTION, W/WO CONTRAST, DX/THERAPEUTIC SUBSTANCE, EPIDURAL/SUBARACHNOID; LUMBAR/SACRAL  4/28/2014    Procedure: INTRATHECAL PUMP TRIAL;  Surgeon: Luis Eduardo Askew MD;  Location: [facility] FOR PAIN MANAGEMENT    INJECTION, W/WO CONTRAST, DX/THERAPEUTIC SUBSTANCE, EPIDURAL/SUBARACHNOID; LUMBAR/SACRAL N/A 1/15/2015    Procedure: INTRATHECAL PUMP TRIAL;  Surgeon: Luis Eduardo Askew MD;  Location: [address] [place] [address]  6/09    cataract both eyes 2 weeks apart    M-SEDAJ BY  PHYS PERFRMG SVC 5+ YR  9/7/2012    Procedure: TRANSFORAMINAL EPIDURAL - LUMBAR;  Surgeon: Yogesh Gomez MD;  Location: [address] [facility]  PHYS [address] [address]  N SVC 5+ YR  12/18/2013    Procedure: STIMULATOR TRIAL EPIDURAL LEAD;  Surgeon: Luis Eduardo Askew MD;  Location: [facility] BY  PHYS [address] [address]  N SVC 5+ YR  2/3/2014    Procedure: STIMULATOR TRIAL PERIPHERAL;  Surgeon: Luis Eduardo Askew MD;  Location: [address] [facility]  PHYS [address] [address]  N SVC 5+ YR  4/28/2014    Procedure: INTRATHECAL PUMP TRIAL;  Surgeon: Luis Eduardo Askew MD;  Location: [facility] FOR PAIN MANAGEMENT    NEEDLE BIOPSY LIVER      ORTHOPEDIC SURG (PBP)  5/2013 8/2013    Lumbar 4 - 5     OTHER  08    esophagogastroduodenoscopy    OTHER      jaw fracture/repair, hardware remains    PATIENT DOCUMENTED NOT TO HAVE EXPERIENCED ANY OF THE FOLLOWING EVENTS  4/2/2013    Procedure: TRANSFORAMINAL EPIDURAL - LUMBAR;  Surgeon: Luis Eduardo Askew MD;  Location: [address] [place] [address] PAIN MANAGEMENT    PATIENT DOCUMENTED NOT TO HAVE EXPERIENCED ANY OF THE FOLLOWING EVENTS  12/18/2013    Procedure: STIMULATOR TRIAL EPIDURAL LEAD;  Surgeon: Rajinder Rivero MD;  Location: [facility] PAIN MANAGEMENT    PATIENT DOCUMENTED NOT TO HAVE EXPERIENCED ANY OF THE FOLLOWING EVENTS  2/3/2014    Procedure: STIMULATOR TRIAL PERIPHERAL;  Surgeon: Rajinder Rivero MD;  Location: [address] [place] [address]    PATIENT DOCUMENTED NOT TO HAVE EXPERIENCED ANY OF THE FOLLOWING EVENTS  4/28/2014    Procedure: INTRATHECAL PUMP TRIAL;  Surgeon: Rajinder Rivero MD;  Location: [facility] PAIN MANAGEMENT    PATIENT DOCUMENTED NOT TO HAVE EXPERIENCED ANY OF THE FOLLOWING EVENTS N/A 1/15/2015    Procedure: INTRATHECAL PUMP TRIAL;  Surgeon: Rajinder Rivero MD;  Location: [address] [place] [address]    PATIENT WITH PREOPERATIVE ORDER FOR IV ANTIBIOTIC SURGICAL SITE INFECT  2/3/2014    Procedure: STIMULATOR TRIAL PERIPHERAL;  Surgeon: Rajinder Rivero MD;  Location: [address] [place] [address]    PATIENT [address] [place] [address] FOR IV ANTIBIOTIC SURGICAL SITE INFECTION PROPHYLAXIS.  4/2/2013    Procedure: TRANSFORAMINAL EPIDURAL - LUMBAR;  Surgeon: Rajinder Rivero MD;  Location: [facility] PAIN MANAGEMENT    PATIENT [address] [place] [address] FOR IV ANTIBIOTIC SURGICAL SITE INFECTION PROPHYLAXIS. 12/18/2013    Procedure: STIMULATOR TRIAL EPIDURAL LEAD;  Surgeon: Rajinder Rivero MD;  Location: [address] [place] [address]    PATIENT [address] [place] [address] FOR IV ANTIBIOTIC SURGICAL SITE INFECTION PROPHYLAXIS.  4/28/2014    Procedure: INTRATHECAL PUMP TRIAL;  Surgeon: Rajinder Rivero MD;  Location: [facility] PAIN MANAGEMENT    PATIENT [address] [place] [address] FOR IV ANTIBIOTIC SURGICAL SITE INFECTION PROPHYLAXIS.  N/A 1/15/2015    Procedure: INTRATHECAL PUMP TRIAL;  Surgeon: Rajinder Rivero MD;  Location: [facility] [address] [facility]  2/3/2014    Procedure: STIMULATOR TRIAL PERIPHERAL;  Surgeon: David Seymour MD;  Location: [address] [facility] Jose Manuel [place] MANAGEMENT    PERCUT IMPLNT [address] [facility]  2/3/2014    Procedure: STIMULATOR TRIAL PERIPHERAL;  Surgeon: David Seymour MD;  Location: [address] [facility] [place] MANAGEMENT    PERCUT IMPLNT [address] [facility]  2/3/2014    Procedure: STIMULATOR TRIAL PERIPHERAL;  Surgeon: David Seymour MD;  Location: [address] [facility] Carr[place]le [place] MANAGEMENT    PERCUT IMPLNT [address] [facility]  2/3/2014    Procedure: STIMULATOR TRIAL PERIPHERAL;  Surgeon: David Seymour MD;  Location: [address] [facility] [place] MANAGEMENT    [facility]  12/18/2013    Procedure: STIMULATOR TRIAL EPIDURAL LEAD;  Surgeon: David Seymour MD;  Location: [address] [facility] [place] MANAGEMENT    PERC[facility]  12/18/2013    Procedure: STIMULATOR TRIAL EPIDURAL LEAD;  Surgeon: David Seymour MD;  Location: [facility] FOR PAIN MANAGEMENT    SIGMOIDOSCOPY,DIAGNOSTIC      SPINE SURGERY PROCEDURE UNLISTED      CERVICAL FUSION X3/HARDWARE, lumbar 4-5 fusion     TONSILLECTOMY      UPPER GI ENDOSCOPY,EXAM       Family History   Problem Relation Age of Onset    Hypertension Father     Heart Disorder Father     Hypertension Mother     Heart Disorder Mother       reports that he has never smoked. He has never used smokeless tobacco. He reports that he does not drink alcohol and does not use drugs. Allergies:   Allergies   Allergen Reactions    Neomycin SWELLING and PAIN     Polymyxin-bacitracin OINT, local swelling at site, ear swelling    Neomycin PAIN and SWELLING     Polymyxin-bacitracin OINT, local swelling at site, ear swelling    Bactrim [Sulfamethoxazole W/Trimethoprim]      Dry mouth and stomach cramping    Sulfa Antibiotics     Versed      Dizzy     Flonase [Fluticasone Propionate] FATIGUE     SUSP       Medications:    Current Facility-Administered Medications:     albuterol (Ventolin HFA) 108 (90 Base) MCG/ACT inhaler 2 puff, 2 puff, Inhalation, Q6H PRN    artificial saliva substitute (Biotene) oral solution 10 mL, 10 mL, Oral, TID    atorvastatin (Lipitor) tab 10 mg, 10 mg, Oral, Nightly    fentaNYL (Duragesic) 25 MCG/HR patch 1 patch, 1 patch, Transdermal, Q3 Days    levothyroxine (Synthroid) tab 100 mcg, 100 mcg, Oral, Daily    melatonin tab 6 mg, 6 mg, Oral, Nightly PRN    pantoprazole (Protonix) DR tab 40 mg, 40 mg, Oral, Before breakfast    melatonin tab 3 mg, 3 mg, Oral, Nightly PRN    ondansetron (Zofran) 4 MG/2ML injection 4 mg, 4 mg, Intravenous, Q6H PRN    sodium chloride 0.9% infusion, , Intravenous, Continuous    heparin (Porcine) 5000 UNIT/ML injection 5,000 Units, 5,000 Units, Subcutaneous, Q8H JOSE    acetaminophen (Tylenol Extra Strength) tab 500 mg, 500 mg, Oral, Q4H PRN    polyethylene glycol (PEG 3350) (Miralax) 17 g oral packet 17 g, 17 g, Oral, Daily PRN    sennosides (Senokot) tab 17.2 mg, 17.2 mg, Oral, Nightly PRN    bisacodyl (Dulcolax) 10 MG rectal suppository 10 mg, 10 mg, Rectal, Daily PRN    guaiFENesin (Robitussin) 100 MG/5 ML oral liquid 200 mg, 200 mg, Oral, Q4H PRN    glycerin-hypromellose- (Artifical Tears) 0.2-0.2-1 % ophthalmic solution 1 drop, 1 drop, Both Eyes, QID PRN    sodium chloride (Saline Mist) 0.65 % nasal solution 1 spray, 1 spray, Each Nare, Q3H PRN    clonazePAM (KlonoPIN) tab 1 mg, 1 mg, Oral, Nightly    haloperidol lactate (Haldol) 5 MG/ML injection 1 mg, 1 mg, Intravenous, Q6H PRN    mirtazapine (Remeron) tab 7.5 mg, 7.5 mg, Oral, Nightly    Review of Systems   Constitutional:  Positive for malaise/fatigue.      Mental Status Exam:     Objective      Vitals:    12/14/23 1214   BP: 135/82   Pulse:    Resp:    Temp:      Appearance: fair grooming, in no acute distress  Behavior: normal psychomotor  Attitude: cooperative  Gait: not observed    Speech: normal rate and rhythm and soft    Mood: depressed and tired  Affect: Congruent and Restricted    Thought process: logical  Thought content: no delusions  Perceptions: no hallucinations  Associations: Intact    Orientation: person and month and year  Attention and Concentration: fair today  Memory:  intact remote and impaired recent  Language: Intact naming and repetition  Fund of Knowledge: Able to recite name of [place] president    Insight: limited  Judgment: limited        Wt Readings from Last 6 Encounters:   12/12/23 53.1 kg (117 lb)   11/19/23 54 kg (119 lb 0.8 oz)   05/08/23 53.1 kg (117 lb)   10/20/22 53.1 kg (117 lb)   01/04/22 53.1 kg (117 lb)   12/26/21 53.1 kg (117 lb)

## 2023-12-14 NOTE — PROGRESS NOTES
Psych Liaison placed referrals in discharge summary for outpatient psychiatry/psychotherapy. dry, intact, no bleeding and no hematoma.

## 2023-12-14 NOTE — CM/SW NOTE
12/14/23 1300   Discharge disposition   Expected discharge disposition Home or Self   Discharge transportation [address] noted that PT and OT are recommending HH instead of [facility] placement. SW reviewed [facility] referral and patient was not accepted by any facilities due to his confusion. SW called patient's wife Alan Ardon to discuss plan with her. SW informed her that [facility] facilities are not accepting patient but that patient was accepted by [facility]. She is agreeable and requested transportation for patient to come home today. SW discussed [facility] option with her. FANI reserved [facility] and requested they schedule SOC for tomorrow. Pt to transport via [address] patient/family notified transport is not covered by insurance. Pt/family are agreeable to the charges. Transportation arranged for 2:45pm.     SW will continue to follow for plan of care changes and remain available for any additional DC needs or concerns.      Kashif CASILLAS, [facility]  Discharge Planner   [id number]

## 2023-12-14 NOTE — PHYSICAL THERAPY NOTE
PHYSICAL THERAPY EVALUATION - INPATIENT     Room Number: 418/418-A  Evaluation Date: 12/14/2023  Type of Evaluation: Initial  Physician Order: PT Eval and Treat    Presenting Problem: DAVID  Co-Morbidities : GERD, chronic pain, hypothyroidism, hyperlipidemia  Reason for Therapy: Mobility Dysfunction and Discharge Planning    History related to current admission: Patient is a 78year old male admitted on 12/11/2023 : Presents with dizziness and falls and AMS dx acute delirium due to DAVID/dehydration/sleep deprivation and possibly mild benzo withdrawal per psych    11/19-11/21/23: syncope and collapse > home      ASSESSMENT   In this PT evaluation, the patient presents with the following impairments 1) Mild dizziness reported throughout session - SBP did decrease from 135 in sitting to 118 in standing, returned to 130s when re-assessed after gait training, 2) Gait training performed with RW - able to ambulate 150ft  which pt reports is comparable to baseline. Educated on recommendations for increased time with position changes. These impairments and comorbidities manifest themselves as functional limitations in independent bed mobility, transfers, and gait. The patient is below baseline and would benefit from skilled inpatient PT to address the above deficits to assist patient in returning to prior to level of function. Functional outcome measures completed include [facility]. The AM-PAC '6-Clicks' Inpatient Basic Mobility Short Form was completed and this patient is demonstrating a Approx Degree of Impairment: 35.83%  degree of impairment in mobility. Research supports that patients with this level of impairment may benefit from HHPT//OT. DISCHARGE RECOMMENDATIONS  PT Discharge Recommendations: Home with home health PT/OT    PLAN  PT Treatment Plan: Bed mobility; Body mechanics; Coordination; Endurance; Energy conservation;Patient education; Family education;Gait training;Neuromuscular re-educate;Range of motion;Strengthening;Stoop training;Stair training;Transfer training;Balance training  Rehab Potential : Good  Frequency (Obs):  (1-2x per week)  Number of Visits to Meet Established Goals: 3      CURRENT GOALS    Goal #1 Patient is able to demonstrate supine - sit EOB @ level: supervision     Goal #2 Patient is able to demonstrate transfers EOB to/from Chair/Wheelchair at assistance level: supervision     Goal #3 Patient is able to ambulate 200 feet with assist device: walker - rolling at assistance level: supervision     Goal #4 Patient is able to navigate 2 stairs with rail and SBA   Goal #5    Goal #6    Goal Comments: Goals established on 2023    HOME SITUATION  Type of Home: [address]: One level  Stairs to Enter : 2             Lives With: Spouse (acts as FT CG for spouse)  Drives: No  Patient Owned Equipment: Rolling walker  Patient Regularly Uses: Reading glasses    Prior Level of [place]: reports 2-3 falls in the past 6 months described as syncopal episodes, ambulates with RW household distances     SUBJECTIVE  \" I just feel a little dizzy\"       OBJECTIVE  Precautions: Bed/chair alarm  Fall Risk: Standard fall risk    WEIGHT BEARING RESTRICTION  Weight Bearing Restriction: None                PAIN ASSESSMENT  Ratin          COGNITION  Overall Cognitive Status:  WFL - within functional limits    RANGE OF MOTION AND STRENGTH ASSESSMENT  Upper extremity ROM and strength are within functional limits     Lower extremity ROM is within functional limits     Lower extremity strength is within functional limits       BALANCE  Static Sitting: Good  Dynamic Sitting: Good  Static Standing: Fair -  Dynamic Standing: Fair -    ADDITIONAL TESTS                                    ACTIVITY TOLERANCE                         O2 WALK       NEUROLOGICAL FINDINGS                        AM-PAC '6-Clicks' INPATIENT SHORT FORM - BASIC MOBILITY  How much difficulty does the patient currently have... Patient Difficulty: Turning over in bed (including adjusting bedclothes, sheets and blankets)?: None   Patient Difficulty: Sitting down on and standing up from a chair with arms (e.g., wheelchair, bedside commode, etc.): A Little   Patient Difficulty: Moving from lying on back to sitting on the side of the bed?: None   How much help from another person does the patient currently need. .. Help from Another: Moving to and from a bed to a chair (including a wheelchair)?: A Little   Help from Another: Need to walk in hospital room?: A Little   Help from Another: Climbing 3-5 steps with a railing?: A Little       AM-PAC Score:  Raw Score: 20   Approx Degree of Impairment: 35.83%   Standardized Score (AM-PAC Scale): 47.67   CMS Modifier (G-Code): CJ    FUNCTIONAL ABILITY STATUS  Gait Assessment   Functional Mobility/Gait Assessment  Gait Assistance: Supervision  Distance (ft): 150  Assistive Device: Rolling walker  Pattern: Shuffle    Skilled Therapy Provided      Transfer Mobility:  Sit to stand: CGA   Stand to sit: CGA  Gait = CGA - vc for RW placement closer to patient     Therapist's Comments: pt educated on energy conservation techniques including slower gait speed, standing/seated rest breaks, pacing activities, and use of assistive device. Educated on recommendation for increased time for position changes and performing LE exercises including LAQ and ankle pumps in sitting prior to performing STS transfer     Exercise/Education Provided:  Energy conservation  Functional activity tolerated  Gait training  Transfer training    Patient End of Session: Up in chair;Needs met;Call light within reach;RN aware of session/findings; All patient questions and concerns addressed; Alarm set; Discussed recommendations with /      Patient Evaluation Complexity Level:  History Low - no personal factors and/or co-morbidities   Examination of body systems Low - addressing 1-2 elements   Clinical Presentation Low - Stable   Clinical Decision Making Low - Stable       PT Session Time: 25 minutes  Gait Training: 10 minutes  Therapeutic Activity:  minutes  Neuromuscular Re-education:  minutes  Therapeutic Exercise:  minutes

## 2023-12-15 ENCOUNTER — PATIENT OUTREACH (OUTPATIENT)
Dept: CASE MANAGEMENT | Age: 83
End: 2023-12-15

## 2023-12-15 DIAGNOSIS — Z02.9 ENCOUNTERS FOR UNSPECIFIED ADMINISTRATIVE PURPOSE: Primary | ICD-10-CM

## 2023-12-15 PROCEDURE — 1111F DSCHRG MED/CURRENT MED MERGE: CPT

## 2023-12-15 NOTE — PROGRESS NOTES
LM for pt to call [facility] for TCM since discharge. [facility] phone number was provided for pt to call back.

## 2023-12-21 ENCOUNTER — PATIENT MESSAGE (OUTPATIENT)
Dept: FAMILY MEDICINE CLINIC | Facility: CLINIC | Age: 83
End: 2023-12-21

## 2023-12-21 ENCOUNTER — TELEMEDICINE (OUTPATIENT)
Dept: FAMILY MEDICINE CLINIC | Facility: CLINIC | Age: 83
End: 2023-12-21
Payer: MEDICARE

## 2023-12-21 DIAGNOSIS — F32.A DEPRESSIVE DISORDER: ICD-10-CM

## 2023-12-21 DIAGNOSIS — E78.00 PURE HYPERCHOLESTEROLEMIA: ICD-10-CM

## 2023-12-21 DIAGNOSIS — K21.9 GASTROESOPHAGEAL REFLUX DISEASE WITHOUT ESOPHAGITIS: ICD-10-CM

## 2023-12-21 DIAGNOSIS — E03.9 HYPOTHYROIDISM, UNSPECIFIED TYPE: Primary | Chronic | ICD-10-CM

## 2023-12-21 PROBLEM — E87.1 HYPONATREMIA: Status: RESOLVED | Noted: 2021-07-26 | Resolved: 2023-12-21

## 2023-12-21 PROBLEM — S00.83XA CONTUSION OF FACE, INITIAL ENCOUNTER: Status: RESOLVED | Noted: 2023-11-19 | Resolved: 2023-12-21

## 2023-12-21 PROBLEM — R41.0 DELIRIUM: Status: RESOLVED | Noted: 2023-12-12 | Resolved: 2023-12-21

## 2023-12-21 PROBLEM — R26.81 GAIT INSTABILITY: Status: RESOLVED | Noted: 2023-12-12 | Resolved: 2023-12-21

## 2023-12-21 PROBLEM — N17.9 AKI (ACUTE KIDNEY INJURY) (HCC): Status: RESOLVED | Noted: 2023-12-11 | Resolved: 2023-12-21

## 2023-12-21 PROBLEM — R53.1 WEAKNESS: Status: RESOLVED | Noted: 2023-05-08 | Resolved: 2023-12-21

## 2023-12-21 PROBLEM — G93.40 ACUTE ENCEPHALOPATHY: Status: RESOLVED | Noted: 2023-12-12 | Resolved: 2023-12-21

## 2023-12-21 PROBLEM — R55 SYNCOPE AND COLLAPSE: Status: RESOLVED | Noted: 2023-11-19 | Resolved: 2023-12-21

## 2023-12-21 PROBLEM — U07.1 COVID-19: Status: RESOLVED | Noted: 2023-05-08 | Resolved: 2023-12-21

## 2023-12-21 PROBLEM — J18.9 COMMUNITY ACQUIRED PNEUMONIA OF RIGHT MIDDLE LOBE OF LUNG: Status: RESOLVED | Noted: 2023-05-08 | Resolved: 2023-12-21

## 2023-12-21 PROBLEM — N17.9 AKI (ACUTE KIDNEY INJURY): Status: RESOLVED | Noted: 2023-12-11 | Resolved: 2023-12-21

## 2023-12-21 PROBLEM — R55 SYNCOPE, UNSPECIFIED SYNCOPE TYPE: Status: RESOLVED | Noted: 2021-07-26 | Resolved: 2023-12-21

## 2023-12-21 RX ORDER — MIRTAZAPINE 7.5 MG/1
7.5 TABLET, FILM COATED ORAL NIGHTLY
Qty: 30 TABLET | Refills: 2 | Status: SHIPPED | OUTPATIENT
Start: 2024-01-12

## 2023-12-21 RX ORDER — LEVOTHYROXINE SODIUM 0.1 MG/1
100 TABLET ORAL DAILY
Qty: 90 TABLET | Refills: 3 | Status: SHIPPED | OUTPATIENT
Start: 2023-12-21

## 2023-12-21 RX ORDER — ATORVASTATIN CALCIUM 10 MG/1
10 TABLET, FILM COATED ORAL NIGHTLY
Qty: 30 TABLET | Refills: 0 | OUTPATIENT
Start: 2023-12-21

## 2023-12-21 RX ORDER — PANTOPRAZOLE SODIUM 40 MG/1
40 TABLET, DELAYED RELEASE ORAL
Qty: 90 TABLET | Refills: 3 | Status: SHIPPED | OUTPATIENT
Start: 2023-12-21

## 2023-12-21 NOTE — PROGRESS NOTES
Virtual Video Check-In    Vielka Zuleta verbally consents to a Virtual/Telephone Check-In visit on 12/21/23. Patient understands and accepts financial responsibility for any deductible, co-insurance and/or co-pays associated with this service. Duration of the service: 0363-4145    Summary of topics discussed: Patient hospitalized with syncope. Found to have acute kidney injury with dehydration. Upon discharge his creatinine was down to 1.09 from a peak of 1.5. He now has home nurse coming out once a week and physical therapy several times a week for weakness. He was started on mirtazapine. This is in place of clonazepam.  [place] that this was too sedating for him. He needs refills of pantoprazole for GERD and levothyroxine for hypothyroidism.     PAST MEDICAL HISTORY:  Past Medical History:   Diagnosis Date    Abdominal distention     Abdominal pain     Acute encephalopathy     Anemia     Back pain     Back problem     back and neck pain    Black stools     Bladder incontinence     Bloating     Community acquired pneumonia of right middle lobe of lung     Constipation     Diarrhea, unspecified     Disorder of prostate     Dizziness     Esophageal reflux     Exposure to TB     Fatigue     Flatulence/gas pain/belching     Frequent use of laxatives     Hearing loss     Heart palpitations     Hemorrhoids     High blood pressure     High cholesterol     Hoarseness, chronic     Indigestion 03/01/2018    Irregular bowel habits     Leaking of urine     Loss of appetite     Other and unspecified hyperlipidemia     PAC (premature atrial contraction) 01/22/2015    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    Problems with swallowing     Sleep disturbance     Stool incontinence     Unspecified disorder of thyroid     Unspecified essential hypertension     Unspecified gastritis and gastroduodenitis without mention of hemorrhage     Unspecified hypothyroidism     Weight loss      PAST SURGICAL HISTORY:  Past Surgical History:   Procedure Laterality Date    BACK SURGERY  5/22/14    C4-C7 ACDF     CATARACT      COLONOSCOPY  7/5/11    COLONOSCOPY      FLUOR GID & Venida Cat NDL/CATH SPI DX/THER NJX  9/28/2012    Procedure: TRANSFORAMINAL EPIDURAL - LUMBAR;  Surgeon: Natali Chavez MD;  Location: [address] GID & Venida Cat NDL/CATH SPI DX/THER Mg [place]  4/28/2014    Procedure: INTRATHECAL PUMP TRIAL;  Surgeon: Natali Chavez MD;  Location: [address] GID & Venida Cat NDL/CATH SPI DX/THER Mg [place] N/A 1/15/2015    Procedure: INTRATHECAL PUMP TRIAL;  Surgeon: Natali Chavez MD;  Location: [address] [place] [address]  2014    INJECTION, ANESTHETIC/STEROID, TRANSFORAMINAL EPIDURAL; LUMBAR/SACRAL, SINGLE LEVEL  9/7/2012    Procedure: TRANSFORAMINAL EPIDURAL - LUMBAR;  Surgeon: Georgette Joe MD;  Location: [address] [place] [address]    INJECTION, ANESTHETIC/STEROID, TRANSFORAMINAL EPIDURAL; LUMBAR/SACRAL, SINGLE LEVEL  4/2/2013    Procedure: TRANSFORAMINAL EPIDURAL - LUMBAR;  Surgeon: Natali Chavez MD;  Location: [address], W/WO CONTRAST, DX/THERAPEUTIC SUBSTANCE, EPIDURAL/SUBARACHNOID; LUMBAR/SACRAL  9/28/2012    Procedure: TRANSFORAMINAL EPIDURAL - LUMBAR;  Surgeon: Natali Chavez MD;  Location: [address] [place] [address] Dr PAIN MANAGEMENT    INJECTION, W/WO CONTRAST, DX/THERAPEUTIC SUBSTANCE, EPIDURAL/SUBARACHNOID; LUMBAR/SACRAL  4/28/2014    Procedure: INTRATHECAL PUMP TRIAL;  Surgeon: Natali Chavez MD;  Location: [facility] FOR PAIN MANAGEMENT    INJECTION, W/WO CONTRAST, DX/THERAPEUTIC SUBSTANCE, EPIDURAL/SUBARACHNOID; LUMBAR/SACRAL N/A 1/15/2015    Procedure: INTRATHECAL PUMP TRIAL;  Surgeon: Natali Chavez MD;  Location: [address] [place] [address]  6/09    cataract both eyes 2 weeks apart    M-SEDAJ BY [facility]ENIA [facility] 5+ YR  9/7/2012    Procedure: TRANSFORAMINAL EPIDURAL - LUMBAR; Surgeon: Sondra Carrillo MD;  [facility] [address]  PHYS [address] [facility] 59  N SVC 5+ YR  12/18/2013    Procedure: STIMULATOR TRIAL EPIDURAL LEAD;  Surgeon: Donis Pruitt MD;  Location: [address] [facility]    [facility]  PHYS [address] [facility] 59  N SVC 5+ YR  2/3/2014    Procedure: STIMULATOR TRIAL PERIPHERAL;  Surgeon: Donis Pruitt MD;  Location: [address] [facility] BY  PHYS [address] [facility] 59  N SVC 5+ YR  4/28/2014    Procedure: INTRATHECAL PUMP TRIAL;  Surgeon: Donis Pruitt MD;  Location: [facility] PAIN MANAGEMENT    NEEDLE BIOPSY LIVER      ORTHOPEDIC SURG ([facility])  5/2013 8/2013    Lumbar 4 - 5     OTHER  08    esophagogastroduodenoscopy    OTHER      jaw fracture/repair, hardware remains    PATIENT DOCUMENTED NOT TO HAVE EXPERIENCED ANY OF THE FOLLOWING EVENTS  4/2/2013    Procedure: TRANSFORAMINAL EPIDURAL - LUMBAR;  Surgeon: Doins Pruitt MD;  Location: [address] [place] [address] PAIN MANAGEMENT    PATIENT DOCUMENTED NOT TO HAVE EXPERIENCED ANY OF THE FOLLOWING EVENTS  12/18/2013    Procedure: STIMULATOR TRIAL EPIDURAL LEAD;  Surgeon: Donis Pruitt MD;  Location: [facility] PAIN MANAGEMENT    PATIENT DOCUMENTED NOT TO HAVE EXPERIENCED ANY OF THE FOLLOWING EVENTS  2/3/2014    Procedure: STIMULATOR TRIAL PERIPHERAL;  Surgeon: Donis Pruitt MD;  Location: [address] [place] [address]    PATIENT DOCUMENTED NOT TO HAVE EXPERIENCED ANY OF THE FOLLOWING EVENTS  4/28/2014    Procedure: INTRATHECAL PUMP TRIAL;  Surgeon: Donis Pruitt MD;  Location: [facility] PAIN MANAGEMENT    PATIENT DOCUMENTED NOT TO HAVE EXPERIENCED ANY OF THE FOLLOWING EVENTS N/A 1/15/2015    Procedure: INTRATHECAL PUMP TRIAL;  Surgeon: Donis Pruitt MD;  Location: [address] [place] [address]    PATIENT WITH PREOPERATIVE ORDER FOR IV ANTIBIOTIC SURGICAL SITE INFECT  2/3/2014    Procedure: STIMULATOR TRIAL PERIPHERAL;  Surgeon: Donis Pruitt MD;  Location: [address] [place] [address] PATIENT [place] PREOPERATIVE ORDER FOR IV ANTIBIOTIC SURGICAL SITE INFECTION PROPHYLAXIS.  4/2/2013    Procedure: TRANSFORAMINAL EPIDURAL - LUMBAR;  Surgeon: Lilliam Reagan MD;  Location: [address] [place] [address]    PATIENT [address] [place] [address] FOR IV ANTIBIOTIC SURGICAL SITE INFECTION PROPHYLAXIS. 12/18/2013    Procedure: STIMULATOR TRIAL EPIDURAL LEAD;  Surgeon: Lilliam Reagan MD;  Location: [address] [place] [address]    PATIENT [address] [place] [address] FOR IV ANTIBIOTIC SURGICAL SITE INFECTION PROPHYLAXIS.  4/28/2014    Procedure: INTRATHECAL PUMP TRIAL;  Surgeon: Lilliam Reagan MD;  Location: [facility] FOR PAIN MANAGEMENT    PATIENT [address] [place] [address] FOR IV ANTIBIOTIC SURGICAL SITE INFECTION PROPHYLAXIS.  N/A 1/15/2015    Procedure: INTRATHECAL PUMP TRIAL;  Surgeon: Lilliam Reagan MD;  Location: [address] [facility] [place] MANAGEMENT    PERCUT IMPLNT [address] [facility]  2/3/2014    Procedure: STIMULATOR TRIAL PERIPHERAL;  Surgeon: Lilliam Reagan MD;  Location: [address] [facility] [place] MANAGEMENT    PERCUT IMPLNT [address] [facility]  2/3/2014    Procedure: STIMULATOR TRIAL PERIPHERAL;  Surgeon: Lilliam Reagan MD;  Location: [address] [facility] [place] MANAGEMENT    PERCUT IMPLNT [address] [facility] [address]  2/3/2014    Procedure: STIMULATOR TRIAL PERIPHERAL;  Surgeon: Lilliam Reagan MD;  Location: [address] [facility] [place] MANAGEMENT    PERCUT IMPLNT [address] [facility]  2/3/2014    Procedure: STIMULATOR TRIAL PERIPHERAL;  Surgeon: Lilliam Reagan MD;  Location: [address] [facility] MANAGEMENT    PERCUT IMPLNT [address]  12/18/2013    Procedure: STIMULATOR TRIAL EPIDURAL LEAD;  Surgeon: Lilliam Reagan MD;  Location: [address] [facility] [place] MANAGEMENT    PERCUT IMPLNT [address]  12/18/2013    Procedure: STIMULATOR TRIAL EPIDURAL LEAD;  Surgeon: Lilliam Reagan MD;  Location: [address] [place] [address], lumbar 4-5 fusion     TONSILLECTOMY      UPPER GI ENDOSCOPY,EXAM       MEDICATIONS:  Current Outpatient Medications   Medication Sig Dispense Refill    levothyroxine 100 MCG Oral Tab Take 1 tablet (100 mcg total) by mouth daily. 90 tablet 3    [START ON 1/12/2024] mirtazapine 7.5 MG Oral Tab Take 1 tablet (7.5 mg total) by mouth nightly. 30 tablet 2    pantoprazole 40 MG Oral Tab EC Take 1 tablet (40 mg total) by mouth before breakfast. 90 tablet 3    clonazePAM 1 MG Oral Tab Take 1 tab nightly x 6 days, then 1/2 tab nightly x 6 days, then stop. 9 tablet 0    atorvastatin 10 MG Oral Tab Take 1 tablet (10 mg total) by mouth nightly. 30 tablet 0    Insulin Syringe-Needle U-100 (BD INSULIN SYRINGE ULTRAFINE) 31G X 5/16\" 1 ML Does not apply Misc Use for B12 injection 10 each 2    docusate sodium 100 MG Oral Cap Take 1 capsule (100 mg total) by mouth 2 (two) times daily. sennosides 8.6 MG Oral Tab Take 2 tablets (17.2 mg total) by mouth daily as needed (constipation). artificial saliva substitute Mouth/Throat Solution Take 10 mL by mouth in the morning, at noon, and at bedtime. ondansetron (ZOFRAN) 4 mg tablet Take 1 tablet (4 mg total) by mouth every 8 (eight) hours as needed for Nausea. albuterol 108 (90 Base) MCG/ACT Inhalation Aero Soln Inhale 2 puffs into the lungs every 6 (six) hours as needed for Wheezing. fentaNYL 25 MCG/HR Transdermal Patch 72 Hr Place 1 patch onto the skin every 3 (three) days. cyanocobalamin 1000 MCG/ML Injection Solution Inject 1 mL (1,000 mcg total) into the muscle every 30 (thirty) days. 3 mL 9    Naloxone HCl 4 MG/0.1ML Nasal Liquid 4 mg by Nasal route as needed. If patient remains unresponsive, repeat dose in other nostril 2-5 minutes after first dose. 1 kit 0    acetaminophen 500 MG Oral Tab Take 1 tablet (500 mg total) by mouth every 6 (six) hours as needed for Pain. psyllium 28 % Oral Powd Pack Take 1 packet by mouth 2 (two) times daily.  15 packet 0 Melatonin 10 MG Oral Cap Take 6 mg by mouth nightly as needed. Vitamin D3 (VITAMIN D3) 2000 UNITS Oral Cap Take 1 capsule (2,000 Units total) by mouth daily. ALLERGIES:   Neomycin, Neomycin, Bactrim [sulfamethoxazole w/trimethoprim], Sulfa antibiotics, Versed, and Flonase [fluticasone propionate]  FAMILY HISTORY  Family History   Problem Relation Age of Onset    Hypertension Father     Heart Disorder Father     Hypertension Mother     Heart Disorder Mother        PHYSICAL EXAM:  There were no vitals taken for this visit. Alert no acute distress breathing comfortably. ASSESSMENT/PLAN:    1. Hypothyroidism, unspecified type  Last TSH 14 months ago. I have placed an order. I wonder if it is possible for the home health nurse to draw this. - levothyroxine 100 MCG Oral Tab; Take 1 tablet (100 mcg total) by mouth daily. Dispense: 90 tablet; Refill: 3  - TSH W Reflex To Free T4; Future    2. Gastroesophageal reflux disease without esophagitis    - pantoprazole 40 MG Oral Tab EC; Take 1 tablet (40 mg total) by mouth before breakfast.  Dispense: 90 tablet; Refill: 3    3. Depressive disorder  Ordered for January 12 as the prescription was first done December 14.  - mirtazapine 7.5 MG Oral Tab; Take 1 tablet (7.5 mg total) by mouth nightly. Dispense: 30 tablet; Refill: 2         If this note is coded by time based on the Office/Outpatient Evaluation and Management Codes effective January 1, 2021, the time includes reviewing the chart before entering the exam room, the time spent with the patient in face to face discussion and examination, and the time documenting the visit. It may also include time ordering tests or reviewing test results completed on the same day. Please note that the following visit was completed using two-way, real-time interactive audio and/or video communication.   This has been done in good giovanny to provide continuity of care in the best interest of the provider-patient relationship, due to the ongoing public health crisis/national emergency and because of restrictions of visitation. There are limitations of this visit as no physical exam could be performed. Every conscious effort was taken to allow for sufficient and adequate time. This billing was spent on reviewing labs, medications, radiology tests and decision making. Appropriate medical decision-making and tests are ordered as detailed in the plan of care above.

## 2023-12-21 NOTE — TELEPHONE ENCOUNTER
From: Isai Zuleta  To: Kirsten Shanel  Sent: 12/21/2023 4:40 PM CST  Subject: Blood Test    Dr Mei Cordon,  [facility] can send their lab out to draw blood for my thyroid test but will need for you to send an order to them. Their fax number is   [phone].    Thank You,  Marilou Melchor

## 2023-12-22 NOTE — PROGRESS NOTES
Multiple attempts to reach pt and messages left with no return call. Patient completed virtual HFU appt with PCP on 12/21/23. Encounter closing.

## 2024-01-02 ENCOUNTER — PATIENT MESSAGE (OUTPATIENT)
Dept: FAMILY MEDICINE CLINIC | Facility: CLINIC | Age: 84
End: 2024-01-02

## 2024-01-03 RX ORDER — LEVOTHYROXINE SODIUM 88 UG/1
88 TABLET ORAL
Qty: 90 TABLET | Refills: 0 | Status: SHIPPED | OUTPATIENT
Start: 2024-01-03 | End: 2024-04-02

## 2024-01-03 NOTE — TELEPHONE ENCOUNTER
From: Ozzie Alfaro  To: Ozzie Zuleta  Sent: 1/2/2024 2:44 PM CST  Subject: TSH    Your thyroid level is a bit overtreated, TSH of 0.02. Normal ranges 0.4 up to 6.5 in the lab that we used. We can consider lowering your dose of levothyroxine by 12 mcg. This would decrease the risk of heart palpitations, insomnia, and anxiety.

## 2024-01-06 ENCOUNTER — APPOINTMENT (OUTPATIENT)
Dept: CT IMAGING | Facility: HOSPITAL | Age: 84
End: 2024-01-06
Attending: EMERGENCY MEDICINE
Payer: MEDICARE

## 2024-01-06 ENCOUNTER — HOSPITAL ENCOUNTER (OUTPATIENT)
Facility: HOSPITAL | Age: 84
Setting detail: OBSERVATION
Discharge: HOME HEALTH | End: 2024-01-08
Attending: EMERGENCY MEDICINE | Admitting: HOSPITALIST
Payer: MEDICARE

## 2024-01-06 ENCOUNTER — APPOINTMENT (OUTPATIENT)
Dept: GENERAL RADIOLOGY | Facility: HOSPITAL | Age: 84
End: 2024-01-06
Attending: EMERGENCY MEDICINE
Payer: MEDICARE

## 2024-01-06 DIAGNOSIS — R55 SYNCOPE AND COLLAPSE: Primary | ICD-10-CM

## 2024-01-06 DIAGNOSIS — S09.8XXA BLUNT HEAD TRAUMA, INITIAL ENCOUNTER: ICD-10-CM

## 2024-01-06 DIAGNOSIS — S16.1XXA STRAIN OF NECK MUSCLE, INITIAL ENCOUNTER: ICD-10-CM

## 2024-01-06 DIAGNOSIS — R10.9 ABDOMINAL PAIN, ACUTE: ICD-10-CM

## 2024-01-06 PROBLEM — K83.8 DILATION OF BILIARY TRACT: Status: ACTIVE | Noted: 2018-07-18

## 2024-01-06 PROBLEM — I95.1 ORTHOSTATIC HYPOTENSION: Status: ACTIVE | Noted: 2024-01-06

## 2024-01-06 PROBLEM — K22.2 ESOPHAGEAL STRICTURE: Status: ACTIVE | Noted: 2024-01-06

## 2024-01-06 LAB
ALBUMIN SERPL-MCNC: 3.1 G/DL (ref 3.4–5)
ALBUMIN/GLOB SERPL: 1.1 {RATIO} (ref 1–2)
ALP LIVER SERPL-CCNC: 63 U/L
ALT SERPL-CCNC: 16 U/L
ANION GAP SERPL CALC-SCNC: 3 MMOL/L (ref 0–18)
APTT PPP: 37.4 SECONDS (ref 23.3–35.6)
AST SERPL-CCNC: 10 U/L (ref 15–37)
BASOPHILS # BLD AUTO: 0.02 X10(3) UL (ref 0–0.2)
BASOPHILS NFR BLD AUTO: 0.5 %
BILIRUB SERPL-MCNC: 0.9 MG/DL (ref 0.1–2)
BUN BLD-MCNC: 11 MG/DL (ref 9–23)
CALCIUM BLD-MCNC: 8.2 MG/DL (ref 8.5–10.1)
CHLORIDE SERPL-SCNC: 101 MMOL/L (ref 98–112)
CO2 SERPL-SCNC: 31 MMOL/L (ref 21–32)
CREAT BLD-MCNC: 1.05 MG/DL
EGFRCR SERPLBLD CKD-EPI 2021: 70 ML/MIN/1.73M2 (ref 60–?)
EOSINOPHIL # BLD AUTO: 0.02 X10(3) UL (ref 0–0.7)
EOSINOPHIL NFR BLD AUTO: 0.5 %
ERYTHROCYTE [DISTWIDTH] IN BLOOD BY AUTOMATED COUNT: 13.7 %
GLOBULIN PLAS-MCNC: 2.9 G/DL (ref 2.8–4.4)
GLUCOSE BLD-MCNC: 124 MG/DL (ref 70–99)
HCT VFR BLD AUTO: 35.3 %
HGB BLD-MCNC: 12.3 G/DL
IMM GRANULOCYTES # BLD AUTO: 0.01 X10(3) UL (ref 0–1)
IMM GRANULOCYTES NFR BLD: 0.2 %
INR BLD: 1.77 (ref 0.8–1.2)
LYMPHOCYTES # BLD AUTO: 0.58 X10(3) UL (ref 1–4)
LYMPHOCYTES NFR BLD AUTO: 13.2 %
MCH RBC QN AUTO: 32.2 PG (ref 26–34)
MCHC RBC AUTO-ENTMCNC: 34.8 G/DL (ref 31–37)
MCV RBC AUTO: 92.4 FL
MONOCYTES # BLD AUTO: 0.38 X10(3) UL (ref 0.1–1)
MONOCYTES NFR BLD AUTO: 8.7 %
NEUTROPHILS # BLD AUTO: 3.37 X10 (3) UL (ref 1.5–7.7)
NEUTROPHILS # BLD AUTO: 3.37 X10(3) UL (ref 1.5–7.7)
NEUTROPHILS NFR BLD AUTO: 76.9 %
OSMOLALITY SERPL CALC.SUM OF ELEC: 281 MOSM/KG (ref 275–295)
PLATELET # BLD AUTO: 180 10(3)UL (ref 150–450)
POTASSIUM SERPL-SCNC: 4.3 MMOL/L (ref 3.5–5.1)
PROT SERPL-MCNC: 6 G/DL (ref 6.4–8.2)
PROTHROMBIN TIME: 20.8 SECONDS (ref 11.6–14.8)
RBC # BLD AUTO: 3.82 X10(6)UL
SODIUM SERPL-SCNC: 135 MMOL/L (ref 136–145)
TROPONIN I SERPL HS-MCNC: 8 NG/L
WBC # BLD AUTO: 4.4 X10(3) UL (ref 4–11)

## 2024-01-06 PROCEDURE — 74177 CT ABD & PELVIS W/CONTRAST: CPT | Performed by: EMERGENCY MEDICINE

## 2024-01-06 PROCEDURE — 99223 1ST HOSP IP/OBS HIGH 75: CPT | Performed by: STUDENT IN AN ORGANIZED HEALTH CARE EDUCATION/TRAINING PROGRAM

## 2024-01-06 PROCEDURE — 72125 CT NECK SPINE W/O DYE: CPT | Performed by: EMERGENCY MEDICINE

## 2024-01-06 PROCEDURE — 73080 X-RAY EXAM OF ELBOW: CPT | Performed by: EMERGENCY MEDICINE

## 2024-01-06 PROCEDURE — 71045 X-RAY EXAM CHEST 1 VIEW: CPT | Performed by: EMERGENCY MEDICINE

## 2024-01-06 PROCEDURE — 70450 CT HEAD/BRAIN W/O DYE: CPT | Performed by: EMERGENCY MEDICINE

## 2024-01-06 RX ORDER — SODIUM CHLORIDE, SODIUM LACTATE, POTASSIUM CHLORIDE, CALCIUM CHLORIDE 600; 310; 30; 20 MG/100ML; MG/100ML; MG/100ML; MG/100ML
INJECTION, SOLUTION INTRAVENOUS CONTINUOUS
Status: DISCONTINUED | OUTPATIENT
Start: 2024-01-06 | End: 2024-01-08

## 2024-01-06 RX ORDER — ACETAMINOPHEN 500 MG
1000 TABLET ORAL EVERY 8 HOURS PRN
Status: DISCONTINUED | OUTPATIENT
Start: 2024-01-06 | End: 2024-01-08

## 2024-01-06 RX ORDER — MELATONIN
3 NIGHTLY PRN
Status: DISCONTINUED | OUTPATIENT
Start: 2024-01-06 | End: 2024-01-08

## 2024-01-06 RX ORDER — ATORVASTATIN CALCIUM 10 MG/1
10 TABLET, FILM COATED ORAL NIGHTLY
Status: DISCONTINUED | OUTPATIENT
Start: 2024-01-06 | End: 2024-01-08

## 2024-01-06 RX ORDER — FENTANYL 25 UG/1
1 PATCH TRANSDERMAL
Status: DISCONTINUED | OUTPATIENT
Start: 2024-01-06 | End: 2024-01-08

## 2024-01-06 RX ORDER — SODIUM CHLORIDE 9 MG/ML
1000 INJECTION, SOLUTION INTRAVENOUS ONCE
Status: COMPLETED | OUTPATIENT
Start: 2024-01-06 | End: 2024-01-07

## 2024-01-06 RX ORDER — BENZONATATE 100 MG/1
200 CAPSULE ORAL 3 TIMES DAILY PRN
Status: DISCONTINUED | OUTPATIENT
Start: 2024-01-06 | End: 2024-01-08

## 2024-01-06 RX ORDER — ONDANSETRON 2 MG/ML
4 INJECTION INTRAMUSCULAR; INTRAVENOUS EVERY 6 HOURS PRN
Status: DISCONTINUED | OUTPATIENT
Start: 2024-01-06 | End: 2024-01-08

## 2024-01-06 RX ORDER — SODIUM CHLORIDE 9 MG/ML
INJECTION, SOLUTION INTRAVENOUS CONTINUOUS
Status: ACTIVE | OUTPATIENT
Start: 2024-01-06 | End: 2024-01-06

## 2024-01-06 RX ORDER — POLYETHYLENE GLYCOL 3350 17 G/17G
17 POWDER, FOR SOLUTION ORAL DAILY PRN
Status: DISCONTINUED | OUTPATIENT
Start: 2024-01-06 | End: 2024-01-08

## 2024-01-06 RX ORDER — METOCLOPRAMIDE HYDROCHLORIDE 5 MG/ML
10 INJECTION INTRAMUSCULAR; INTRAVENOUS EVERY 8 HOURS PRN
Status: DISCONTINUED | OUTPATIENT
Start: 2024-01-06 | End: 2024-01-08

## 2024-01-06 RX ORDER — LEVOTHYROXINE SODIUM 0.1 MG/1
100 TABLET ORAL
Status: DISCONTINUED | OUTPATIENT
Start: 2024-01-07 | End: 2024-01-07

## 2024-01-06 RX ORDER — BISACODYL 10 MG
10 SUPPOSITORY, RECTAL RECTAL
Status: DISCONTINUED | OUTPATIENT
Start: 2024-01-06 | End: 2024-01-08

## 2024-01-06 RX ORDER — FLUDROCORTISONE ACETATE 0.1 MG/1
0.1 TABLET ORAL DAILY
Status: DISCONTINUED | OUTPATIENT
Start: 2024-01-06 | End: 2024-01-08

## 2024-01-06 RX ORDER — ECHINACEA PURPUREA EXTRACT 125 MG
1 TABLET ORAL
Status: DISCONTINUED | OUTPATIENT
Start: 2024-01-06 | End: 2024-01-08

## 2024-01-06 RX ORDER — ENOXAPARIN SODIUM 100 MG/ML
40 INJECTION SUBCUTANEOUS DAILY
Status: DISCONTINUED | OUTPATIENT
Start: 2024-01-07 | End: 2024-01-08

## 2024-01-06 RX ORDER — SENNOSIDES 8.6 MG
17.2 TABLET ORAL NIGHTLY PRN
Status: DISCONTINUED | OUTPATIENT
Start: 2024-01-06 | End: 2024-01-08

## 2024-01-06 RX ORDER — PANTOPRAZOLE SODIUM 40 MG/1
40 TABLET, DELAYED RELEASE ORAL
Status: DISCONTINUED | OUTPATIENT
Start: 2024-01-06 | End: 2024-01-08

## 2024-01-06 NOTE — ED INITIAL ASSESSMENT (HPI)
Patient to the ER after a unwitnessed fall at home. Patient had + LOC, did hit head, no thinners. C collar applied in route, pain in head. Laceration to back of head.

## 2024-01-07 ENCOUNTER — APPOINTMENT (OUTPATIENT)
Dept: MRI IMAGING | Facility: HOSPITAL | Age: 84
End: 2024-01-07
Attending: STUDENT IN AN ORGANIZED HEALTH CARE EDUCATION/TRAINING PROGRAM
Payer: MEDICARE

## 2024-01-07 PROBLEM — M54.50 ACUTE MIDLINE LOW BACK PAIN WITHOUT SCIATICA: Status: ACTIVE | Noted: 2024-01-07

## 2024-01-07 PROBLEM — M54.2 NECK PAIN: Status: ACTIVE | Noted: 2024-01-07

## 2024-01-07 LAB
ALBUMIN SERPL-MCNC: 3.1 G/DL (ref 3.4–5)
ALBUMIN/GLOB SERPL: 1.3 {RATIO} (ref 1–2)
ALP LIVER SERPL-CCNC: 61 U/L
ALT SERPL-CCNC: 11 U/L
ANION GAP SERPL CALC-SCNC: 3 MMOL/L (ref 0–18)
AST SERPL-CCNC: 14 U/L (ref 15–37)
ATRIAL RATE: 68 BPM
BASOPHILS # BLD AUTO: 0.03 X10(3) UL (ref 0–0.2)
BASOPHILS NFR BLD AUTO: 0.5 %
BILIRUB SERPL-MCNC: 1.1 MG/DL (ref 0.1–2)
BUN BLD-MCNC: 8 MG/DL (ref 9–23)
CALCIUM BLD-MCNC: 8.5 MG/DL (ref 8.5–10.1)
CHLORIDE SERPL-SCNC: 106 MMOL/L (ref 98–112)
CO2 SERPL-SCNC: 29 MMOL/L (ref 21–32)
CREAT BLD-MCNC: 0.8 MG/DL
EGFRCR SERPLBLD CKD-EPI 2021: 87 ML/MIN/1.73M2 (ref 60–?)
EOSINOPHIL # BLD AUTO: 0.05 X10(3) UL (ref 0–0.7)
EOSINOPHIL NFR BLD AUTO: 0.8 %
ERYTHROCYTE [DISTWIDTH] IN BLOOD BY AUTOMATED COUNT: 13.7 %
FLUAV + FLUBV RNA SPEC NAA+PROBE: NEGATIVE
FLUAV + FLUBV RNA SPEC NAA+PROBE: NEGATIVE
GLOBULIN PLAS-MCNC: 2.4 G/DL (ref 2.8–4.4)
GLUCOSE BLD-MCNC: 92 MG/DL (ref 70–99)
HCT VFR BLD AUTO: 34.7 %
HGB BLD-MCNC: 11.9 G/DL
IMM GRANULOCYTES # BLD AUTO: 0.01 X10(3) UL (ref 0–1)
IMM GRANULOCYTES NFR BLD: 0.2 %
INR BLD: 1.13 (ref 0.8–1.2)
LYMPHOCYTES # BLD AUTO: 1.3 X10(3) UL (ref 1–4)
LYMPHOCYTES NFR BLD AUTO: 21.8 %
MAGNESIUM SERPL-MCNC: 2.1 MG/DL (ref 1.6–2.6)
MCH RBC QN AUTO: 32 PG (ref 26–34)
MCHC RBC AUTO-ENTMCNC: 34.3 G/DL (ref 31–37)
MCV RBC AUTO: 93.3 FL
MONOCYTES # BLD AUTO: 0.47 X10(3) UL (ref 0.1–1)
MONOCYTES NFR BLD AUTO: 7.9 %
NEUTROPHILS # BLD AUTO: 4.1 X10 (3) UL (ref 1.5–7.7)
NEUTROPHILS # BLD AUTO: 4.1 X10(3) UL (ref 1.5–7.7)
NEUTROPHILS NFR BLD AUTO: 68.8 %
OSMOLALITY SERPL CALC.SUM OF ELEC: 284 MOSM/KG (ref 275–295)
P AXIS: 77 DEGREES
P-R INTERVAL: 146 MS
PHOSPHATE SERPL-MCNC: 2.9 MG/DL (ref 2.5–4.9)
PLATELET # BLD AUTO: 167 10(3)UL (ref 150–450)
POTASSIUM SERPL-SCNC: 3.9 MMOL/L (ref 3.5–5.1)
PROT SERPL-MCNC: 5.5 G/DL (ref 6.4–8.2)
PROTHROMBIN TIME: 14.5 SECONDS (ref 11.6–14.8)
Q-T INTERVAL: 364 MS
QRS DURATION: 86 MS
QTC CALCULATION (BEZET): 387 MS
R AXIS: -7 DEGREES
RBC # BLD AUTO: 3.72 X10(6)UL
RSV RNA SPEC NAA+PROBE: NEGATIVE
SARS-COV-2 RNA RESP QL NAA+PROBE: NOT DETECTED
SODIUM SERPL-SCNC: 138 MMOL/L (ref 136–145)
T AXIS: 52 DEGREES
VENTRICULAR RATE: 68 BPM
WBC # BLD AUTO: 6 X10(3) UL (ref 4–11)

## 2024-01-07 PROCEDURE — 74181 MRI ABDOMEN W/O CONTRAST: CPT | Performed by: STUDENT IN AN ORGANIZED HEALTH CARE EDUCATION/TRAINING PROGRAM

## 2024-01-07 PROCEDURE — 99222 1ST HOSP IP/OBS MODERATE 55: CPT | Performed by: STUDENT IN AN ORGANIZED HEALTH CARE EDUCATION/TRAINING PROGRAM

## 2024-01-07 PROCEDURE — 76376 3D RENDER W/INTRP POSTPROCES: CPT | Performed by: STUDENT IN AN ORGANIZED HEALTH CARE EDUCATION/TRAINING PROGRAM

## 2024-01-07 PROCEDURE — 99232 SBSQ HOSP IP/OBS MODERATE 35: CPT | Performed by: INTERNAL MEDICINE

## 2024-01-07 RX ORDER — LEVOTHYROXINE SODIUM 88 UG/1
88 TABLET ORAL
Status: DISCONTINUED | OUTPATIENT
Start: 2024-01-07 | End: 2024-01-08

## 2024-01-07 NOTE — ED QUICK NOTES
Orders for admission, patient is aware of plan and ready to go upstairs. Any questions, please call ED RN Agusto at extension [phone].     Patient Covid vaccination status: Fully vaccinated     COVID Test Ordered in ED: None    COVID Suspicion at Admission: N/A    Running Infusions:      Mental Status/LOC at time of transport: alert, oriented x3 but very forgetful, this is baseline     Other pertinent information:   CIWA score: N/A   NIH score:  N/A

## 2024-01-07 NOTE — ED PROVIDER NOTES
Patient Seen in: [facility] Emergency Department      History     Chief Complaint   Patient presents with    Fall     Stated Complaint: unwitnessed fall, LOC+, hit head, c collar on, no thinners, alert x 3 with conf*    Subjective:   HPI    Patient is 83-year-old male presents emergency room with a history of having unwitnessed fall at home.  The patient states he remembers standing at the sink and the next he knew he was on the floor.  The patient does not recall how he ended up on the floor or how he passed out today.  Patient denies any chest pain or back pain.  The patient denies history of any shortness of breath.  The patient does complain of some abdominal pain also complains of some headache at this time.  Patient also hit his left elbow complaining of some left elbow pain.  Patient denies history of any vomiting or diarrhea at home.  The patient has been admitted to the hospital a couple of times in the last 4 months as per my review of medical records for acute kidney injury and also for syncopal event.  Patient denies history of any weakness or numbness to the arms or legs.  The patient denies history of any other somatic complaints or discomfort at this time.    Objective:   Past Medical History:   Diagnosis Date    Abdominal distention     Abdominal pain     Acute encephalopathy     Anemia     Back pain     Back problem     back and neck pain    Black stools     Bladder incontinence     Bloating     Community acquired pneumonia of right middle lobe of lung     Constipation     Diarrhea, unspecified     Disorder of prostate     Dizziness     Esophageal reflux     Exposure to TB     Fatigue     Flatulence/gas pain/belching     Frequent use of laxatives     Hearing loss     Heart palpitations     Hemorrhoids     High blood pressure     High cholesterol     Hoarseness, chronic     Indigestion 03/01/2018    Irregular bowel habits     Leaking of urine     Loss of appetite     Other and unspecified  hyperlipidemia     PAC (premature atrial contraction) 01/22/2015    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    Problems with swallowing     Sleep disturbance     Stool incontinence     Unspecified disorder of thyroid     Unspecified essential hypertension     Unspecified gastritis and gastroduodenitis without mention of hemorrhage     Unspecified hypothyroidism     Weight loss               Past Surgical History:   Procedure Laterality Date    BACK SURGERY  5/22/14    C4-C7 ACDF     CATARACT      COLONOSCOPY  7/5/11    COLONOSCOPY      FLUOR GID & LOCLZJ NDL/CATH SPI DX/THER NJX  9/28/2012    Procedure: TRANSFORAMINAL EPIDURAL - LUMBAR;  Surgeon: Balbir Parks MD;  Location: [facility] FOR PAIN MANAGEMENT    FLUOR GID & LOCLZJ NDL/CATH SPI DX/THER NJX  4/28/2014    Procedure: INTRATHECAL PUMP TRIAL;  Surgeon: Balbir Parks MD;  Location: [facility] FOR PAIN MANAGEMENT    FLUOR GID & LOCLZJ NDL/CATH SPI DX/THER NJX N/A 1/15/2015    Procedure: INTRATHECAL PUMP TRIAL;  Surgeon: Balbir Parks MD;  Location: [facility] FOR PAIN MANAGEMENT    HERNIA SURGERY  2014    INJECTION, ANESTHETIC/STEROID, TRANSFORAMINAL EPIDURAL; LUMBAR/SACRAL, SINGLE LEVEL  9/7/2012    Procedure: TRANSFORAMINAL EPIDURAL - LUMBAR;  Surgeon: Andre Taylor MD;  Location: [facility] FOR PAIN MANAGEMENT    INJECTION, ANESTHETIC/STEROID, TRANSFORAMINAL EPIDURAL; LUMBAR/SACRAL, SINGLE LEVEL  4/2/2013    Procedure: TRANSFORAMINAL EPIDURAL - LUMBAR;  Surgeon: Balbir Parks MD;  Location: [facility] FOR PAIN MANAGEMENT    INJECTION, W/WO CONTRAST, DX/THERAPEUTIC SUBSTANCE, EPIDURAL/SUBARACHNOID; LUMBAR/SACRAL  9/28/2012    Procedure: TRANSFORAMINAL EPIDURAL - LUMBAR;  Surgeon: Balbir Parks MD;  Location: [facility] FOR PAIN MANAGEMENT    INJECTION, W/WO CONTRAST, DX/THERAPEUTIC SUBSTANCE, EPIDURAL/SUBARACHNOID; LUMBAR/SACRAL  4/28/2014    Procedure: INTRATHECAL PUMP TRIAL;  Surgeon: Balbir Parks MD;  Location: [facility]  FOR PAIN MANAGEMENT    INJECTION, W/WO CONTRAST, DX/THERAPEUTIC SUBSTANCE, EPIDURAL/SUBARACHNOID; LUMBAR/SACRAL N/A 1/15/2015    Procedure: INTRATHECAL PUMP TRIAL;  Surgeon: Balbir Parks MD;  Location: [facility] FOR PAIN MANAGEMENT    LASER SURGERY OF EYE  6/09    cataract both eyes 2 weeks apart    M-SEDAJ BY  PHYS [facility] SVC 5+ YR  9/7/2012    Procedure: TRANSFORAMINAL EPIDURAL - LUMBAR;  Surgeon: Andre Taylor MD;  Location: [facility]    M-SEDAJ BY  PHYS [facility] SV 5+ YR  12/18/2013    Procedure: STIMULATOR TRIAL EPIDURAL LEAD;  Surgeon: Balbir Parks MD;  Location: [facility] FOR PAIN MANAGEMENT    M-SEDAJ BY [facility] [place] [facility] 5[facility]  2/3/2014    Procedure: STIMULATOR TRIAL PERIPHERAL;  Surgeon: Balbir Parks MD;  Location: [facility] FOR PAIN MANAGEMENT    M-SEDAJ BY [facility] [place] [facility] 5[facility]  4/28/2014    Procedure: INTRATHECAL PUMP TRIAL;  Surgeon: Balbir Parks MD;  Location: [facility] FOR PAIN MANAGEMENT    NEEDLE BIOPSY LIVER      ORTHOPEDIC SURG (PBP)  5/2013 8/2013    Lumbar 4 - 5     OTHER  08    esophagogastroduodenoscopy    OTHER      jaw fracture/repair, hardware remains    PATIENT DOCUMENTED NOT TO HAVE EXPERIENCED ANY OF THE FOLLOWING EVENTS  4/2/2013    Procedure: TRANSFORAMINAL EPIDURAL - LUMBAR;  Surgeon: Balbir Parks MD;  Location: [facility] FOR PAIN MANAGEMENT    PATIENT DOCUMENTED NOT TO HAVE EXPERIENCED ANY OF THE FOLLOWING EVENTS  12/18/2013    Procedure: STIMULATOR TRIAL EPIDURAL LEAD;  Surgeon: Balbir Parks MD;  Location: [facility] FOR PAIN MANAGEMENT    PATIENT DOCUMENTED NOT TO HAVE EXPERIENCED ANY OF THE FOLLOWING EVENTS  2/3/2014    Procedure: STIMULATOR TRIAL PERIPHERAL;  Surgeon: Balbir Parks MD;  Location: [facility] FOR PAIN MANAGEMENT    PATIENT DOCUMENTED NOT TO HAVE EXPERIENCED ANY OF THE FOLLOWING EVENTS  4/28/2014    Procedure: INTRATHECAL PUMP TRIAL;  Surgeon: Balbir Parks MD;  Location: [facility] FOR PAIN MANAGEMENT     PATIENT DOCUMENTED NOT TO HAVE EXPERIENCED ANY OF THE FOLLOWING EVENTS N/A 1/15/2015    Procedure: INTRATHECAL PUMP TRIAL;  Surgeon: Balbir Parks MD;  Location: [facility] PA[facility]    PATIENT WITH PREOPERATIVE ORDER FOR IV ANTIBIOTIC SURGICAL SITE INFECT  2/3/2014    Procedure: STIMULATOR TRIAL PERIPHERAL;  Surgeon: Balbir Parks MD;  Location: [facility]    PATIENT WITHOUGH PREOPERATIVE ORDER FOR IV ANTIBIOTIC SURGICAL SITE INFECTION PROPHYLAXIS.  4/2/2013    Procedure: TRANSFORAMINAL EPIDURAL - LUMBAR;  Surgeon: Balbir Parks MD;  Location: [facility]    PATIENT WITHOUGH PREOPERATIVE ORDER FOR IV ANTIBIOTIC SURGICAL SITE INFECTION PROPHYLAXIS.  12/18/2013    Procedure: STIMULATOR TRIAL EPIDURAL LEAD;  Surgeon: Balbir Parks MD;  Location: [facility]    PATIENT WITHOUGH PREOPERATIVE ORDER FOR IV ANTIBIOTIC SURGICAL SITE INFECTION PROPHYLAXIS.  4/28/2014    Procedure: INTRATHECAL PUMP TRIAL;  Surgeon: Balbir Parks MD;  Location: [facility] FOR PAIN MANAGEMENT    PATIENT WITHOUGH PREOPERATIVE ORDER FOR IV ANTIBIOTIC SURGICAL SITE INFECTION PROPHYLAXIS. N/A 1/15/2015    Procedure: INTRATHECAL PUMP TRIAL;  Surgeon: Balbir Parks MD;  Location: [facility] FOR PAIN MANAGEMENT    PERCUT IMPLNT NEUROELEC,[facility]  2/3/2014    Procedure: STIMULATOR TRIAL PERIPHERAL;  Surgeon: Balbir Parks MD;  Location: [facility] FOR PAIN MANAGEMENT    PERCUT IMPLNT NEUROELEC,[facility]  2/3/2014    Procedure: STIMULATOR TRIAL PERIPHERAL;  Surgeon: Balbir Parks MD;  Location: [facility] FOR PAIN MANAGEMENT    PERCUT IMPLNT NEUROELEC,[facility]  2/3/2014    Procedure: STIMULATOR TRIAL PERIPHERAL;  Surgeon: Balbir Parks MD;  Location: [facility] FOR PAIN MANAGEMENT    PERCUT IMPLNT NEUROELEC,PER[facility]  2/3/2014    Procedure: STIMULATOR TRIAL PERIPHERAL;  Surgeon: Balbir Parks MD;  Location: [facility]    PERCUT IMPLNT NEUROELECT,EPIDURAL   12/18/2013    Procedure: STIMULATOR TRIAL EPIDURAL LEAD;  Surgeon: Balbir Parks MD;  Location: [facility] FOR PAIN MANAGEMENT    PERCUT IMPLNT NEUROELECT,EPIDURAL  12/18/2013    Procedure: STIMULATOR TRIAL EPIDURAL LEAD;  Surgeon: Balbir Parks MD;  Location: [facility] FOR PAIN MANAGEMENT    SIGMOIDOSCOPY,DIAGNOSTIC      SPINE SURGERY PROCEDURE UNLISTED      CERVICAL FUSION X3/HARDWARE, lumbar 4-5 fusion     TONSILLECTOMY      UPPER GI ENDOSCOPY,EXAM                  Social History     Socioeconomic History    Marital status:    Tobacco Use    Smoking status: Never    Smokeless tobacco: Never   Vaping Use    Vaping Use: Never used   Substance and Sexual Activity    Alcohol use: No    Drug use: No   Other Topics Concern    Caffeine Concern Yes     Comment: 1 cup of coffee/day    Exercise No     Social Determinants of Health     Financial Resource Strain: Low Risk  (5/24/2023)    Financial Resource Strain     Difficulty of Paying Living Expenses: Not very hard     Med Affordability: No   Food Insecurity: No Food Insecurity (12/12/2023)    Food Insecurity     Food Insecurity: Never true   Transportation Needs: No Transportation Needs (12/12/2023)    Transportation Needs     Lack of Transportation: No   Housing Stability: Low Risk  (12/12/2023)    Housing Stability     Housing Instability: No              Review of Systems    Positive for stated complaint: unwitnessed fall, LOC+, hit head, c collar on, no thinners, alert x 3 with conf*  Other systems are as noted in HPI.  Constitutional and vital signs reviewed.      All other systems reviewed and negative except as noted above.    Physical Exam     ED Triage Vitals [01/06/24 1608]   BP (!) 171/91   Pulse 69   Resp 20   Temp 98.2 °F (36.8 °C)   Temp src Temporal   SpO2 97 %   O2 Device None (Room air)       Current:BP (!) 148/94   Pulse 86   Temp 98.2 °F (36.8 °C) (Temporal)   Resp 20   SpO2 98%         Physical Exam    GENERAL: Well-developed,  well-nourished male sitting up breathing easily in no apparent distress.  Patient is nontoxic appearance.  HEENT: Head is normocephalic.  There is approximately 2 cm linear laceration noted to the left temporal area of the scalp.  There is no active hemorrhage appreciated.  There is no foreign body noted within the wound.  Pupils are 3 mm equally round and reactive to light. Oropharynx is clear. Mucous membranes are moist.  NECK: Patient cervical spine is initially immobilized however after radiologic clearance there is no focal tenderness to palpation appreciated midline.   No stridor.  LUNGS: Clear to auscultation bilaterally with no wheeze. There is good equal air entry bilaterally.  HEART: Regular rate and rhythm. Normal S1, S2 no S3, or S4. No murmur.  ABDOMEN: There is mild focal tenderness to palpation appreciated to the mid abdomen only with no other focal tenderness to palpation appreciated.  There is no overlying ecchymosis appreciated.. There is no guarding, no rebound, no mass, and no organomegaly appreciated. There is normoactive bowel sounds.   BACK: There is no focal tenderness palpation throughout the thoracic or lumbar spinous processes.  EXTREMITIES: There is no cyanosis, clubbing, or edema appreciated. Pulses are 2+ and equal in all 4 extremities.  There is a contusion with some mild tenderness over the extensor aspect of the left elbow only.  There is no other focal tenderness throughout the upper or lower extremities appreciated.  NEURO: Patient is awake, alert and patient is answering all questions appropriately. Motor strength is 5 over 5 in all 4 extremities. There are no gross motor or sensory deficits appreciated. Cranial nerves II through XII are intact.          ED Course     Labs Reviewed   COMP METABOLIC PANEL (14) - Abnormal; Notable for the following components:       Result Value    Glucose 124 (*)     Sodium 135 (*)     Calcium, Total 8.2 (*)     AST 10 (*)     Total Protein 6.0 (*)      Albumin 3.1 (*)     All other components within normal limits   PROTHROMBIN TIME (PT) - Abnormal; Notable for the following components:    PT 20.8 (*)     INR 1.77 (*)     All other components within normal limits   PTT, ACTIVATED - Abnormal; Notable for the following components:    PTT 37.4 (*)     All other components within normal limits   CBC W/ DIFFERENTIAL - Abnormal; Notable for the following components:    HGB 12.3 (*)     HCT 35.3 (*)     Lymphocyte Absolute 0.58 (*)     All other components within normal limits   TROPONIN I HIGH SENSITIVITY - Normal   CBC WITH DIFFERENTIAL WITH PLATELET    Narrative:     The following orders were created for panel order CBC With Differential With Platelet.  Procedure                               Abnormality         Status                     ---------                               -----------         ------                     CBC W/ DIFFERENTIAL[[id number]]          Abnormal            Final result                 Please view results for these tests on the individual orders.     EKG    Rate, intervals and axes as noted on EKG Report.  Rate: 68  Rhythm: Sinus Rhythm  Reading: Nonspecific ST change no acute ST elevation appreciated.         I personally viewed the patient left elbow x-ray my individual interpretation shows no evidence of any acute fracture noted.  I also reviewed the official radiology report which showed results as noted below.        CT ABDOMEN PELVIS IV CONTRAST, NO ORAL (ER)    Result Date: 1/6/2024  CONCLUSION:  1. The common duct is dilated measuring up to 1.3 cm.  No definite obstructing lesions are identified on CT.  This may be further evaluated with ERCP if indicated. 2. Lumbar fusion with findings concerning for hardware failure around the left L5 pedicle screw.   LOCATION:  [facility]   Dictated by (CST): Germán Granados MD on 1/06/2024 at 6:17 PM     Finalized by (CST): Germán Granados MD on 1/06/2024 at 6:28 PM       XR ELBOW, COMPLETE (MIN 3  VIEWS), LEFT (CPT=73080)    Result Date: 1/6/2024  CONCLUSION:  No acute abnormality in the left elbow.   LOCATION:  [facility]    Dictated by (CST): Germán Granados MD on 1/06/2024 at 6:01 PM     Finalized by (CST): Germán Granados MD on 1/06/2024 at 6:01 PM       CT BRAIN OR HEAD (55280)    Result Date: 1/6/2024  CONCLUSION:  No acute intracranial abnormality.  Mild left parietal scalp soft tissue swelling    LOCATION:  [facility]   Dictated by (CST): Germán Granados MD on 1/06/2024 at 5:58 PM     Finalized by (CST): Germán Granados MD on 1/06/2024 at 6:01 PM       XR CHEST AP PORTABLE  (CPT=71045)    Result Date: 1/6/2024  CONCLUSION:  Mild patchy interstitial opacity in the lungs may represent infiltrate.  Correlate clinically.   LOCATION:  [facility]      Dictated by (CST): Germán Granados MD on 1/06/2024 at 5:57 PM     Finalized by (CST): Germán Granados MD on 1/06/2024 at 5:57 PM       CT SPINE CERVICAL (CPT=72125)    Result Date: 1/6/2024  CONCLUSION:  1. No acute abnormality in the cervical spine.  2. Degenerative changes in the cervical spine as above.    LOCATION:  [facility]   Dictated by (CST): Germán Granados MD on 1/06/2024 at 5:48 PM     Finalized by (CST): Germán Granados MD on 1/06/2024 at 5:56 PM              MDM          18:06 patient sitting back and breathing easily in no apparent distress this time.  The patient will be further observed at this time.  Patient answering all questions appropriately at this time.  Admission disposition: 1/6/2024  7:22 PM         Patient an IV line established blood work drawn including CBC, chemistries, BUN/creatinine, and blood sugar all of which are unremarkable.  Liver function test troponin found be negative.  INR is 1.77.  The patient placed on continuous pulse ox and cardiac monitor.  Differential diagnosis considered myself includes acute cardiac arrhythmia, acute intra-abdominal bleed, acute intracerebral bleed.  Patient underwent x-ray and CT findings as noted above.  Patient remained  awake, alert, and appropriate throughout the rest ER stay.  Patient has no recollection whatsoever of what happened to him or why he passed out today.  Patient laceration was repaired as noted below.      Patient's overlying skin was cleaned and prepped sterilely here in the emergency room.  The patient's overlying skin was anesthetized 1% lidocaine without epinephrine in the full depth of wound was explored there is no evidence of any foreign body noted within the wound.  Patient laceration was well-approximated total of 4 staples here in the ER.  Patient tolerated procedure well with no complaints.    The patient has no recollection of what happened to him today the patient will be admitted to the hospital for further observation this evening.  Patient remained in normal sinus rhythm without ectopy here in the ER.  [facility]  The patient was admitted for further care at this time.                               Medical Decision Making      Disposition and Plan     Clinical Impression:  1. Syncope and collapse    2. Blunt head trauma, initial encounter    3. Abdominal pain, acute    4. Strain of neck muscle, initial encounter         Disposition:  Admit  1/6/2024  7:22 pm    Follow-up:  No follow-up provider specified.        Medications Prescribed:  Current Discharge Medication List                            Hospital Problems       Present on Admission  Date Reviewed: 12/21/2023            ICD-10-CM Noted POA    * (Principal) Syncope and collapse R55 1/6/2024 Unknown

## 2024-01-07 NOTE — SLP NOTE
Orders received for swallow evaluation due to history of modified diet (puree) and takes pills with water per RN report.   Patient currently NPO for test/procedure.  RN aware.  Will see patient tomorrow and evaluate when NPO status lifted.     Raya Tracy M.A., CCC-SLP

## 2024-01-07 NOTE — H&P
[facility]     Ozzie Zuleta Patient Status:  Emergency    3/8/1940 MRN [id number]   Location [facility] EMERGENCY DEPARTMENT Attending Ji Haro MD   Hosp Day # 0 PCP Ozzie Alfaro MD     Chief Complaint: Syncope    History of Present Illness: Ozzie Zuleta is a 83 year old male with PMHx GERD, HTN, HLD, recurrent falls, chronic benzo use, hx esophageal stricture s/p balloon dilation, Hypothyroidism who presents for syncope.     Patient with history of recurrent syncopal episodes, multiple admissions for same, with cardiac workup negative.  Notes that today he was at home, was walking with walker when he started feeling lightheaded and dizzy and passed out.  Hit his head on the linoleum floor, states that he regained consciousness in approximately 30 seconds to an minute per his wife.  Was back to baseline mentation after waking.  Denies associated chest pain, pressure, abdominal pain, nausea, vomiting.  Does note that he has a history of orthostatic hypotension and gets lightheaded when standing.  Notes that uses a walker and attempt to alleviate this however the longer he stands the more lightheaded he gets and notes that if he was standing for more than 1015 minutes he is likely to pass out.  Has not tried any medications for orthostasis or compression stockings.    Past Medical History:  Past Medical History:   Diagnosis Date    Abdominal distention     Abdominal pain     Acute encephalopathy     Anemia     Back pain     Back problem     back and neck pain    Black stools     Bladder incontinence     Bloating     Community acquired pneumonia of right middle lobe of lung     Constipation     Diarrhea, unspecified     Disorder of prostate     Dizziness     Esophageal reflux     Exposure to TB     Fatigue     Flatulence/gas pain/belching     Frequent use of laxatives     Hearing loss     Heart palpitations     Hemorrhoids     High blood pressure     High  cholesterol     Hoarseness, chronic     Indigestion 03/01/2018    Irregular bowel habits     Leaking of urine     Loss of appetite     Other and unspecified hyperlipidemia     PAC (premature atrial contraction) 01/22/2015    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    Problems with swallowing     Sleep disturbance     Stool incontinence     Unspecified disorder of thyroid     Unspecified essential hypertension     Unspecified gastritis and gastroduodenitis without mention of hemorrhage     Unspecified hypothyroidism     Weight loss         Past Surgical History:   Past Surgical History:   Procedure Laterality Date    BACK SURGERY  5/22/14    C4-C7 ACDF     CATARACT      COLONOSCOPY  7/5/11    COLONOSCOPY      FLUOR GID & LOCLZJ NDL/CATH SPI DX/THER NJX  9/28/2012    Procedure: TRANSFORAMINAL EPIDURAL - LUMBAR;  Surgeon: Balbir Parks MD;  Location[facility]    FLUOR GID & LOCLZJ NDL/CATH SPI DX/THER NJX  4/28/2014    Procedure: INTRATHECAL PUMP TRIAL;  Surgeon: Balbir Parks MD;  Location: [facility]    FLUOR GID & LOCLZJ NDL/CATH SPI DX/THER NJX N/A 1/15/2015    Procedure: INTRATHECAL PUMP TRIAL;  Surgeon: Balbir Parks MD;  Location: [facility]    HERNIA SURGERY  2014    INJECTION, ANESTHETIC/STEROID, TRANSFORAMINAL EPIDURAL; LUMBAR/SACRAL, SINGLE LEVEL  9/7/2012    Procedure: TRANSFORAMINAL EPIDURAL - LUMBAR;  Surgeon: Ander Taylor MD;  Location: [facility]    INJECTION, ANESTHETIC/STEROID, TRANSFORAMINAL EPIDURAL; LUMBAR/SACRAL, SINGLE LEVEL  4/2/2013    Procedure: TRANSFORAMINAL EPIDURAL - LUMBAR;  Surgeon: Balbir Parks MD;  Location: [facility]    INJECTION, W/WO CONTRAST, DX/THERAPEUTIC SUBSTANCE, EPIDURAL/SUBARACHNOID; LUMBAR/SACRAL  9/28/2012    Procedure: TRANSFORAMINAL EPIDURAL - LUMBAR;  Surgeon: Balbir Parks MD;  Location: [facility]    INJECTION,  W/WO CONTRAST, DX/THERAPEUTIC SUBSTANCE, EPIDURAL/SUBARACHNOID; LUMBAR/SACRAL  4/28/2014    Procedure: INTRATHECAL PUMP TRIAL;  Surgeon: Balbir Parks MD;  Location: [facility]    INJECTION, W/WO CONTRAST, DX/THERAPEUTIC SUBSTANCE, EPIDURAL/SUBARACHNOID; LUMBAR/SACRAL N/A 1/15/2015    Procedure: INTRATHECAL PUMP TRIAL;  Surgeon: Balbir Parks MD;  Location: [facility]    LASER SURGERY OF EYE  6/09    cataract both eyes 2 weeks apart    M-SEDAJ BY  PHYS PERFRMG SVC 5+ YR  9/7/2012    Procedure: TRANSFORAMINAL EPIDURAL - LUMBAR;  Surgeon: Andre Taylor MD;  Location: [facility]    M-SEDAJ BY  PHYS PERFRMG SVC 5+ YR  12/18/2013    Procedure: STIMULATOR TRIAL EPIDURAL LEAD;  Surgeon: Balbir Parks MD;  Location: [facility]    M-SEDAJ BY  PHYS PERFRMG SVC 5+ YR  2/3/2014    Procedure: STIMULATOR TRIAL PERIPHERAL;  Surgeon: Balbir Parks MD;  Location: [facility]    M-SEDAJ BY  PHYS PERFRMG SVC 5+ YR  4/28/2014    Procedure: INTRATHECAL PUMP TRIAL;  Surgeon: Balbir Parks MD;  Location: [facility]    NEEDLE BIOPSY LIVER      ORTHOPEDIC SURG (PBP)  5/2013 8/2013    Lumbar 4 - 5     OTHER  08    esophagogastroduodenoscopy    OTHER      jaw fracture/repair, hardware remains    PATIENT DOCUMENTED NOT TO HAVE EXPERIENCED ANY OF THE FOLLOWING EVENTS  4/2/2013    Procedure: TRANSFORAMINAL EPIDURAL - LUMBAR;  Surgeon: Balbir Parks MD;  Location: [facility]    PATIENT DOCUMENTED NOT TO HAVE EXPERIENCED ANY OF THE FOLLOWING EVENTS  12/18/2013    Procedure: STIMULATOR TRIAL EPIDURAL LEAD;  Surgeon: Balbir Parks MD;  Location: [facility]    PATIENT DOCUMENTED NOT TO HAVE EXPERIENCED ANY OF THE FOLLOWING EVENTS  2/3/2014    Procedure: STIMULATOR TRIAL PERIPHERAL;  Surgeon: Balbir Parks MD;  Location: [facility]    PATIENT  DOCUMENTED NOT TO HAVE EXPERIENCED ANY OF THE FOLLOWING EVENTS  4/28/2014    Procedure: INTRATHECAL PUMP TRIAL;  Surgeon: Balbir Parks MD;  Location: [facility]    PATIENT DOCUMENTED NOT TO HAVE EXPERIENCED ANY OF THE FOLLOWING EVENTS N/A 1/15/2015    Procedure: INTRATHECAL PUMP TRIAL;  Surgeon: Balbir Parks MD;  Location: [facility] [place] [facility]    PATIENT WITH PREOPERATIVE ORDER FOR IV ANTIBIOTIC SURGICAL SITE INFECT  2/3/2014    Procedure: STIMULATOR TRIAL PERIPHERAL;  Surgeon: Balbir Parks MD;  Location: [facility]    PATIENT WITHOUGH PREOPERATIVE ORDER FOR IV ANTIBIOTIC SURGICAL SITE INFECTION PROPHYLAXIS.  4/2/2013    Procedure: TRANSFORAMINAL EPIDURAL - LUMBAR;  Surgeon: Balbir Parks MD;  Location: [facility]    PATIENT WITHOUGH PREOPERATIVE ORDER FOR IV ANTIBIOTIC SURGICAL SITE INFECTION PROPHYLAXIS.  12/18/2013    Procedure: STIMULATOR TRIAL EPIDURAL LEAD;  Surgeon: Balbir Parks MD;  Location: [facility]    PATIENT WITHOUGH PREOPERATIVE ORDER FOR IV ANTIBIOTIC SURGICAL SITE INFECTION PROPHYLAXIS.  4/28/2014    Procedure: INTRATHECAL PUMP TRIAL;  Surgeon: Balbir Parks MD;  Location: [facility]    PATIENT WITHOUGH PREOPERATIVE ORDER FOR IV ANTIBIOTIC SURGICAL SITE INFECTION PROPHYLAXIS. N/A 1/15/2015    Procedure: INTRATHECAL PUMP TRIAL;  Surgeon: Balbir Parks MD;  Location: [facility]    PERCUT IMPLNT NEUROELEC,[facility]  2/3/2014    Procedure: STIMULATOR TRIAL PERIPHERAL;  Surgeon: Balbir Parks MD;  Location: [facility]    PERCUT IMPLNT NEUROELEC,[facility]  2/3/2014    Procedure: STIMULATOR TRIAL PERIPHERAL;  Surgeon: Balbir Parks MD;  Location: [facility]    PERCUT IMPLNT NEUROELEC,[facility]  2/3/2014    Procedure: STIMULATOR TRIAL PERIPHERAL;  Surgeon: Balbir Parks MD;  Location: [facility]    PERCUT  IMPLNT NEUROELEC,PERIPH  2/3/2014    Procedure: STIMULATOR TRIAL PERIPHERAL;  Surgeon: Balbir Parks MD;  Location: [facility]    PERCUT IMPLNT NEUROELECT,EPIDURAL  12/18/2013    Procedure: STIMULATOR TRIAL EPIDURAL LEAD;  Surgeon: Balbir Parks MD;  Location: [facility]    PERCUT IMPLNT NEUROELECT,EPIDURAL  12/18/2013    Procedure: STIMULATOR TRIAL EPIDURAL LEAD;  Surgeon: Balbir Parks MD;  Location: [facility]    SIGMOIDOSCOPY,DIAGNOSTIC      SPINE SURGERY PROCEDURE UNLISTED      CERVICAL FUSION X3/HARDWARE, lumbar 4-5 fusion     TONSILLECTOMY      UPPER GI ENDOSCOPY,EXAM         Social History:  reports that he has never smoked. He has never used smokeless tobacco. He reports that he does not drink alcohol and does not use drugs.    Family History:   Family History   Problem Relation Age of Onset    Hypertension Father     Heart Disorder Father     Hypertension Mother     Heart Disorder Mother        Allergies:   Allergies   Allergen Reactions    Neomycin SWELLING and PAIN     Polymyxin-bacitracin OINT, local swelling at site, ear swelling    Neomycin PAIN and SWELLING     Polymyxin-bacitracin OINT, local swelling at site, ear swelling    Bactrim [Sulfamethoxazole W/Trimethoprim]      Dry mouth and stomach cramping    Sulfa Antibiotics     Versed      Dizzy     Flonase [Fluticasone Propionate] FATIGUE     SUSP       Medications:    Current Facility-Administered Medications on File Prior to Encounter   Medication Dose Route Frequency Provider Last Rate Last Admin    [COMPLETED] OLANZapine (Zyprexa) 10 mg in sterile water for injection (PF) IM injection  10 mg Intramuscular Once Doris Olvera MD   10 mg at 12/11/23 1942    [COMPLETED] LORazepam (Ativan) 2 mg/mL injection 1 mg  1 mg Intravenous Once Doris Olvera MD   1 mg at 12/11/23 1954    [COMPLETED] haloperidol lactate (Haldol) 5 MG/ML injection 5 mg  5 mg Intravenous Once Doris Olvera MD   5  mg at 12/11/23 1955     Current Outpatient Medications on File Prior to Encounter   Medication Sig Dispense Refill    levothyroxine 88 MCG Oral Tab Take 1 tablet (88 mcg total) by mouth before breakfast. 90 tablet 0    levothyroxine 100 MCG Oral Tab Take 1 tablet (100 mcg total) by mouth daily. 90 tablet 3    [START ON 1/12/2024] mirtazapine 7.5 MG Oral Tab Take 1 tablet (7.5 mg total) by mouth nightly. 30 tablet 2    pantoprazole 40 MG Oral Tab EC Take 1 tablet (40 mg total) by mouth before breakfast. 90 tablet 3    atorvastatin 10 MG Oral Tab Take 1 tablet (10 mg total) by mouth nightly. 30 tablet 0    Insulin Syringe-Needle U-100 (BD INSULIN SYRINGE ULTRAFINE) 31G X 5/16\" 1 ML Does not apply Misc Use for B12 injection 10 each 2    docusate sodium 100 MG Oral Cap Take 1 capsule (100 mg total) by mouth 2 (two) times daily.      sennosides 8.6 MG Oral Tab Take 2 tablets (17.2 mg total) by mouth daily as needed (constipation).      artificial saliva substitute Mouth/Throat Solution Take 10 mL by mouth in the morning, at noon, and at bedtime.      ondansetron (ZOFRAN) 4 mg tablet Take 1 tablet (4 mg total) by mouth every 8 (eight) hours as needed for Nausea.      albuterol 108 (90 Base) MCG/ACT Inhalation Aero Soln Inhale 2 puffs into the lungs every 6 (six) hours as needed for Wheezing.      fentaNYL 25 MCG/HR Transdermal Patch 72 Hr Place 1 patch onto the skin every 3 (three) days.      cyanocobalamin 1000 MCG/ML Injection Solution Inject 1 mL (1,000 mcg total) into the muscle every 30 (thirty) days. 3 mL 9    Naloxone HCl 4 MG/0.1ML Nasal Liquid 4 mg by Nasal route as needed. If patient remains unresponsive, repeat dose in other nostril 2-5 minutes after first dose. 1 kit 0    acetaminophen 500 MG Oral Tab Take 1 tablet (500 mg total) by mouth every 6 (six) hours as needed for Pain.      psyllium 28 % Oral Powd Pack Take 1 packet by mouth 2 (two) times daily. 15 packet 0    Melatonin 10 MG Oral Cap Take 6 mg by  mouth nightly as needed.      Vitamin D3 (VITAMIN D3) 2000 UNITS Oral Cap Take 1 capsule (2,000 Units total) by mouth daily.         Review of Systems:   A comprehensive 14 point review of systems was completed.    Pertinent positives and negatives noted in the HPI.    Physical Exam:    BP (!) 141/92   Pulse 85   Temp 98.2 °F (36.8 °C) (Temporal)   Resp 20   SpO2 98%   General: No acute distress. Alert and oriented x 3.  HEENT: Normocephalic atraumatic. Moist mucous membranes. EOM-I. PERRLA. Anicteric.  Neck: No lymphadenopathy. No JVD. No carotid bruits.  Respiratory: Clear to auscultation bilaterally. No wheezes. No rhonchi.  Cardiovascular: S1, S2. Regular rate and rhythm. No murmurs, rubs or gallops. Equal pulses.   Chest and Back: No tenderness or deformity.  Abdomen: Soft, nontender, nondistended.  Positive bowel sounds. No rebound, guarding or organomegaly.  Neurologic: No focal neurological deficits. CNII-XII grossly intact.  Musculoskeletal: Moves all extremities.  Extremities: No edema or cyanosis.  Integument: No rashes or lesions.   Psychiatric: Appropriate mood and affect.    Diagnostic Data:      Labs:  Recent Labs   Lab 01/06/24  1647 01/06/24  1649   WBC 4.4  --    HGB 12.3*  --    MCV 92.4  --    .0  --    INR  --  1.77*       Recent Labs   Lab 01/06/24  1647   *   BUN 11   CREATSERUM 1.05   CA 8.2*   ALB 3.1*   *   K 4.3      CO2 31.0   ALKPHO 63   AST 10*   ALT 16   BILT 0.9   TP 6.0*       Estimated Creatinine Clearance: 38.6 mL/min (based on SCr of 1.05 mg/dL).    Recent Labs   Lab 01/06/24  1649   PTP 20.8*   INR 1.77*       No results for input(s): \"TROP\", \"CK\" in the last 168 hours.    Imaging: Imaging data reviewed in Epic.  CT ABDOMEN PELVIS IV CONTRAST, NO ORAL (ER)    Result Date: 1/6/2024  CONCLUSION:  1. The common duct is dilated measuring up to 1.3 cm.  No definite obstructing lesions are identified on CT.  This may be further evaluated with ERCP if  indicated. 2. Lumbar fusion with findings concerning for hardware failure around the left L5 pedicle screw.   LOCATION:  [facility]   Dictated by (CST): Germán Granados MD on 1/06/2024 at 6:17 PM     Finalized by (CST): Germán Granados MD on 1/06/2024 at 6:28 PM       XR ELBOW, COMPLETE (MIN 3 VIEWS), LEFT (CPT=73080)    Result Date: 1/6/2024  CONCLUSION:  No acute abnormality in the left elbow.   LOCATION:  [facility]    Dictated by (CST): Germán Granados MD on 1/06/2024 at 6:01 PM     Finalized by (CST): Germán Granados MD on 1/06/2024 at 6:01 PM       CT BRAIN OR HEAD (18021)    Result Date: 1/6/2024  CONCLUSION:  No acute intracranial abnormality.  Mild left parietal scalp soft tissue swelling    LOCATION:  [facility]   Dictated by (CST): Germán Granados MD on 1/06/2024 at 5:58 PM     Finalized by (CST): Germán Granados MD on 1/06/2024 at 6:01 PM       XR CHEST AP PORTABLE  (CPT=71045)    Result Date: 1/6/2024  CONCLUSION:  Mild patchy interstitial opacity in the lungs may represent infiltrate.  Correlate clinically.   LOCATION:  [facility]      Dictated by (CST): Germán Granados MD on 1/06/2024 at 5:57 PM     Finalized by (CST): Germán Granados MD on 1/06/2024 at 5:57 PM       CT SPINE CERVICAL (CPT=72125)    Result Date: 1/6/2024  CONCLUSION:  1. No acute abnormality in the cervical spine.  2. Degenerative changes in the cervical spine as above.    LOCATION:  [facility]   Dictated by (CST): Germán Granados MD on 1/06/2024 at 5:48 PM     Finalized by (CST): Germán Granados MD on 1/06/2024 at 5:56 PM          ASSESSMENT / PLAN:     # Recurrent fall/syncope  # Severe orthostatic hypotension   - Pt with multiple admissions for falls and syncope, thought orthostatic vs. D/t polypharmacy   - Notes that since recent admission has not been taking Klonopin,   - CTH, C-spine without acute pathology noted   - Orthostatics ordered   - Will start Fludrocortisone, compression stockings for empiric treatment of orthostatsis   - EKG non-ischemic, similar to prior,  Trop negative   - IVF   - TTE from 11/20/2023 reviewed, no acute abnormalities noted    - PT/OT/SW    # Hx Esophageal stricture s/p balloon dilation    - We contributing to patient's orthostasis as well given poor p.o. intake at home, cachectic and thin   - Notes that he has seen GI in the past and has had dilatation however symptoms reoccurred and has not followed up with GI   - Recommend repeat GI follow-up as outpatient    # CBD dilation   - LFTs WNL   - MRCP ordered    # suspected lumbar fusion hardware failure?    - NSYG consulted     # Chronic pain   - continue home fentanyl patch    # Hyponatremia - IVF, trend  # Hyperlipidemia - continue home statin   # GERD - continue PPI  # Hypothyroidism - continue home levothyroxine       Code Status: Full Code    Plan of care discussed with patient, ED physician    Don Dunlap MD  1/6/2024      Supplementary Documentation:      MDM : Patient's ER labs, imaging reviewed.  A.m. labs ordered.  ER management discussed with ED physician, decision made for patient to be admitted to the hospital for further medical management.

## 2024-01-07 NOTE — PROGRESS NOTES
01/07/24 0626 01/07/24 0627 01/07/24 0628   Vital Signs   BP (!) 162/92 149/84 128/86   MAP (mmHg) (!) 108 (!) 101 93   BP Location Left arm Left arm Left arm   BP Method Automatic Automatic Automatic   Patient Position Lying Sitting Standing     Patient c/o dizziness from sitting to standing.

## 2024-01-07 NOTE — PROGRESS NOTES
NURSING ADMISSION NOTE        Patient admitted via Cart  Oriented to room.  Safety precautions initiated.  Bed in low position.  Call light in reach.    Assumed care for this patient at 2030. Admission navigator/PTA med list complete. Patient alert and oriented x4, forgetful. Up with assist/walker, C/o dizziness with ambulation. PT/OT to eval. NSR on tele. Maintaining o2 sats on RA. Fentanyl patch to back. Briefed/incontinent at times, uses urinal. C/o pain, prn tylenol given with relief. Patient with hx of difficulty swallowing. Per patient, pureed diet at home, speech to see. Updated patient on POC, verbalized understanding. Safety precautions put in place, bed alarm on. Plan is for MRI/ERCP. Neurosurgery consulted.

## 2024-01-07 NOTE — PROGRESS NOTES
[facility]     Hospitalist Progress Note     Ozzie Zuleta Patient Status:  Observation    3/8/1940 MRN [id number]   [facility] 8NE-A Attending La Gonzales, DO   Hosp Day # 0 PCP Ozzie Alfaro MD     Chief Complaint: dizziness, fall, LOC    Subjective:     Patient feeling better this morning, not feeling dizzy while resting in bed, but felt some dizziness overnight when orthostatics checked by staff     Objective:    Review of Systems:   A comprehensive review of systems was completed; pertinent positive and negatives stated in subjective.    Vital signs:  Temp:  [98.1 °F (36.7 °C)-98.4 °F (36.9 °C)] 98.1 °F (36.7 °C)  Pulse:  [69-86] 72  Resp:  [16-20] 18  BP: (128-171)/() 128/86  SpO2:  [96 %-98 %] 97 %    Physical Exam:    General: No acute distress; frail, cachetic appearing with temporal wasting  Respiratory: no wheezes, no rhonchi  Cardiovascular: S1, S2, regular rate and rhythm  Abdomen: Soft, Non-tender, non-distended, positive bowel sounds  Neuro: No new focal deficits.   Extremities: no edema      Diagnostic Data:    Labs:  Recent Labs   Lab 24  0543   WBC 4.4  --  6.0   HGB 12.3*  --  11.9*   MCV 92.4  --  93.3   .0  --  167.0   INR  --  1.77* 1.13       Recent Labs   Lab 24  0543   * 92   BUN 11 8*   CREATSERUM 1.05 0.80   CA 8.2* 8.5   ALB 3.1* 3.1*   * 138   K 4.3 3.9    106   CO2 31.0 29.0   ALKPHO 63 61   AST 10* 14*   ALT 16 11*   BILT 0.9 1.1   TP 6.0* 5.5*       Estimated Creatinine Clearance: 49.1 mL/min (based on SCr of 0.8 mg/dL).    Recent Labs   Lab 24  164   TROPHS 8       Recent Labs   Lab 24  0543   PTP 20.8* 14.5   INR 1.77* 1.13                  Microbiology    No results found for this visit on 24.      Imaging: Reviewed in Epic.    Medications:    levothyroxine  88 mcg Oral QAM AC    atorvastatin  10 mg Oral Nightly     pantoprazole  40 mg Oral Before breakfast    enoxaparin  40 mg Subcutaneous Daily    fentaNYL  1 patch Transdermal Q3 Days    fludrocortisone  0.1 mg Oral Daily       Assessment & Plan:      # Recurrent fall/syncope  # Severe orthostatic hypotension   - EKG non-ischemic, similar to prior, Trop negative   - Pt with multiple admissions for falls and syncope, thought orthostatic vs. D/t polypharmacy   - Notes that since recent admission has not been taking Klonopin,   - CTH, C-spine without acute pathology noted   - Orthostatics positive   - started on Fludrocortisone, compression stockings for empiric treatment of orthostatsis   - IVF   - TTE from 11/20/2023 reviewed, no acute abnormalities noted    - PT/OT/SW     # Hx Esophageal stricture s/p balloon dilation    - possibly contributing to patient's orthostasis as well given poor p.o. intake at home, cachectic and thin   - Notes that he has seen GI in the past and has had dilatation however symptoms reoccurred and has not followed up with GI   - Recommend repeat GI follow-up as outpatient  - SLP eval     # CBD dilation   - LFTs WNL   - MRCP unremarkable      # lumbar fusion hardware    - NSYG consulted  > no NS intervention or f/u necessary      # Chronic pain   - continue home fentanyl patch     # Hyponatremia - IVF, trend  # Hyperlipidemia - continue home statin   # GERD - continue PPI  # Hypothyroidism - continue home levothyroxine       La Gonzales,     Supplementary Documentation:     Quality:  DVT Mechanical Prophylaxis:   SCDs, Early ambuation  DVT Pharmacologic Prophylaxis   Medication    enoxaparin (Lovenox) 40 MG/0.4ML SUBQ injection 40 mg                Code Status: Full Code  Mares: No urinary catheter in place  Mares Duration (in days):   Central line:    SATHYA:     Discharge is dependent on: clinical state  At this point Mr. Zuleta is expected to be discharge to: TBD, PT/OT eval pending     The 21st Century Cures Act makes medical notes like  these available to patients in the interest of transparency. Please be advised this is a medical document. Medical documents are intended to carry relevant information, facts as evident, and the clinical opinion of the practitioner. The medical note is intended as peer to peer communication and may appear blunt or direct. It is written in medical language and may contain abbreviations or verbiage that are unfamiliar.                    Comment: Favor flare of rosacea over acne, patient has been off of Lupron for 9 months now. Discussed options in detail today. Start below regimen. Render Risk Assessment In Note?: no Detail Level: Simple Comment: Patient previously tried valacyclovir for breakouts but feels that acyclovir works better so acyclovir filled today. \\n\\nDiscussed that if developing >6 breakouts per year, then recommend daily suppressive therapy. Patient declined for now and prefers therapy only as needed for breakouts. Detail Level: Detailed Comment: Mild severity, patient declined topical therapy. Prefers to start with oral doxycycline

## 2024-01-07 NOTE — CONSULTS
[facility]  Neurological Surgery Consult Note    Ozzie Zuleta  3/8/1940  [id number]  PCP: Ozzie Alfaro MD  Consulting Provider: Don Dunlap MD    REASON FOR CONSULT:  Concern for lumbar hardware failure    HISTORY OF PRESENT ILLNESS:  Ozzie Zuleta is a(n) 83 year old male with hypertension, hyperlipidemia, GERD, recurrent falls, chronic benzo use, history of esophageal stricture status post balloon dilation, hypothyroidism who presents status post syncopal fall.  His fall occurred yesterday.  He has had multiple admissions for the same, with negative cardiac workups.  Yesterday he was at home walking with a walker when he started feeling lightheaded and dizzy and passed out.  He hit his head on the linoleum floor and regain consciousness of about a minute after per report.  He has had neck and lower back pain since the fall.  Trauma imaging was obtained, including CT brain, cervical which were negative.  CT abdomen pelvis with contrast was interpreted as concerning for hardware failure.  He has a history of an L4-5 TLIF performed an outside hospital over a decade ago.  Neurosurgery consulted for this finding.    PAST MEDICAL HISTORY:  Past Medical History:   Diagnosis Date    Abdominal distention     Abdominal pain     Acute encephalopathy     Anemia     Back pain     Back problem     back and neck pain    Black stools     Bladder incontinence     Bloating     Community acquired pneumonia of right middle lobe of lung     Constipation     Diarrhea, unspecified     Disorder of prostate     Dizziness     Esophageal reflux     Exposure to TB     Fatigue     Flatulence/gas pain/belching     Frequent use of laxatives     Hearing loss     Heart palpitations     Hemorrhoids     High blood pressure     High cholesterol     Hoarseness, chronic     Incontinence     Indigestion 03/01/2018    Irregular bowel habits     Leaking of urine     Loss of appetite     Other and unspecified  hyperlipidemia     PAC (premature atrial contraction) 01/22/2015    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    Problems with swallowing     Sleep disturbance     Stool incontinence     Unspecified disorder of thyroid     Unspecified essential hypertension     Unspecified gastritis and gastroduodenitis without mention of hemorrhage     Unspecified hypothyroidism     Weight loss      PAST SURGICAL HISTORY:  Past Surgical History:   Procedure Laterality Date    BACK SURGERY  5/22/14    C4-C7 ACDF     CATARACT      COLONOSCOPY  7/5/11    COLONOSCOPY      FLUOR GID & LOCLZJ NDL/CATH SPI DX/THER NJX  9/28/2012    Procedure: TRANSFORAMINAL EPIDURAL - LUMBAR;  Surgeon: Balbir Parks MD;  [place] [facility]    FLUOR GID & LOCLZJ NDL/CATH SPI DX/THER NJX  4/28/2014    Procedure: INTRATHECAL PUMP TRIAL;  Surgeon: Balbir Parks MD;  Location: [facility]    FLUOR GID & LOCLZJ NDL/CATH SPI DX/THER NJX N/A 1/15/2015    Procedure: INTRATHECAL PUMP TRIAL;  Surgeon: Balbir Parks MD;  Location: [facility]    HERNIA SURGERY  2014    INJECTION, ANESTHETIC/STEROID, TRANSFORAMINAL EPIDURAL; LUMBAR/SACRAL, SINGLE LEVEL  9/7/2012    Procedure: TRANSFORAMINAL EPIDURAL - LUMBAR;  Surgeon: Andre Taylor MD;  Location: [facility]    INJECTION, ANESTHETIC/STEROID, TRANSFORAMINAL EPIDURAL; LUMBAR/SACRAL, SINGLE LEVEL  4/2/2013    Procedure: TRANSFORAMINAL EPIDURAL - LUMBAR;  Surgeon: Balbir Parks MD;  Location: [facility]    INJECTION, W/WO CONTRAST, DX/THERAPEUTIC SUBSTANCE, EPIDURAL/SUBARACHNOID; LUMBAR/SACRAL  9/28/2012    Procedure: TRANSFORAMINAL EPIDURAL - LUMBAR;  Surgeon: Balbir Parks MD;  Location: [facility]    INJECTION, W/WO CONTRAST, DX/THERAPEUTIC SUBSTANCE, EPIDURAL/SUBARACHNOID; LUMBAR/SACRAL  4/28/2014    Procedure: INTRATHECAL PUMP TRIAL;  Surgeon: Balbir Parks MD;   Location: [facility]    INJECTION, W/WO CONTRAST, DX/THERAPEUTIC SUBSTANCE, EPIDURAL/SUBARACHNOID; LUMBAR/SACRAL N/A 1/15/2015    Procedure: INTRATHECAL PUMP TRIAL;  Surgeon: Balbir Parks MD;  Location: [facility]    LASER SURGERY OF EYE  6/09    cataract both eyes 2 weeks apart    [facility]  9/7/2012    Procedure: TRANSFORAMINAL EPIDURAL - LUMBAR;  Surgeon: Andre Taylor MD;  Location: [facility]    [facility]  12/18/2013    Procedure: STIMULATOR TRIAL EPIDURAL LEAD;  Surgeon: Balbir Parks MD;  Location: [facility]    M-SEDAJ BY [facility] 5+ YR  2/3/2014    Procedure: STIMULATOR TRIAL PERIPHERAL;  Surgeon: Balbir Parks MD;  Location: [facility]    M-SEDAJ BY [facility] 5+ YR  4/28/2014    Procedure: INTRATHECAL PUMP TRIAL;  Surgeon: Balbir Parks MD;  Location: [facility]    NEEDLE BIOPSY LIVER      ORTHOPEDIC SURG (PBP)  5/2013 8/2013    Lumbar 4 - 5     OTHER  08    esophagogastroduodenoscopy    OTHER      jaw fracture/repair, hardware remains    PATIENT DOCUMENTED NOT TO HAVE EXPERIENCED ANY OF THE FOLLOWING EVENTS  4/2/2013    Procedure: TRANSFORAMINAL EPIDURAL - LUMBAR;  Surgeon: Balbir Parks MD;  Location: [facility]    PATIENT DOCUMENTED NOT TO HAVE EXPERIENCED ANY OF THE FOLLOWING EVENTS  12/18/2013    Procedure: STIMULATOR TRIAL EPIDURAL LEAD;  Surgeon: Balbir Parks MD;  Location: [facility]    PATIENT DOCUMENTED NOT TO HAVE EXPERIENCED ANY OF THE FOLLOWING EVENTS  2/3/2014    Procedure: STIMULATOR TRIAL PERIPHERAL;  Surgeon: Balbir Parks MD;  Location: [facility]    PATIENT DOCUMENTED NOT TO HAVE EXPERIENCED ANY OF THE FOLLOWING EVENTS  4/28/2014    Procedure: INTRATHECAL PUMP TRIAL;  Surgeon: Balbir Parks MD;  Location: [facility]  FOR [facility]IN [facility]    PATIENT DOCUMENTED NOT TO HAVE EXPERIENCED ANY OF THE FOLLOWING EVENTS N/A 1/15/2015    Procedure: INTRATHECAL PUMP TRIAL;  Surgeon: Balbir Parks MD;  [place] [facility]    PATIENT WITH PREOPERATIVE ORDER FOR IV ANTIBIOTIC SURGICAL SITE INFECT  2/3/2014    Procedure: STIMULATOR TRIAL PERIPHERAL;  Surgeon: Balbir Parks MD;  Location: [facility]    PATIENT WITHOUGH PREOPERATIVE ORDER FOR IV ANTIBIOTIC SURGICAL SITE INFECTION PROPHYLAXIS.  4/2/2013    Procedure: TRANSFORAMINAL EPIDURAL - LUMBAR;  Surgeon: Balbir Parks MD;  [place] [facility]    PATIENT WITHOUGH PREOPERATIVE ORDER FOR IV ANTIBIOTIC SURGICAL SITE INFECTION PROPHYLAXIS.  12/18/2013    Procedure: STIMULATOR TRIAL EPIDURAL LEAD;  Surgeon: Balbir Parks MD;  Location: [facility]    PATIENT WITHOUGH PREOPERATIVE ORDER FOR IV ANTIBIOTIC SURGICAL SITE INFECTION PROPHYLAXIS.  4/28/2014    Procedure: INTRATHECAL PUMP TRIAL;  Surgeon: Balbir Parks MD;  Location: [facility]    PATIENT WITHOUGH PREOPERATIVE ORDER FOR IV ANTIBIOTIC SURGICAL SITE INFECTION PROPHYLAXIS. N/A 1/15/2015    Procedure: INTRATHECAL PUMP TRIAL;  Surgeon: Balbir Parks MD;  Location: [facility]    PERCUT IMPLNT NEUROELEC,[facility]  2/3/2014    Procedure: STIMULATOR TRIAL PERIPHERAL;  Surgeon: Balbir Parks MD;  Location: [facility]    PERCUT IMPLNT NEUROELEC,[facility]  2/3/2014    Procedure: STIMULATOR TRIAL PERIPHERAL;  Surgeon: Balbir Parks MD;  Location: [facility]    PERCUT IMPLNT NEUROELEC,[facility]  2/3/2014    Procedure: STIMULATOR TRIAL PERIPHERAL;  Surgeon: Balbir Parks MD;  Location: [facility]    PERCUT IMPLNT NEUROELEC,[facility]  2/3/2014    Procedure: STIMULATOR TRIAL PERIPHERAL;  Surgeon: Balbir Parks MD;  Location: [facility]    PERCUT  IMPLNT NEUROELECT,EPIDURAL  12/18/2013    Procedure: STIMULATOR TRIAL EPIDURAL LEAD;  Surgeon: Balbir Parks MD;  Location: [facility]    PERCUT IMPLNT NEUROELECT,EPIDURAL  12/18/2013    Procedure: STIMULATOR TRIAL EPIDURAL LEAD;  Surgeon: Balbir Parks MD;  Location: [facility]    SIGMOIDOSCOPY,DIAGNOSTIC      SPINE SURGERY PROCEDURE UNLISTED      CERVICAL FUSION X3/HARDWARE, lumbar 4-5 fusion     TONSILLECTOMY      UPPER GI ENDOSCOPY,EXAM       FAMILY HISTORY:  family history includes Heart Disorder in his father and mother; Hypertension in his father and mother.    SOCIAL HISTORY:   reports that he has never smoked. He has never used smokeless tobacco. He reports that he does not drink alcohol and does not use drugs.    ALLERGIES:  Allergies   Allergen Reactions    Neomycin SWELLING and PAIN     Polymyxin-bacitracin OINT, local swelling at site, ear swelling    Neomycin PAIN and SWELLING     Polymyxin-bacitracin OINT, local swelling at site, ear swelling    Bactrim [Sulfamethoxazole W/Trimethoprim]      Dry mouth and stomach cramping    Sulfa Antibiotics     Versed      Dizzy     Flonase [Fluticasone Propionate] FATIGUE     SUSP       MEDICATIONS:  No current facility-administered medications on file prior to encounter.     Current Outpatient Medications on File Prior to Encounter   Medication Sig Dispense Refill    levothyroxine 88 MCG Oral Tab Take 1 tablet (88 mcg total) by mouth before breakfast. 90 tablet 0    [START ON 1/12/2024] mirtazapine 7.5 MG Oral Tab Take 1 tablet (7.5 mg total) by mouth nightly. 30 tablet 2    pantoprazole 40 MG Oral Tab EC Take 1 tablet (40 mg total) by mouth before breakfast. 90 tablet 3    atorvastatin 10 MG Oral Tab Take 1 tablet (10 mg total) by mouth nightly. 30 tablet 0    fentaNYL 25 MCG/HR Transdermal Patch 72 Hr Place 1 patch onto the skin every 3 (three) days.      levothyroxine 100 MCG Oral Tab Take 1 tablet (100 mcg total) by  mouth daily. (Patient not taking: Reported on 1/6/2024) 90 tablet 3    Insulin Syringe-Needle U-100 (BD INSULIN SYRINGE ULTRAFINE) 31G X 5/16\" 1 ML Does not apply Misc Use for B12 injection 10 each 2    docusate sodium 100 MG Oral Cap Take 1 capsule (100 mg total) by mouth 2 (two) times daily.      sennosides 8.6 MG Oral Tab Take 2 tablets (17.2 mg total) by mouth daily as needed (constipation).      artificial saliva substitute Mouth/Throat Solution Take 10 mL by mouth in the morning, at noon, and at bedtime.      ondansetron (ZOFRAN) 4 mg tablet Take 1 tablet (4 mg total) by mouth every 8 (eight) hours as needed for Nausea.      albuterol 108 (90 Base) MCG/ACT Inhalation Aero Soln Inhale 2 puffs into the lungs every 6 (six) hours as needed for Wheezing.      cyanocobalamin 1000 MCG/ML Injection Solution Inject 1 mL (1,000 mcg total) into the muscle every 30 (thirty) days. 3 mL 9    Naloxone HCl 4 MG/0.1ML Nasal Liquid 4 mg by Nasal route as needed. If patient remains unresponsive, repeat dose in other nostril 2-5 minutes after first dose. 1 kit 0    acetaminophen 500 MG Oral Tab Take 1 tablet (500 mg total) by mouth every 6 (six) hours as needed for Pain.      psyllium 28 % Oral Powd Pack Take 1 packet by mouth 2 (two) times daily. 15 packet 0    Melatonin 10 MG Oral Cap Take 6 mg by mouth nightly as needed. (Patient not taking: Reported on 1/6/2024)      Vitamin D3 (VITAMIN D3) 2000 UNITS Oral Cap Take 1 capsule (2,000 Units total) by mouth daily.       REVIEW OF SYSTEMS:  All other systems were reviewed and were negative except for those previously mentioned in the HPI    PHYSICAL EXAMINATION:  General: No acute distress.  Respiratory: Non-labored respirations bilaterally. No audible wheezing  Cardiovascular: Extremities warm and well-perfused.  Abdomen: Soft, nontender, nondistended.   Musculoskeletal: Moves all extremities well, symmetrically.  Extremities: No edema.    NEUROLOGIC EXAMINATION:  Mental status:  Alert and oriented x 3  Speech: Clear, fluent  Cranial nerves: PERRLA, EOMI, face symmetric, with normal strength and sensation, tongue and palate midline, SCM 5/5 bilaterally  Motor:     RIGHT  Delt 5/5   Bic 5/5  Tri 5/5   HI 5/5    5/5  IP 5/5   Quad 5/5   Ham 5/5   AT 5/5   EHL 5/5 [place] 5/5     LEFT    Delt 5/5   Bic 5/5  Tri 5/5   HI 5/5    5/5  IP 5/5   Quad 5/5   Ham 5/5   AT 5/5   EHL 5/5 [place] 5/5   No pronator drift  Tone: Normal  Atrophy/Fasciculations: None  Sensation: Normal to light touch, symmetric, no neglect    IMAGING:  CT head: No acute intracranial process. No new hemorrhage or hydrocephalus. No skull fractures.   CT c-spine: No acute fractures or subluxations.   CT a/p: Postsurgical changes related to L4-5 interbody and posterior lateral fusion. No evidence of hardware failure or pseudoarthrosis.  Haloing seen on sagittal views is artifactual and present on studies from the past.    ASSESSMENT:  Mr. Zuleta presents status post syncopal fall resulting in neck and back pain.  CT abdomen pelvis originally interpreted as concerning for hardware failure.  The haloing that is seen on the sagittal views has been present for multiple years dating back to prior comparison studies and is purely artifactual as the axial cuts demonstrate no such thing.  There is also complete interbody fusion. Prior to his fall he did not have any significant low back pain as would be expected with pseudoarthrosis. Therefore, his presenting neck and back pain are likely musculoskeletal.  I instructed the patient to monitor and to contact our office for follow-up if this pain over time.     Plan:  -No neurosurgical interventions warranted  -No neurosurgical follow-up necessary    Jacek Mancia MD  Neurological Surgery    [address] , [address] [address]  [phone]  Pager [phone]  1/7/2024 2:18 PM      This note was created using a  voice-recognition transcribing system. Incorrect words or phrases may have been missed during proofreading. Please interpret accordingly.    Total Time    Consult total Time       60  minutes.      Activities       Preparing to see the patient (chart/tests/imaging review).       Obtaining and/or reviewing separately obtained history.       Performing a medically appropriate examination and/or evaluation.       Counseling and educating the patient/family/caregiver.       Ordering medications, tests, or procedures.       Referring and communicating with other health care professionals (when not separately reported).       Documenting clincal information in the electronic or other health record.       Independently interpreting results (not separately reported).    Communicating results to the patient/family/caregiver.    Care coordination (not separately reported).

## 2024-01-07 NOTE — PHYSICAL THERAPY NOTE
PHYSICAL THERAPY EVALUATION - INPATIENT     Room Number: 8626/8626-A  Evaluation Date: 1/7/2024  Type of Evaluation: Initial  Physician Order: PT Eval and Treat    Presenting Problem: Syncope/collapse-s/p fall c blunt head trauma  Co-Morbidities : GERD, hypothyroidism, HLD, recurrent falls, chronic benzo use, esophageal stricture, s/p balloon dilation  Reason for Therapy: Mobility Dysfunction and Discharge Planning    History related to current admission: Patient is a 83 year old male admitted on 1/6/2024 from home for s/p fall, syncope/collapse.  Pt diagnosed with Syncope/collapse-s/p fall c blunt head trauma.    CT abd/pelvis 1/6/24-CONCLUSION:    1. The common duct is dilated measuring up to 1.3 cm.  No definite obstructing lesions are identified on CT.  This may be further evaluated with ERCP if indicated.  2. Lumbar fusion with findings concerning for hardware failure around the left L5 pedicle screw.  3. Left lower lobe pulmonary nodule measuring 6 millimeters.  Dedicated nonemergent CT of the chest is recommended.    CT cx spine 1/6/24-CONCLUSION:    1. No acute abnormality in the cervical spine.    2. Degenerative changes in the cervical spine as above.    CT brain 1/6/24-CONCLUSION:  No acute intracranial abnormality.  Mild left parietal scalp soft tissue swelling    Xray L elbow 1/6/24-  CONCLUSION:  No acute abnormality in the left elbow.    ASSESSMENT   In this PT evaluation, the patient presents with the following impairments posture, endurance, strength, gait and balance.  These impairments and comorbidities manifest themselves as functional limitations in independent bed mobility, transfers, and gait, along c stoop negotiation.  The patient is below baseline and would benefit from skilled inpatient PT to address the above deficits to assist patient in returning to prior to level of function.   [facility]  The AM-PAC '6-Clicks' Inpatient Basic Mobility  Short Form was completed and this patient is demonstrating a Approx Degree of Impairment: 35.83%  degree of impairment in mobility.   Rec DC to home c HHPT and cont'd use of RW when medically appropriate. Pt may also benefit from additional support at home as he was commenting that him and his wife are not able to shower or appropriately sponge bathe.   DISCHARGE RECOMMENDATIONS  PT Discharge Recommendations: Home with home health PT    PLAN  PT Treatment Plan: Bed mobility;Body mechanics;Endurance;Energy conservation;Patient education;Family education;Gait training;Strengthening;Transfer training;Balance training  Rehab Potential : Good  Frequency (Obs): 3x/week  Number of Visits to Meet Established Goals: 5      CURRENT GOALS    Goal #1 Patient is able to demonstrate supine - sit EOB @ level: independent     Goal #2 Patient is able to demonstrate transfers EOB to/from Chair/Wheelchair at assistance level: modified independent     Goal #3 Patient is able to ambulate 250 feet with assist device: walker - rolling at assistance level: modified independent     Goal #4 Pt will demo ability to negotiate 1-2 steps to enter/exit his home c supervision by DC   Goal #5    Goal #6    Goal Comments: Goals established on 1/7/2024    HOME SITUATION  Type of Home: House   Home Layout: One level  Stairs to Enter : 2  Railing: Yes          Lives With: Spouse  Drives: No  Patient Owned Equipment: Rolling walker;Other (Comment) (grab bars)  Patient Regularly Uses: Reading glasses    Prior Level of [place]: Pt reporting that he is indep c dressing, however struggles c bathing-reporting that he and his wife have not been able to safely shower (sitting or standing) or sponge bathe for 'quite some time'. Pt amb using RW, however reports frequent falling d/t these random syncopal events; family or neighbors assist c grocery shopping and driving them to appt's    SUBJECTIVE  \"I haven't had anything to eat so I am  weak!\"      OBJECTIVE  Precautions: Bed/chair alarm;Other (Comment) (monitor orthostatics)  Fall Risk: High fall risk    WEIGHT BEARING RESTRICTION  Weight Bearing Restriction: None                PAIN ASSESSMENT  Rating: Unable to rate  Location: head  Management Techniques: Activity promotion;Body mechanics;Repositioning    COGNITION  Overall Cognitive Status:  WFL - within functional limits  Arousal/Alertness:  appropriate responses to stimuli  Orientation Level:  oriented x4  Following Commands:  follows all commands and directions without difficulty  Safety Judgement:  good awareness of safety precautions    RANGE OF MOTION AND STRENGTH ASSESSMENT  Upper extremity ROM and strength are within functional limits     Lower extremity ROM is within functional limits     Lower extremity strength is -BLE hip flex 4/5, quad 4+/5, ankle 4+/5      BALANCE  Static Sitting: Fair +  Dynamic Sitting: Fair +  Static Standing: Poor +  Dynamic Standing: Poor +    ADDITIONAL TESTS                                    ACTIVITY TOLERANCE                         O2 WALK       NEUROLOGICAL FINDINGS  Neurological Findings: Sensation           Sensation: BLE symmetrical/intact to light touch         AM-PAC '6-Clicks' INPATIENT SHORT FORM - BASIC MOBILITY  How much difficulty does the patient currently have...  Patient Difficulty: Turning over in bed (including adjusting bedclothes, sheets and blankets)?: None   Patient Difficulty: Sitting down on and standing up from a chair with arms (e.g., wheelchair, bedside commode, etc.): A Little   Patient Difficulty: Moving from lying on back to sitting on the side of the bed?: None   How much help from another person does the patient currently need...   Help from Another: Moving to and from a bed to a chair (including a wheelchair)?: A Little   Help from Another: Need to walk in hospital room?: A Little   Help from Another: Climbing 3-5 steps with a railing?: A Little       AM-PAC Score:  Raw  Score: 20   Approx Degree of Impairment: 35.83%   Standardized Score (AM-PAC Scale): 47.67   CMS Modifier (G-Code): CJ    FUNCTIONAL ABILITY STATUS  Gait Assessment   Functional Mobility/Gait Assessment  Gait Assistance: Supervision  Distance (ft): 75  Assistive Device: Rolling walker  Pattern: Shuffle;L Foot flat;R Foot flat;Comment (NBOS)    Skilled Therapy Provided     Bed Mobility:  Rolling: NT  Supine to sit: mod indep   Sit to supine: mod indep     Transfer Mobility:  Sit to stand: supervision   Stand to sit: supervision  Gait = supervision x75' c RW    Therapist's Comments: Pt presents to PT lying supine in bed. Pt is eager to participate, however reporting he has been very irritated that his syncopal events have occurred without warning. Pt t/f supine/sit c mod indep to the EOB, scoot and reposition safely. Denies any LH, SBP was 171. Pt provided c RW and able to sit/stand c supervision and RW. When in standing, he denies LH, however SBP was 118 (+orthostatic). As pt was asymptomatic, he was agreeable to attempt amb. Pt able to amb 75' c supervision and RW, however c above gait deviations. Following this, pt returned to his room and sat back on the EOB and supine c supervision. Pt was then brought down to MRI via transport and RN aware of session.     Exercise/Education Provided:  Bed mobility  Body mechanics  Functional activity tolerated  Gait training  Posture  Transfer training    Patient End of Session: In bed;Needs met;Call light within reach;RN aware of session/findings;All patient questions and concerns addressed;Alarm set      Patient Evaluation Complexity Level:  History Moderate - 1 or 2 personal factors and/or co-morbidities   Examination of body systems Low - addressing 1-2 elements   Clinical Presentation Low - Stable   Clinical Decision Making Low - Stable       PT Session Time: 30 minutes  Gait Trainin minutes  Therapeutic Activity: 5 minutes

## 2024-01-07 NOTE — PLAN OF CARE
Assumed care of patient at 0700. Pt A/Ox 4. No further bleeding to head laceration, staples intact. O2 sats maintained on room air. NSR on tele. Last BM 1/6 per pt. Voiding without difficulty. Pt reports no pain. Pt up x1 assist and walker. Pt updated on plan of care. Care needs met. Bed in lowest position, Call light within reach. Bed alarm on. Back from [facility] at 1545. Per Dr. Gonzales, pt can resume baseline pureed diet tonight since speech cannot see pt until tomorrow. Pt refusing new IV (field stick in place).     POC: SLP, PT/OT, [facility]    Problem: Patient/Family Goals  Goal: Patient/Family Long Term Goal  Description: Patient's Long Term Goal: discharge     Interventions:  - appropriate consults, taking prescribed meds  - See additional Care Plan goals for specific interventions  Outcome: Progressing  Goal: Patient/Family Short Term Goal  Description: Patient's Short Term Goal: feel better    Interventions:   - [facility]   - See additional Care Plan goals for specific interventions  Outcome: Progressing

## 2024-01-08 VITALS
RESPIRATION RATE: 16 BRPM | HEART RATE: 81 BPM | BODY MASS INDEX: 16 KG/M2 | WEIGHT: 113.31 LBS | TEMPERATURE: 98 F | DIASTOLIC BLOOD PRESSURE: 83 MMHG | SYSTOLIC BLOOD PRESSURE: 146 MMHG | OXYGEN SATURATION: 93 %

## 2024-01-08 PROCEDURE — 99239 HOSP IP/OBS DSCHRG MGMT >30: CPT | Performed by: INTERNAL MEDICINE

## 2024-01-08 RX ORDER — FLUDROCORTISONE ACETATE 0.1 MG/1
0.1 TABLET ORAL DAILY
Qty: 30 TABLET | Refills: 0 | Status: SHIPPED | OUTPATIENT
Start: 2024-01-08

## 2024-01-08 NOTE — DISCHARGE SUMMARY
[facility]     Ozzie Zuleta Patient Status:  Observation    3/8/1940 MRN [id number]   Location [facility] 8NE-A Attending La Gonzales, DO   Hosp Day # 0 PCP Ozzie Alfaro MD     Date of Admission: 2024  Date of Discharge:   24  Discharge Disposition: Home Health Care Services    Discharge Diagnosis:  # Recurrent fall/syncope  # Severe orthostatic hypotension  # Hx Esophageal stricture s/p balloon dilation   # CBD dilation  # lumbar fusion hardware   # Chronic pain  # Hyponatremia  # Hyperlipidemia   # GERD   # Hypothyroidism     History of Present Illness: (per Dr. Dunlap), Ozzie Zuleta is a 83 year old male with PMHx GERD, HTN, HLD, recurrent falls, chronic benzo use, hx esophageal stricture s/p balloon dilation, Hypothyroidism who presents for syncope. Patient with history of recurrent syncopal episodes, multiple admissions for same, with cardiac workup negative.  Notes that today he was at home, was walking with walker when he started feeling lightheaded and dizzy and passed out.  Hit his head on the linoleum floor, states that he regained consciousness in approximately 30 seconds to an minute per his wife.  Was back to baseline mentation after waking.  Denies associated chest pain, pressure, abdominal pain, nausea, vomiting.  Does note that he has a history of orthostatic hypotension and gets lightheaded when standing.  Notes that uses a walker and attempt to alleviate this however the longer he stands the more lightheaded he gets and notes that if he was standing for more than 1015 minutes he is likely to pass out.  Has not tried any medications for orthostasis or compression stockings.       Brief Synopsis: admitted with recurrent syncope/orthostatic hypotension. Treated supportively with compression stockings, IVF, and fludrocortisone started. Concern for possible CBD dilation on imaging and he underwent MRCP which was unremarkable. He was  evaluated by SLP and cleared to continue pureed diet. NS was consulted to evaluate lumbar fusion hardware after fall and they recommended no further w/u, intervention, or f/u. His clinical condition improved and he was discharged to home with recommendation to schedule f/u with GI to further address chronic esophageal strictures. He was advised to f/u with PCP closely in outpt setting.     Lace+ Score: 69  59-90 High Risk  29-58 Medium Risk  0-28   Low Risk  Patient was referred to [facility].    TCM Follow-Up Recommendation:  LACE > 58: High Risk of readmission after discharge from the hospital.      Procedures during hospitalization:   none    Incidental or significant findings and recommendations (brief descriptions):  As above    Lab/Test results pending at Discharge:   none    Consultants:  NS    Discharge Medication List:     Discharge Medications        START taking these medications        Instructions Prescription details   fludrocortisone 0.1 MG Tabs  Commonly known as: Florinef      Take 1 tablet (0.1 mg total) by mouth daily.   Quantity: 30 tablet  Refills: 0     levothyroxine 88 MCG Tabs  Commonly known as: Synthroid      Take 1 tablet (88 mcg total) by mouth before breakfast.   Quantity: 90 tablet  Refills: 0            CONTINUE taking these medications        Instructions Prescription details   acetaminophen 500 MG Tabs  Commonly known as: Tylenol Extra Strength      Take 1 tablet (500 mg total) by mouth every 6 (six) hours as needed for Pain.   Refills: 0     albuterol 108 (90 Base) MCG/ACT Aers  Commonly known as: Ventolin HFA      Inhale 2 puffs into the lungs every 6 (six) hours as needed for Wheezing.   Refills: 0     artificial saliva substitute Soln      Take 10 mL by mouth in the morning, at noon, and at bedtime.   Refills: 0     atorvastatin 10 MG Tabs  Commonly known as: Lipitor      Take 1 tablet (10 mg total) by mouth nightly.   Quantity: 30 tablet  Refills: 0      cholecalciferol 50 MCG (2000 UT) Caps  Commonly known as: Vitamin D3      Take 1 capsule (2,000 Units total) by mouth daily.   Refills: 0     cyanocobalamin 1000 MCG/ML Soln  Commonly known as: Vitamin B12      Inject 1 mL (1,000 mcg total) into the muscle every 30 (thirty) days.   Quantity: 3 mL  Refills: 9     docusate sodium 100 MG Caps  Commonly known as: Colace      Take 1 capsule (100 mg total) by mouth 2 (two) times daily.   Refills: 0     fentaNYL 25 MCG/HR Pt72  Commonly known as: Duragesic  Notes to patient: Current patch is on right upper back      Place 1 patch onto the skin every 3 (three) days.   Refills: 0     Insulin Syringe-Needle U-100 31G X 5/16\" 1 ML Misc  Commonly known as: BD Insulin Syringe Ultrafine      Use for B12 injection   Quantity: 10 each  Refills: 2     mirtazapine 7.5 MG Tabs  Commonly known as: Remeron  Start taking on: January 12, 2024      Take 1 tablet (7.5 mg total) by mouth nightly.   Quantity: 30 tablet  Refills: 2     Naloxone HCl 4 MG/0.1ML Liqd      4 mg by Nasal route as needed. If patient remains unresponsive, repeat dose in other nostril 2-5 minutes after first dose.   Quantity: 1 kit  Refills: 0     ondansetron 4 mg tablet  Commonly known as: Zofran      Take 1 tablet (4 mg total) by mouth every 8 (eight) hours as needed for Nausea.   Refills: 0     pantoprazole 40 MG Tbec  Commonly known as: Protonix      Take 1 tablet (40 mg total) by mouth before breakfast.   Quantity: 90 tablet  Refills: 3     psyllium 28 % Pack  Commonly known as: Metamucil      Take 1 packet by mouth 2 (two) times daily.   Quantity: 15 packet  Refills: 0     sennosides 8.6 MG Tabs  Commonly known as: Senokot      Take 2 tablets (17.2 mg total) by mouth daily as needed (constipation).   Refills: 0               Where to Get Your Medications        These medications were sent to [facility]/pharmacy #[id number] - [place], [place] - [address] [address], [phone], [phone] [address]  [address], [facility] [address]      Phone: [phone]   fludrocortisone 0.1 MG Tabs         [place]    Follow-up appointment:   Ozzie Alfaro MD   [address]  [facility] [address] [phone]    Schedule an appointment as soon as possible for a visit in 1 week(s)  Need to have 4 staples removed. Make follow up appointment.    Semaj Harley MD  [address] [facility]   [facility] [address] [phone]    Schedule an appointment as soon as possible for a visit      Appointments for Next 30 Days 2024 - 2024      None            Vital signs:  Temp:  [97.4 °F (36.3 °C)-98.2 °F (36.8 °C)] 98.2 °F (36.8 °C)  Pulse:  [62-83] 81  Resp:  [16-18] 16  BP: (114-164)/(62-90) 114/82  SpO2:  [92 %-97 %] 94 %    Physical Exam:    General: No acute distress   Lungs: clear to auscultation  Cardiovascular: S1, S2  Abdomen: Soft      -----------------------------------------------------------------------------------------------  PATIENT DISCHARGE INSTRUCTIONS: See electronic chart    La Gonzales DO    Total time spent on discharge plannin minutes     The  [employer] Cures Act makes medical notes like these available to patients in the interest of transparency. Please be advised this is a medical document. Medical documents are intended to carry relevant information, facts as evident, and the clinical opinion of the practitioner. The medical note is intended as peer to peer communication and may appear blunt or direct. It is written in medical language and may contain abbreviations or verbiage that are unfamiliar.

## 2024-01-08 NOTE — PROGRESS NOTES
01/08/24 0830 01/08/24 0832 01/08/24 0833   Vital Signs   BP (!) 164/86 135/79 132/76   MAP (mmHg) (!) 105 91 89   BP Location Left arm Left arm Left arm   BP Method Automatic Automatic Automatic   Patient Position Lying Sitting Standing     Pt reports dizziness when standing.

## 2024-01-08 NOTE — PLAN OF CARE
Patient alert and oriented x4. Up with standby assist and walker. NSR on tele. Maintaining o2 sats on RA. C/o severe pain to neck/back, prn tylenol given with some relief. Continent of bowel and bladder. IVF. NPO, speech to see today. Reviewed POC with patient, verbalized understanding. Safety precautions put in place, bed alarm on.     Problem: Patient/Family Goals  Goal: Patient/Family Long Term Goal  Description: Patient's Long Term Goal: discharge     Interventions:  - appropriate consults, taking prescribed meds  - See additional Care Plan goals for specific interventions  Outcome: Progressing  Goal: Patient/Family Short Term Goal  Description: Patient's Short Term Goal: feel better    Interventions:   - speech consult, PT/OT, MRCP   - See additional Care Plan goals for specific interventions  Outcome: Progressing

## 2024-01-08 NOTE — PLAN OF CARE
Assumed care of patient at 0700. Pt A/Ox 4. No further bleeding to head laceration, staples intact. O2 sats maintained on room air. NSR on tele. Last BM 1/6 per pt. Voiding without difficulty. Pt reports no pain. Pt up x1 assist and walker. Pt updated on plan of care. Care needs met. Bed in lowest position, Call light within reach. Bed alarm on. 1L bolus given per MD orders. Cleared by speech therapy for pureed diet.     POC: discharge       Problem: Patient/Family Goals  Goal: Patient/Family Long Term Goal  Description: Patient's Long Term Goal: discharge     Interventions:  - appropriate consults, taking prescribed meds  - See additional Care Plan goals for specific interventions  Outcome: Progressing  Goal: Patient/Family Short Term Goal  Description: Patient's Short Term Goal: feel better    Interventions:   - speech consult, PT/OT, MRCP   - See additional Care Plan goals for specific interventions  Outcome: Progressing

## 2024-01-08 NOTE — DISCHARGE INSTRUCTIONS
RECOMMENDATIONS   Diet Recommendations - Solids: Puree (Pateint prefers puree. He is safe for solids of cookies/pastries.)  Diet Recommendations - Liquids: Thin Liquids  Aspiration Precautions: Upright position  Medication Administration Recommendations: No restrictions    Resume home health care with [facility]  P: [phone]  F: [phone]

## 2024-01-08 NOTE — SLP NOTE
ADULT SWALLOWING EVALUATION    ASSESSMENT    ASSESSMENT/OVERALL IMPRESSION:  Patient with history of recurrent syncopal episodes, multiple admissions for same, with cardiac workup negative.  Notes that today he was at home, was walking with walker when he started feeling lightheaded and dizzy and passed out.  Hit his head on the linoleum floor, states that he regained consciousness in approximately 30 seconds to a minute.  Was back to baseline mentation after waking. Patient known to this service from previous swallow eval last time in 2021 with recommendation for puree diet, thin liquids at that time. Patient with PMHx including ACDF 2014, broken jaw s/p fall in 2015, and \"narrow esophagus\" per patient report. Patient reports consuming a primarily puree diet with thin liquids at home and on occasion a cookie.Seen by our department on 7/27/21 and 12/12/23 for history of modified diet with recommendation of puree diet and thin liquids.     Orders were received for a bedside swallowing evaluation as he was admitted on modified diet of puree diet and thin liquids(patient preference).   Oral motor mechanism exam revealed functional oral range, rate, and strength of oral musculature, missing several teeth in posterior oral cavity, and  clear vocal quality. Strong cough on command.    Assessed patient with thin liquids via spoon, cup, and straw, puree, and solids (cookie).    Oral phase revealed functional oral acceptance, retrieval and mastication of solids with timely and complete clearing of the oral cavity.   Pharyngeal phase revealed an apparent timely initiation of the pharyngeal phase with functional hyolaryngeal elevation on palpation. No coughing or throat clearing. Patient presents with functional oral phase and apparent functional pharyngeal phase. Patient demonstrates safe po intake of solids.   Recommend patient preferred diet of puree solids, safe of solids, and thin liquids. Recommend dietary consult for  nutritional needs.   No further skilled dysphagia therapy is warranted.          RECOMMENDATIONS   Diet Recommendations - Solids: Puree (Pateint prefers puree. He is safe for solids of cookies/pastries.)  Diet Recommendations - Liquids: Thin Liquids    Aspiration Precautions: Upright position  Medication Administration Recommendations: No restrictions  Treatment Plan/Recommendations: No further inpatient SLP service warranted (dietary consult)  Discharge Recommendations/Plan: Undetermined    HISTORY   MEDICAL HISTORY  Reason for Referral: R/O aspiration    Problem List  Principal Problem:    Syncope and collapse  Active Problems:    Dilation of biliary tract    Blunt head trauma, initial encounter    Abdominal pain, acute    Strain of neck muscle, initial encounter    Esophageal stricture    Orthostatic hypotension    Neck pain    Acute midline low back pain without sciatica      Past Medical History  Past Medical History:   Diagnosis Date    Abdominal distention     Abdominal pain     Acute encephalopathy     Anemia     Back pain     Back problem     back and neck pain    Black stools     Bladder incontinence     Bloating     Community acquired pneumonia of right middle lobe of lung     Constipation     Diarrhea, unspecified     Disorder of prostate     Dizziness     Esophageal reflux     Exposure to TB     Fatigue     Flatulence/gas pain/belching     Frequent use of laxatives     Hearing loss     Heart palpitations     Hemorrhoids     High blood pressure     High cholesterol     Hoarseness, chronic     Incontinence     Indigestion 03/01/2018    Irregular bowel habits     Leaking of urine     Loss of appetite     Other and unspecified hyperlipidemia     PAC (premature atrial contraction) 01/22/2015    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    Problems with swallowing     Sleep disturbance     Stool incontinence     Unspecified disorder of thyroid     Unspecified essential hypertension      Unspecified gastritis and gastroduodenitis without mention of hemorrhage     Unspecified hypothyroidism     Weight loss        Prior Living Situation: Home alone  Diet Prior to Admission: Puree;Thin liquids (eats cookies and pastries)       Patient/Family Goals: to eat and drink    SWALLOWING HISTORY  Current Diet Consistency: Puree;Thin liquids  Dysphagia History: Seen 12/12/23 and 7/ 27/21 with recommendation of patient's preferred diet of  puree solids and thin liquids.   Imaging Results:     SUBJECTIVE       OBJECTIVE   ORAL MOTOR EXAMINATION  Dentition: Natural (missing numerous back teeth)  Symmetry: Within Functional Limits  Strength: Within Functional Limits  Tone: Within Functional Limits  Range of Motion: Within Functional Limits  Rate of Motion: Within Functional Limits    Voice Quality: Clear  Respiratory Status: Unlabored  Consistencies Trialed: Thin liquids;Puree;Hard solid  Method of Presentation: Self presentation;Spoon;Cup;Straw;Consecutive swallows  Patient Positioning: Upright;Midline (in bed)    Oral Phase of Swallow: Within Functional Limits                      Pharyngeal Phase of Swallow: Within Functional Limits           (Please note: Silent aspiration cannot be evaluated clinically. Videofluoroscopic Swallow Study is required to rule-out silent aspiration.)    Esophageal Phase of Swallow: No complaints consistent with possible esophageal involvement  Comments:         FOLLOW UP  Treatment Plan/Recommendations: No further inpatient SLP service warranted (dietray consult)     Follow Up Needed (Documentation Required): No  SLP Follow-up Date: 01/08/24    Thank you for your referral.   If you have any questions, please contact CAMMY Tafoya

## 2024-01-08 NOTE — CM/SW NOTE
01/08/24 1200   Discharge disposition   Expected discharge disposition Home or Self   Post Acute Care Provider [facility]   Discharge transportation [facility]       Pt cleared to discharge to return home today. [facility] set up for 3:00pm . PCS form completed. RN updated. Requested RN to inform pt of the cost associated w/ transport. [facility]  at discharge.     [facility]: [phone]    [facility]  P: [phone]  F: [phone]

## 2024-01-08 NOTE — CM/SW NOTE
FANI met with pt at bedside to confirm discharge plan of return home. Confirmed that pt is current w/ [facility]. Pt voiced that he will need transport home. Spoke with Tahmina at [facility] to make aware of admit. Updates sent in Aidin.    [facility]  P: [phone]  F: [phone]    Aditi Mcintosh, MSW, LSW  Discharge Planner  [id number]

## 2024-01-08 NOTE — OCCUPATIONAL THERAPY NOTE
OCCUPATIONAL THERAPY EVALUATION - INPATIENT    Room Number: 8626/8626-A  Evaluation Date: 1/8/2024     Type of Evaluation: Initial  Presenting Problem: syncope and collapse    Physician Order: IP Consult to Occupational Therapy  Reason for Therapy:  ADL/IADL Dysfunction and Discharge Planning    History: Patient is a 83 year old male admitted on 1/6/2024 with Presenting Problem: syncope and collapse.  Co-Morbidities : GERD, hypothyroidism, HLD, recurrent falls, chronic benzo use, esophageal stricture, s/p balloon dilation    ASSESSMENT   Patient met all OT goals at or close to baseline level. Pt presents with some decreased in BP with position changes from supine to sit to stand. Pt reported some mild dizziness with sitting and standing with BP stabilizing around 126/78 with increased time standing. Pt aware of compensatory strategies such as resting for periods of time between position changes to give body time to adjust. Patient reports no further questions/concerns at this time.     The AM-PAC ' '6-Clicks' Inpatient Daily Activity Short Form was completed and this patient is demonstrating an Approx Degree of Impairment: 0% in activities of daily living. Research supports that patients with this level of impairment often benefit from discharge home with home health for follow-up on reported difficulty bathing at home and general safety within specific home environment.      OT Discharge Recommendations: Intermittent Supervision;Home with home health PT/OT  OT Device Recommendations: None    WEIGHT BEARING RESTRICTION  Weight Bearing Restriction: None                Recommendations for nursing staff:   Transfers: one person with RW  Toileting location: Toilet    EVALUATION SESSION:  Patient at start of session: semi-supine in bed  FUNCTIONAL TRANSFER ASSESSMENT  Sit to Stand: Edge of Bed  Edge of Bed: Supervision    BED MOBILITY  Supine to Sit : Independent  Sit to Supine (OT): Independent  Scooting:  supervision    BALANCE ASSESSMENT  Static Sitting: Independent  Sitting Bilateral: Independent  Static Standing: Supervision  Standing Bilateral: Supervision    FUNCTIONAL ADL ASSESSMENT  UB Dressing Seated: Supervision  LB Dressing Seated: Supervision      ACTIVITY TOLERANCE: WFL           BP: 114/82  BP Location: Left arm  BP Method: Automatic  Patient Position: Standing    O2 SATURATIONS  Oxygen Therapy  SPO2% on Room Air at Rest: 94    COGNITION  Overall Cognitive Status:  WFL - within functional limits  COGNITION ASSESSMENTS       Upper Extremity:   ROM: within functional limits   Strength: is within functional limits   Coordination:  Gross motor: WFL  Fine motor: WFL  Sensation: Light touch:  intact    EDUCATION PROVIDED  Patient : Role of Occupational Therapy; Plan of Care; Discharge Recommendations; Functional Transfer Techniques; Fall Prevention; Posture/Positioning  Patient's Response to Education: Verbalized Understanding; Returned Demonstration    Equipment used: RW  Demonstrates functional use    Therapist comments: Pt is pleasant and cooperative throughout session.     Patient End of Session: In bed;Needs met;Call light within reach;RN aware of session/findings;All patient questions and concerns addressed;Alarm set    OCCUPATIONAL PROFILE    HOME SITUATION  Type of Home: House  Home Layout: One level  Lives With: Spouse       Shower/Tub and Equipment: Walk-in shower;Tub-shower combo;Grab bar;Shower chair       Occupation/Status: watches TV     Drives: No  Patient Regularly Uses: Reading glasses    Prior Level of Function: Pt is MOD I with BADLs, IADLs, however reported some difficulty with bathing at home.     SUBJECTIVE  \"I'm going home today.\"    PAIN ASSESSMENT  Rating: Unable to rate  Location: back pain when seated EOB       OBJECTIVE  Precautions: Bed/chair alarm;Other (Comment) (monitor orthostatics)  Fall Risk: High fall risk    WEIGHT BEARING RESTRICTION  Weight Bearing Restriction: None                 AM-PAC ‘6-Clicks’ Inpatient Daily Activity Short Form  -   Putting on and taking off regular lower body clothing?: None  -   Bathing (including washing, rinsing, drying)?: None  -   Toileting, which includes using toilet, bedpan or urinal? : None  -   Putting on and taking off regular upper body clothing?: None  -   Taking care of personal grooming such as brushing teeth?: None  -   Eating meals?: None    AM-PAC Score:  Score: 24  Approx Degree of Impairment: 0%  Standardized Score (AM-PAC Scale): 57.54      ADDITIONAL TESTS     NEUROLOGICAL FINDINGS        PLAN   Patient has been evaluated and presents with no skilled Occupational Therapy needs at this time.  Patient discharged from Occupational Therapy services.  Please re-order if a new functional limitation presents during this admission.      Patient Evaluation Complexity Level:   Occupational Profile/Medical History LOW - Brief history including review of medical or therapy records    Specific performance deficits impacting engagement in ADL/IADL LOW  1 - 3 performance deficits    Client Assessment/Performance Deficits MODERATE - Comorbidities and min to mod modifications of tasks    Clinical Decision Making LOW - Analysis of occupational profile, problem-focused assessments, limited treatment options    Overall Complexity LOW     OT Session Time: 15 minutes  Self-Care Home Management: 4 minutes  Therapeutic Activity: 7 minutes

## 2024-01-08 NOTE — PLAN OF CARE
NURSING DISCHARGE NOTE    Discharged Home via [facility].  Accompanied by Support staff  Belongings Taken by patient/family.    Tele and IV discontinued without any complications. Discharge education and instructions provided. All questions answered.

## 2024-01-09 ENCOUNTER — TELEPHONE (OUTPATIENT)
Dept: FAMILY MEDICINE CLINIC | Facility: CLINIC | Age: 84
End: 2024-01-09

## 2024-01-09 ENCOUNTER — PATIENT OUTREACH (OUTPATIENT)
Dept: CASE MANAGEMENT | Age: 84
End: 2024-01-09

## 2024-01-09 DIAGNOSIS — R55 SYNCOPE AND COLLAPSE: Primary | ICD-10-CM

## 2024-01-09 DIAGNOSIS — Z02.9 ENCOUNTERS FOR ADMINISTRATIVE PURPOSE: ICD-10-CM

## 2024-01-09 PROCEDURE — 1111F DSCHRG MED/CURRENT MED MERGE: CPT

## 2024-01-09 NOTE — TELEPHONE ENCOUNTER
LM to return call- can schedule with any sooner provider, or currently [facility] has 2:30 on 1/19/24 open

## 2024-01-09 NOTE — TELEPHONE ENCOUNTER
Spoke to patient for TCM today.  Pt requesting an appt but none are available by the recommended timeframe.  TCM appointment recommended by 1/15/24 as patient is a High risk for readmission.  Please assist pt in scheduling appt. Thank you.    BOOK BY DATE (last date for TCM): 1/22/24

## 2024-01-09 NOTE — PROGRESS NOTES
Initial Post Discharge Follow Up   Discharge Date: 1/8/24  Contact Date: 1/9/2024    Consent Verification:  Assessment Completed With: Patient  HIPAA Verified?  Yes    Discharge Dx:   Syncope and Collapse    General:   How have you been since your discharge from the hospital? I'm doing okay.  Do you have any pain since discharge?  Yes  Where: all over body and neck from fall.    Rating on pain scale 1-10, 10 being the worst pain you have ever experienced, what is current pain: 9  Alleviating factors: none  Aggravating factors: none  Is the pain manageable at home? Yes  How well was your pain managed while in the hospital?   On a scale of 1-5   1- Very Poor and 5- Very well   Very Well  When you were leaving the hospital were your discharge instructions reviewed with you? Yes  How well were your discharge instructions explained to you?   On a scale of 1-5   1- Very Poor and 5- Very well   Very Well  Do you have any questions about your discharge instructions?  No  Before leaving the hospital was your diagnoses explained to you? Yes  Do you have any questions about your diagnoses? No  Are you able to perform normal daily activities of living as you have prior to your hospital stay (dressing, bathing, ambulating to the bathroom, etc)? yes  (NCM) Was patient given a different diet per AVS? no      Medications:   Current Outpatient Medications   Medication Sig Dispense Refill    fludrocortisone 0.1 MG Oral Tab Take 1 tablet (0.1 mg total) by mouth daily. 30 tablet 0    levothyroxine 88 MCG Oral Tab Take 1 tablet (88 mcg total) by mouth before breakfast. 90 tablet 0    [START ON 1/12/2024] mirtazapine 7.5 MG Oral Tab Take 1 tablet (7.5 mg total) by mouth nightly. 30 tablet 2    pantoprazole 40 MG Oral Tab EC Take 1 tablet (40 mg total) by mouth before breakfast. 90 tablet 3    atorvastatin 10 MG Oral Tab Take 1 tablet (10 mg total) by mouth nightly. 30 tablet 0    Insulin Syringe-Needle U-100 (BD INSULIN SYRINGE  ULTRAFINE) 31G X 5/16\" 1 ML Does not apply Misc Use for B12 injection 10 each 2    docusate sodium 100 MG Oral Cap Take 1 capsule (100 mg total) by mouth 2 (two) times daily.      sennosides 8.6 MG Oral Tab Take 2 tablets (17.2 mg total) by mouth daily as needed (constipation).      artificial saliva substitute Mouth/Throat Solution Take 10 mL by mouth in the morning, at noon, and at bedtime.      ondansetron (ZOFRAN) 4 mg tablet Take 1 tablet (4 mg total) by mouth every 8 (eight) hours as needed for Nausea.      albuterol 108 (90 Base) MCG/ACT Inhalation Aero Soln Inhale 2 puffs into the lungs every 6 (six) hours as needed for Wheezing.      fentaNYL 25 MCG/HR Transdermal Patch 72 Hr Place 1 patch onto the skin every 3 (three) days.      cyanocobalamin 1000 MCG/ML Injection Solution Inject 1 mL (1,000 mcg total) into the muscle every 30 (thirty) days. 3 mL 9    Naloxone HCl 4 MG/0.1ML Nasal Liquid 4 mg by Nasal route as needed. If patient remains unresponsive, repeat dose in other nostril 2-5 minutes after first dose. 1 kit 0    acetaminophen 500 MG Oral Tab Take 1 tablet (500 mg total) by mouth every 6 (six) hours as needed for Pain.      psyllium 28 % Oral Powd Pack Take 1 packet by mouth 2 (two) times daily. 15 packet 0    Vitamin D3 (VITAMIN D3) 2000 UNITS Oral Cap Take 1 capsule (2,000 Units total) by mouth daily.       Were there any changes to your current medication(s) noted on the AVS? Yes  If so, were these medication changes discussed with you prior to leaving the hospital? Yes  If a new medication was prescribed:    Was the new medication's purpose & side effects reviewed? Yes  Do you have any questions about your new medication? No  Did you  your discharge medications when you left the hospital? Yes  Let's go over your medications together to make sure we are not missing anything. Medications Reviewed  Are there any reasons that keep you from taking your medication as prescribed? No  Are you  having any concerns with constipation? No      Discharge medications reviewed/discussed/and reconciled against outpatient medications with patient.  Any changes or updates to medications sent to PCP.  Patient Acknowledged     Referrals/orders at D/C:  Referrals/orders placed at D/C? yes  What services:   Home health   (If HH was ordered) Has HH been set up?  No, pt states that he has [facility] information.      DME ordered at D/C? No  Discharge orders, AVS reviewed and discussed with patient. Any changes or updates to orders sent to PCP.  Patient Acknowledged      SDOH:   Transportation Needs: No Transportation Needs (1/6/2024)    Transportation Needs     Lack of Transportation: No     Financial Resource Strain: Low Risk  (5/24/2023)    Financial Resource Strain     Difficulty of Paying Living Expenses: Not very hard     Med Affordability: No         Follow up appointments:          TCC  Was TCC ordered: Yes  Was TCC scheduled: No, Explain-Pt wants to see PCP      PCP (If no TCC appointment)  Does patient already have a PCP appointment scheduled? No  NCM Attempted to schedule PCP office TCM appointment with patient   If no appointment scheduled: Explain-no available appts, NCM sent TE to PCP office and pt aware that he will receive a return call.     Specialist    Does the patient have any other follow up appointment(s) needing to be scheduled? Yes  If yes: NCM reviewed upcoming specialist appointment with patient: Yes  Does the patient need assistance scheduling appointment(s): No, pt states that he will schedule with GI    Is there any reason as to why you cannot make your appointment(s)?  No     Needs post D/C:   Now that you are home, are there any needs or concerns you need addressed before your next visit with your PCP?  (DME, meds, questions, etc.): No    Interventions by NCM:   NCM reviewed discharge instructions and when to seek medical attention with the patient. He states that he is doing okay. He does  have pain all over from the fall but not any worse. He states that he still gets dizzy when standing too long but that is not new. NCM instructed on s/s of infection; he v/u and states that his scalp laceration is looking fine and he will be getting the staples removed next Monday. He does not check his bp. He denied having any fever, n/v/c/d, sob, HA or any new or worsening symptoms. Med review completed. He denied having any questions or concerns at this time.       CCM referral placed:    Yes    BOOK BY DATE: 1/22/24

## 2024-01-18 ENCOUNTER — PATIENT MESSAGE (OUTPATIENT)
Dept: FAMILY MEDICINE CLINIC | Facility: CLINIC | Age: 84
End: 2024-01-18

## 2024-01-18 RX ORDER — FLUDROCORTISONE ACETATE 0.1 MG/1
0.1 TABLET ORAL DAILY
Qty: 30 TABLET | Refills: 5 | Status: SHIPPED | OUTPATIENT
Start: 2024-01-18

## 2024-01-18 NOTE — TELEPHONE ENCOUNTER
From: Ozzie Zuleta  To: Ozzie Alfaro  Sent: 1/18/2024 11:32 AM CST  Subject: Fludrocortisone    Dr. Alfaro,  The above medication was prescribed for me when I was in the hospital and it will be up for refill soon.   Can you prescribe this medication for me?  Ozzie Zuleta

## 2024-01-31 ENCOUNTER — PATIENT OUTREACH (OUTPATIENT)
Dept: CASE MANAGEMENT | Age: 84
End: 2024-01-31

## 2024-02-07 DIAGNOSIS — E78.00 PURE HYPERCHOLESTEROLEMIA: ICD-10-CM

## 2024-02-07 RX ORDER — ATORVASTATIN CALCIUM 10 MG/1
10 TABLET, FILM COATED ORAL NIGHTLY
Qty: 30 TABLET | Refills: 0 | Status: SHIPPED | OUTPATIENT
Start: 2024-02-07

## 2024-02-07 NOTE — TELEPHONE ENCOUNTER
A refill request was received for:  Requested Prescriptions     Pending Prescriptions Disp Refills    ATORVASTATIN 10 MG Oral Tab [Pharmacy Med Name: ATORVASTATIN 10 MG TABLET] 30 tablet 0     Sig: TAKE 1 TABLET BY MOUTH EVERY DAY AT NIGHT       Last refill date:11/24/2023     No lipid panel in last 12 months  Last office visit:12/21/2023     Follow up due:  Future Appointments   Date Time Provider Department Center   2/8/2024  2:30 PM Ozzie Alfaro MD [facility] [address]

## 2024-02-13 NOTE — PROGRESS NOTES
Goal Outcome Evaluation:   Took over care at 12:30.  Vapotherm oxygen as documented.  Vital signs stable.               Patient identified with a potential need for Chronic Care Management services.  Called patient to introduce self and availability of Chronic Care Management services.  Patient informed about the following service elements:  Health information sharing - complying with all laws related to privacy and security of their health information  Patient/Insurance Cost sharing - includes deductible and any ongoing copays and/or coinsurance  Only one provider can bill for this service monthly  Patient right to discontinue services at any time for any reason effective last day of current month  Patient verbally declines to participate in Chronic Care Management services and any associated charges. Patient declined further information to be sent.

## 2024-02-29 DIAGNOSIS — E78.00 PURE HYPERCHOLESTEROLEMIA: ICD-10-CM

## 2024-02-29 RX ORDER — ATORVASTATIN CALCIUM 10 MG/1
10 TABLET, FILM COATED ORAL NIGHTLY
Qty: 90 TABLET | Refills: 0 | Status: SHIPPED | OUTPATIENT
Start: 2024-02-29

## 2024-03-07 DIAGNOSIS — F06.8 ANXIETY DISORDER DUE TO MULTIPLE MEDICAL PROBLEMS: ICD-10-CM

## 2024-03-07 DIAGNOSIS — F51.05 INSOMNIA DUE TO OTHER MENTAL DISORDER: ICD-10-CM

## 2024-03-07 DIAGNOSIS — F32.A DEPRESSIVE DISORDER: ICD-10-CM

## 2024-03-07 DIAGNOSIS — F99 INSOMNIA DUE TO OTHER MENTAL DISORDER: ICD-10-CM

## 2024-03-07 RX ORDER — MIRTAZAPINE 15 MG/1
15 TABLET, FILM COATED ORAL NIGHTLY
Qty: 30 TABLET | Refills: 0 | Status: SHIPPED | OUTPATIENT
Start: 2024-03-07

## 2024-03-07 NOTE — TELEPHONE ENCOUNTER
A refill request was received for:  Requested Prescriptions     Pending Prescriptions Disp Refills    mirtazapine 15 MG Oral Tab 30 tablet 0     Sig: Take 1 tablet (15 mg total) by mouth nightly.       Last refill date:2/8/2024      Last office visit: 2/8/2024    Follow up due:  No future appointments.

## 2024-04-01 RX ORDER — LEVOTHYROXINE SODIUM 88 UG/1
88 TABLET ORAL
Qty: 90 TABLET | Refills: 0 | Status: SHIPPED | OUTPATIENT
Start: 2024-04-01

## 2024-04-01 NOTE — TELEPHONE ENCOUNTER
A refill request was received for:  Requested Prescriptions     Pending Prescriptions Disp Refills    LEVOTHYROXINE 88 MCG Oral Tab [Pharmacy Med Name: LEVOTHYROXINE 88 MCG TABLET] 90 tablet 0     Sig: TAKE 1 TABLET BY MOUTH BEFORE BREAKFAST.   12/2023  TSH .020  T4 1.44    Last refill date:1/3/2024       Last office visit: 2/8/2024    Follow up due:  No future appointments.

## 2024-04-02 DIAGNOSIS — F99 INSOMNIA DUE TO OTHER MENTAL DISORDER: ICD-10-CM

## 2024-04-02 DIAGNOSIS — F32.A DEPRESSIVE DISORDER: ICD-10-CM

## 2024-04-02 DIAGNOSIS — F06.8 ANXIETY DISORDER DUE TO MULTIPLE MEDICAL PROBLEMS: ICD-10-CM

## 2024-04-02 DIAGNOSIS — F51.05 INSOMNIA DUE TO OTHER MENTAL DISORDER: ICD-10-CM

## 2024-04-02 RX ORDER — MIRTAZAPINE 15 MG/1
15 TABLET, FILM COATED ORAL NIGHTLY
Qty: 30 TABLET | Refills: 0 | Status: SHIPPED | OUTPATIENT
Start: 2024-04-02

## 2024-04-02 NOTE — TELEPHONE ENCOUNTER
A refill request was received for:  Requested Prescriptions     Pending Prescriptions Disp Refills    MIRTAZAPINE 15 MG Oral Tab [Pharmacy Med Name: MIRTAZAPINE 15 MG TABLET] 30 tablet 0     Sig: TAKE 1 TABLET BY MOUTH EVERY DAY AT NIGHT       Last refill date:3/7/2024       Last office visit:2/8/2024     Follow up due:  No future appointments.

## 2024-04-12 DIAGNOSIS — D51.0 PERNICIOUS ANEMIA: ICD-10-CM

## 2024-04-12 RX ORDER — CYANOCOBALAMIN 1000 UG/ML
1000 INJECTION, SOLUTION INTRAMUSCULAR; SUBCUTANEOUS
Qty: 3 ML | Refills: 9 | Status: SHIPPED | OUTPATIENT
Start: 2024-04-12

## 2024-04-12 NOTE — TELEPHONE ENCOUNTER
A refill request was received for:  Requested Prescriptions     Pending Prescriptions Disp Refills    CYANOCOBALAMIN 1000 MCG/ML Injection Solution [Pharmacy Med Name: CYANOCOBALAMIN 1,000 MCG/ML VL] 3 mL 9     Sig: INJECT 1 ML (1,000 MCG TOTAL) INTO THE MUSCLE EVERY 30 DAYS.     Component  Ref Range & Units 12/13/23  6:46 AM   Vitamin B12  193 - 986 pg/mL 1,085 High        Last refill date: 12/2022      Last office visit:12/21/2023     Follow up due:  No future appointments.

## 2024-04-24 ENCOUNTER — DOCUMENTATION ONLY (OUTPATIENT)
Dept: FAMILY MEDICINE CLINIC | Facility: CLINIC | Age: 84
End: 2024-04-24

## 2024-04-24 DIAGNOSIS — F32.A DEPRESSIVE DISORDER: Primary | ICD-10-CM

## 2024-04-24 RX ORDER — MIRTAZAPINE 15 MG/1
15 TABLET, FILM COATED ORAL NIGHTLY
Qty: 90 TABLET | Refills: 0 | Status: SHIPPED | OUTPATIENT
Start: 2024-04-24

## 2024-05-08 RX ORDER — LEVOTHYROXINE SODIUM 88 UG/1
88 TABLET ORAL
Qty: 90 TABLET | Refills: 0 | Status: SHIPPED | OUTPATIENT
Start: 2024-05-08

## 2024-05-08 NOTE — TELEPHONE ENCOUNTER
A refill request was received for:  Requested Prescriptions     Pending Prescriptions Disp Refills    LEVOTHYROXINE 88 MCG Oral Tab [Pharmacy Med Name: LEVOTHYROXINE 88 MCG TABLET] 90 tablet 0     Sig: TAKE 1 TABLET BY MOUTH BEFORE BREAKFAST.     Tsh 0.020 12/29/2023  Last refill date:4/1/2024       Last office visit:2/8/2024    Follow up due:  No future appointments.

## 2024-05-31 DIAGNOSIS — F32.A DEPRESSIVE DISORDER: ICD-10-CM

## 2024-05-31 RX ORDER — MIRTAZAPINE 7.5 MG/1
7.5 TABLET, FILM COATED ORAL NIGHTLY
Qty: 90 TABLET | Refills: 0 | OUTPATIENT
Start: 2024-05-31

## 2024-05-31 NOTE — TELEPHONE ENCOUNTER
A refill request was received for:  Requested Prescriptions     Pending Prescriptions Disp Refills    MIRTAZAPINE 7.5 MG Oral Tab [Pharmacy Med Name: MIRTAZAPINE 7.5 MG TABLET] 90 tablet 0     Sig: TAKE 1 TABLET BY MOUTH EVERY DAY NIGHTLY       Last refill date:   4/24/2024    Last office visit:  8/10/2021 ((For anxiety)    Follow up due:  No future appointments.

## 2024-06-03 RX ORDER — MIRTAZAPINE 15 MG/1
15 TABLET, FILM COATED ORAL NIGHTLY
Qty: 90 TABLET | Refills: 0 | Status: SHIPPED | OUTPATIENT
Start: 2024-06-03

## 2024-06-03 NOTE — TELEPHONE ENCOUNTER
As of last message 3/2024, patient sts he has been taking mirtazapine. Sts it does help him sleep. He was increased to 15 mg in 2/2024

## 2024-07-02 DIAGNOSIS — E78.00 PURE HYPERCHOLESTEROLEMIA: ICD-10-CM

## 2024-07-02 RX ORDER — ATORVASTATIN CALCIUM 10 MG/1
10 TABLET, FILM COATED ORAL NIGHTLY
Qty: 90 TABLET | Refills: 0 | Status: SHIPPED | OUTPATIENT
Start: 2024-07-02

## 2024-07-02 NOTE — TELEPHONE ENCOUNTER
A refill request was received for:  Requested Prescriptions     Pending Prescriptions Disp Refills    ATORVASTATIN 10 MG Oral Tab [Pharmacy Med Name: ATORVASTATIN 10 MG TABLET] 90 tablet 0     Sig: TAKE 1 TABLET BY MOUTH EVERY DAY AT NIGHT       Last refill date:2/29/2024       Last office visit: 2/28/2024    Follow up due:  No future appointments.

## 2024-07-18 RX ORDER — FLUDROCORTISONE ACETATE 0.1 MG/1
0.1 TABLET ORAL DAILY
Qty: 30 TABLET | Refills: 5 | Status: SHIPPED | OUTPATIENT
Start: 2024-07-18

## 2024-07-18 NOTE — TELEPHONE ENCOUNTER
A refill request was received for:  Requested Prescriptions     Pending Prescriptions Disp Refills    FLUDROCORTISONE 0.1 MG Oral Tab [Pharmacy Med Name: FLUDROCORTISONE 0.1 MG TABLET] 30 tablet 5     Sig: TAKE 1 TABLET BY MOUTH EVERY DAY       Last refill date:1/18/2024       Last office visit:2/8/2024     Follow up due:  No future appointments.

## 2024-07-23 DIAGNOSIS — F32.A DEPRESSIVE DISORDER: ICD-10-CM

## 2024-07-23 RX ORDER — MIRTAZAPINE 7.5 MG/1
7.5 TABLET, FILM COATED ORAL NIGHTLY
Qty: 90 TABLET | Refills: 0 | OUTPATIENT
Start: 2024-07-23

## 2024-09-10 ENCOUNTER — OFFICE VISIT (OUTPATIENT)
Dept: FAMILY MEDICINE CLINIC | Facility: CLINIC | Age: 84
End: 2024-09-10
Payer: MEDICARE

## 2024-09-10 VITALS
HEIGHT: 70 IN | BODY MASS INDEX: 16.75 KG/M2 | WEIGHT: 117 LBS | OXYGEN SATURATION: 98 % | SYSTOLIC BLOOD PRESSURE: 160 MMHG | DIASTOLIC BLOOD PRESSURE: 98 MMHG | HEART RATE: 91 BPM | RESPIRATION RATE: 18 BRPM

## 2024-09-10 DIAGNOSIS — I10 PRIMARY HYPERTENSION: Primary | ICD-10-CM

## 2024-09-10 DIAGNOSIS — M54.12 CERVICAL RADICULOPATHY: ICD-10-CM

## 2024-09-10 DIAGNOSIS — M79.2 NERVE PAIN: ICD-10-CM

## 2024-09-10 DIAGNOSIS — M54.16 LUMBAR RADICULOPATHY: ICD-10-CM

## 2024-09-10 PROCEDURE — 99214 OFFICE O/P EST MOD 30 MIN: CPT | Performed by: FAMILY MEDICINE

## 2024-09-10 RX ORDER — PREGABALIN 25 MG/1
25 CAPSULE ORAL 2 TIMES DAILY
Qty: 180 CAPSULE | Refills: 1 | Status: SHIPPED | OUTPATIENT
Start: 2024-09-10

## 2024-09-10 RX ORDER — METOPROLOL SUCCINATE 25 MG/1
TABLET, EXTENDED RELEASE ORAL
Qty: 60 TABLET | Refills: 1 | Status: SHIPPED | OUTPATIENT
Start: 2024-09-10

## 2024-09-10 RX ORDER — HYDROCODONE BITARTRATE AND ACETAMINOPHEN 10; 325 MG/1; MG/1
1 TABLET ORAL EVERY 6 HOURS PRN
Qty: 120 TABLET | Refills: 0 | Status: SHIPPED | OUTPATIENT
Start: 2024-09-10

## 2024-09-10 NOTE — PROGRESS NOTES
Subjective:   Patient ID: Ozzie Zuleta is a 84 year old male.    HTN.  Chronic.  No CP/SOB.  No symptoms.  No side effects    Lumbar radiculopathy and Cervical radiculopathy.  B/L hand numbness and tingling.        History/Other:   Review of Systems   All other systems reviewed and are negative.    Current Outpatient Medications   Medication Sig Dispense Refill    metoprolol succinate ER 25 MG Oral Tablet 24 Hr 1 tab daily x 1 week, then 2 tab daily 60 tablet 1    pregabalin (LYRICA) 25 MG Oral Cap Take 1 capsule (25 mg total) by mouth 2 (two) times daily. 180 capsule 1    HYDROcodone-acetaminophen (NORCO)  MG Oral Tab Take 1 tablet by mouth every 6 (six) hours as needed for Pain. 120 tablet 0    albuterol 108 (90 Base) MCG/ACT Inhalation Aero Soln Inhale 2 puffs into the lungs every 6 (six) hours as needed for Wheezing.      acetaminophen 500 MG Oral Tab Take 1 tablet (500 mg total) by mouth every 6 (six) hours as needed for Pain.      FLUDROCORTISONE 0.1 MG Oral Tab TAKE 1 TABLET BY MOUTH EVERY DAY 30 tablet 5    ATORVASTATIN 10 MG Oral Tab TAKE 1 TABLET BY MOUTH EVERY DAY AT NIGHT 90 tablet 0    mirtazapine 15 MG Oral Tab Take 1 tablet (15 mg total) by mouth nightly. 90 tablet 0    LEVOTHYROXINE 88 MCG Oral Tab TAKE 1 TABLET BY MOUTH BEFORE BREAKFAST. 90 tablet 0    cyanocobalamin 1000 MCG/ML Injection Solution Inject 1 mL (1,000 mcg total) into the muscle every 30 (thirty) days. 3 mL 9    pantoprazole 40 MG Oral Tab EC Take 1 tablet (40 mg total) by mouth before breakfast. 90 tablet 3    Insulin Syringe-Needle U-100 (BD INSULIN SYRINGE ULTRAFINE) 31G X 5/16\" 1 ML Does not apply Misc Use for B12 injection 10 each 2    docusate sodium 100 MG Oral Cap Take 1 capsule (100 mg total) by mouth 2 (two) times daily.      sennosides 8.6 MG Oral Tab Take 2 tablets (17.2 mg total) by mouth daily as needed (constipation).      artificial saliva substitute Mouth/Throat Solution Take 10 mL by mouth in the morning,  at noon, and at bedtime.      ondansetron (ZOFRAN) 4 mg tablet Take 1 tablet (4 mg total) by mouth every 8 (eight) hours as needed for Nausea.      fentaNYL 25 MCG/HR Transdermal Patch 72 Hr Place 1 patch onto the skin every 3 (three) days.      Naloxone HCl 4 MG/0.1ML Nasal Liquid 4 mg by Nasal route as needed. If patient remains unresponsive, repeat dose in other nostril 2-5 minutes after first dose. 1 kit 0    psyllium 28 % Oral Powd Pack Take 1 packet by mouth 2 (two) times daily. 15 packet 0    Vitamin D3 (VITAMIN D3) 2000 UNITS Oral Cap Take 1 capsule (2,000 Units total) by mouth daily.       Allergies:  Allergies   Allergen Reactions    Neomycin SWELLING and PAIN     Polymyxin-bacitracin OINT, local swelling at site, ear swelling    Neomycin PAIN and SWELLING     Polymyxin-bacitracin OINT, local swelling at site, ear swelling    Bactrim [Sulfamethoxazole W/Trimethoprim]      Dry mouth and stomach cramping    Sulfa Antibiotics     Versed      Dizzy     Flonase [Fluticasone Propionate] FATIGUE     SUSP       Objective:   Physical Exam  Vitals reviewed.   Constitutional:       General: He is not in acute distress.     Appearance: He is well-developed. He is not diaphoretic.   Eyes:      General: No scleral icterus.        Right eye: No discharge.         Left eye: No discharge.      Conjunctiva/sclera: Conjunctivae normal.   Cardiovascular:      Rate and Rhythm: Normal rate and regular rhythm.      Heart sounds: Normal heart sounds.   Pulmonary:      Effort: Pulmonary effort is normal. No respiratory distress.      Breath sounds: Normal breath sounds. No wheezing or rales.         Assessment & Plan:   1. Primary hypertension    2. Lumbar radiculopathy    3. Nerve pain    4. Cervical radiculopathy      1. Primary hypertension  - metoprolol succinate ER 25 MG Oral Tablet 24 Hr; 1 tab daily x 1 week, then 2 tab daily  Dispense: 60 tablet; Refill: 1    2. Lumbar radiculopathy  - MRI SPINE LUMBAR (CPT=72148);  Future  - Pain Management Referral - In Network  - pregabalin (LYRICA) 25 MG Oral Cap; Take 1 capsule (25 mg total) by mouth 2 (two) times daily.  Dispense: 180 capsule; Refill: 1  - HYDROcodone-acetaminophen (NORCO)  MG Oral Tab; Take 1 tablet by mouth every 6 (six) hours as needed for Pain.  Dispense: 120 tablet; Refill: 0    3. Nerve pain  - pregabalin (LYRICA) 25 MG Oral Cap; Take 1 capsule (25 mg total) by mouth 2 (two) times daily.  Dispense: 180 capsule; Refill: 1  - HYDROcodone-acetaminophen (NORCO)  MG Oral Tab; Take 1 tablet by mouth every 6 (six) hours as needed for Pain.  Dispense: 120 tablet; Refill: 0    4. Cervical radiculopathy  - pregabalin (LYRICA) 25 MG Oral Cap; Take 1 capsule (25 mg total) by mouth 2 (two) times daily.  Dispense: 180 capsule; Refill: 1  - HYDROcodone-acetaminophen (NORCO)  MG Oral Tab; Take 1 tablet by mouth every 6 (six) hours as needed for Pain.  Dispense: 120 tablet; Refill: 0      Meds This Visit:  Requested Prescriptions     Signed Prescriptions Disp Refills    metoprolol succinate ER 25 MG Oral Tablet 24 Hr 60 tablet 1     Si tab daily x 1 week, then 2 tab daily    pregabalin (LYRICA) 25 MG Oral Cap 180 capsule 1     Sig: Take 1 capsule (25 mg total) by mouth 2 (two) times daily.    HYDROcodone-acetaminophen (NORCO)  MG Oral Tab 120 tablet 0     Sig: Take 1 tablet by mouth every 6 (six) hours as needed for Pain.       Imaging & Referrals:  OP REFERRAL PAIN MANGEMENT  MRI SPINE LUMBAR (CPT=72148)

## 2024-10-01 ENCOUNTER — OFFICE VISIT (OUTPATIENT)
Dept: FAMILY MEDICINE CLINIC | Facility: CLINIC | Age: 84
End: 2024-10-01
Payer: MEDICARE

## 2024-10-01 ENCOUNTER — TELEPHONE (OUTPATIENT)
Dept: FAMILY MEDICINE CLINIC | Facility: CLINIC | Age: 84
End: 2024-10-01

## 2024-10-01 ENCOUNTER — LAB ENCOUNTER (OUTPATIENT)
Dept: LAB | Age: 84
End: 2024-10-01
Attending: FAMILY MEDICINE
Payer: MEDICARE

## 2024-10-01 VITALS
BODY MASS INDEX: 19.78 KG/M2 | DIASTOLIC BLOOD PRESSURE: 86 MMHG | RESPIRATION RATE: 18 BRPM | OXYGEN SATURATION: 97 % | HEIGHT: 70 IN | HEART RATE: 60 BPM | WEIGHT: 138.19 LBS | SYSTOLIC BLOOD PRESSURE: 170 MMHG

## 2024-10-01 DIAGNOSIS — Z12.5 ENCOUNTER FOR SCREENING FOR MALIGNANT NEOPLASM OF PROSTATE: ICD-10-CM

## 2024-10-01 DIAGNOSIS — Z11.1 SCREENING-PULMONARY TB: ICD-10-CM

## 2024-10-01 DIAGNOSIS — E55.9 VITAMIN D DEFICIENCY: ICD-10-CM

## 2024-10-01 DIAGNOSIS — Z00.00 ENCOUNTER FOR ANNUAL HEALTH EXAMINATION: Primary | ICD-10-CM

## 2024-10-01 DIAGNOSIS — Z23 NEED FOR VACCINATION FOR PNEUMOCOCCUS: ICD-10-CM

## 2024-10-01 DIAGNOSIS — E78.00 PURE HYPERCHOLESTEROLEMIA: ICD-10-CM

## 2024-10-01 DIAGNOSIS — I10 PRIMARY HYPERTENSION: ICD-10-CM

## 2024-10-01 DIAGNOSIS — Z00.00 ENCOUNTER FOR ANNUAL HEALTH EXAMINATION: ICD-10-CM

## 2024-10-01 DIAGNOSIS — H91.90 DECREASED HEARING, UNSPECIFIED LATERALITY: ICD-10-CM

## 2024-10-01 DIAGNOSIS — M54.16 LUMBAR RADICULITIS: ICD-10-CM

## 2024-10-01 LAB
ALBUMIN SERPL-MCNC: 4.4 G/DL (ref 3.2–4.8)
ALBUMIN/GLOB SERPL: 1.8 {RATIO} (ref 1–2)
ALP LIVER SERPL-CCNC: 51 U/L
ALT SERPL-CCNC: 12 U/L
ANION GAP SERPL CALC-SCNC: 9 MMOL/L (ref 0–18)
AST SERPL-CCNC: 16 U/L (ref ?–34)
BASOPHILS # BLD AUTO: 0.03 X10(3) UL (ref 0–0.2)
BASOPHILS NFR BLD AUTO: 0.6 %
BILIRUB SERPL-MCNC: 1.4 MG/DL (ref 0.2–1.1)
BUN BLD-MCNC: 13 MG/DL (ref 9–23)
CALCIUM BLD-MCNC: 9.5 MG/DL (ref 8.7–10.4)
CHLORIDE SERPL-SCNC: 98 MMOL/L (ref 98–112)
CHOLEST SERPL-MCNC: 168 MG/DL (ref ?–200)
CO2 SERPL-SCNC: 28 MMOL/L (ref 21–32)
COMPLEXED PSA SERPL-MCNC: 0.94 NG/ML (ref ?–4)
CREAT BLD-MCNC: 1.09 MG/DL
EGFRCR SERPLBLD CKD-EPI 2021: 66 ML/MIN/1.73M2 (ref 60–?)
EOSINOPHIL # BLD AUTO: 0.1 X10(3) UL (ref 0–0.7)
EOSINOPHIL NFR BLD AUTO: 2.1 %
ERYTHROCYTE [DISTWIDTH] IN BLOOD BY AUTOMATED COUNT: 13.6 %
FASTING PATIENT LIPID ANSWER: YES
FASTING STATUS PATIENT QL REPORTED: YES
GLOBULIN PLAS-MCNC: 2.4 G/DL (ref 2–3.5)
GLUCOSE BLD-MCNC: 104 MG/DL (ref 70–99)
HCT VFR BLD AUTO: 42.5 %
HDLC SERPL-MCNC: 69 MG/DL (ref 40–59)
HGB BLD-MCNC: 14.4 G/DL
IMM GRANULOCYTES # BLD AUTO: 0.01 X10(3) UL (ref 0–1)
IMM GRANULOCYTES NFR BLD: 0.2 %
LDLC SERPL CALC-MCNC: 80 MG/DL (ref ?–100)
LYMPHOCYTES # BLD AUTO: 1.31 X10(3) UL (ref 1–4)
LYMPHOCYTES NFR BLD AUTO: 27.3 %
MCH RBC QN AUTO: 32.4 PG (ref 26–34)
MCHC RBC AUTO-ENTMCNC: 33.9 G/DL (ref 31–37)
MCV RBC AUTO: 95.7 FL
MONOCYTES # BLD AUTO: 0.37 X10(3) UL (ref 0.1–1)
MONOCYTES NFR BLD AUTO: 7.7 %
NEUTROPHILS # BLD AUTO: 2.97 X10 (3) UL (ref 1.5–7.7)
NEUTROPHILS # BLD AUTO: 2.97 X10(3) UL (ref 1.5–7.7)
NEUTROPHILS NFR BLD AUTO: 62.1 %
NONHDLC SERPL-MCNC: 99 MG/DL (ref ?–130)
OSMOLALITY SERPL CALC.SUM OF ELEC: 280 MOSM/KG (ref 275–295)
PLATELET # BLD AUTO: 165 10(3)UL (ref 150–450)
POTASSIUM SERPL-SCNC: 4.6 MMOL/L (ref 3.5–5.1)
PROT SERPL-MCNC: 6.8 G/DL (ref 5.7–8.2)
RBC # BLD AUTO: 4.44 X10(6)UL
SODIUM SERPL-SCNC: 135 MMOL/L (ref 136–145)
TRIGL SERPL-MCNC: 104 MG/DL (ref 30–149)
TSI SER-ACNC: 0.61 MIU/ML (ref 0.55–4.78)
VIT D+METAB SERPL-MCNC: 29.9 NG/ML (ref 30–100)
VLDLC SERPL CALC-MCNC: 16 MG/DL (ref 0–30)
WBC # BLD AUTO: 4.8 X10(3) UL (ref 4–11)

## 2024-10-01 PROCEDURE — 80053 COMPREHEN METABOLIC PANEL: CPT

## 2024-10-01 PROCEDURE — 86480 TB TEST CELL IMMUN MEASURE: CPT

## 2024-10-01 PROCEDURE — 80061 LIPID PANEL: CPT

## 2024-10-01 PROCEDURE — 85025 COMPLETE CBC W/AUTO DIFF WBC: CPT

## 2024-10-01 PROCEDURE — 82306 VITAMIN D 25 HYDROXY: CPT

## 2024-10-01 PROCEDURE — 84443 ASSAY THYROID STIM HORMONE: CPT

## 2024-10-01 PROCEDURE — 36415 COLL VENOUS BLD VENIPUNCTURE: CPT

## 2024-10-01 RX ORDER — METOPROLOL SUCCINATE 50 MG/1
50 TABLET, EXTENDED RELEASE ORAL DAILY
Qty: 90 TABLET | Refills: 3 | Status: SHIPPED | OUTPATIENT
Start: 2024-10-01

## 2024-10-01 RX ORDER — PREGABALIN 50 MG/1
50 CAPSULE ORAL 2 TIMES DAILY
Qty: 60 CAPSULE | Refills: 1 | Status: SHIPPED | OUTPATIENT
Start: 2024-10-01

## 2024-10-01 RX ORDER — ATORVASTATIN CALCIUM 10 MG/1
10 TABLET, FILM COATED ORAL NIGHTLY
Qty: 90 TABLET | Refills: 0 | Status: SHIPPED | OUTPATIENT
Start: 2024-10-01

## 2024-10-01 RX ORDER — LOSARTAN POTASSIUM AND HYDROCHLOROTHIAZIDE 12.5; 5 MG/1; MG/1
1 TABLET ORAL DAILY
Qty: 90 TABLET | Refills: 3 | Status: SHIPPED | OUTPATIENT
Start: 2024-10-01 | End: 2025-09-26

## 2024-10-01 NOTE — PROGRESS NOTES
Subjective:   Ozzie Zuleta is a 84 year old male who presents for a Medicare Subsequent Annual Wellness visit (Pt already had Initial Annual Wellness) and scheduled follow up of multiple significant but stable problems.     2. Primary hypertension. HTN.  Chronic. Uncontrolled.  No CP/SOB.  No symptoms.  No side effects    + vit D def.  Not taking vit D.  No fractures.  Mild fatigue.    Lumbar radiculitis.  Chronic.  On chronic norco.  Son states getting pain medication through pain doctor from now on.  They did not increase lyrica dosing as discussed at last visit.  Only took 25mg twice daily.    History/Other:   Fall Risk Assessment:   He has been screened for Falls and is High Risk. Fall Prevention information provided to patient in After Visit Summary.    Do you feel unsteady when standing or walking?: Yes  Do you worry about falling?: Yes  Have you fallen in the past year?: Yes  How many times have you fallen?: (P) 3  Were you injured?: (P) Yes     Cognitive Assessment:   He had a completely normal cognitive assessment - see flowsheet entries     Functional Ability/Status:   Ozzie Zuleta has some abnormal functions as listed below:  He has Dressing and/or Bathing issues based on screening of functional status.  Difficulty dressing or bathing?: Yes  Bathing or Showering: Need some help  Dressing: Able without help  He has Driving difficulties based on screening of functional status. He has Meal Preparation difficulties based on screening of functional status.He has difficulties Managing Money/Bills based on screening of functional status.He has difficulties Shopping for Groceries based on screening of functional status. He has Walking problems based on screening of functional status. He has problems with Daily Activities based on screening of functional status.       Depression Screening (PHQ):  PHQ-2 SCORE: 0  , done 9/30/2024             Advanced Directives:   He does NOT have a Living Will. [Do you  have a living will?: No]  He has a Power of  for Health Care on file in [facility].  Discussed Advance Care Planning with patient (and family/surrogate if present). Standard forms made available to patient in After Visit Summary.      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    Premature atrial contractions    Essential hypertension    At risk for falling    Gastroesophageal reflux disease without esophagitis    Hypothyroidism    Vitamin B12 deficiency    Renal cyst    Abnormal EKG    Right lower quadrant abdominal pain    Esophageal dysphagia    Dilation of biliary tract    Syncope    Weakness generalized    Depressive disorder    Syncope and collapse    Blunt head trauma, initial encounter    Abdominal pain, acute    Strain of neck muscle, initial encounter    Esophageal stricture    Orthostatic hypotension    Neck pain    Acute midline low back pain without sciatica     Allergies:  He is allergic to neomycin, neomycin, bactrim [sulfamethoxazole w/trimethoprim], sulfa antibiotics, versed, and flonase [fluticasone propionate].    Current Medications:  Outpatient Medications Marked as Taking for the 10/1/24 encounter (Office Visit) with Yo Dunlap,    Medication Sig    losartan-hydroCHLOROthiazide 50-12.5 MG Oral Tab Take 1 tablet by mouth daily.    metoprolol succinate ER 50 MG Oral Tablet 24 Hr Take 1 tablet (50 mg total) by mouth daily.    pregabalin (LYRICA) 50 MG Oral  Cap Take 1 capsule (50 mg total) by mouth 2 (two) times daily.    metoprolol succinate ER 25 MG Oral Tablet 24 Hr 1 tab daily x 1 week, then 2 tab daily    pregabalin (LYRICA) 25 MG Oral Cap Take 1 capsule (25 mg total) by mouth 2 (two) times daily.    HYDROcodone-acetaminophen (NORCO)  MG Oral Tab Take 1 tablet by mouth every 6 (six) hours as needed for Pain.    FLUDROCORTISONE 0.1 MG Oral Tab TAKE 1 TABLET BY MOUTH EVERY DAY    [DISCONTINUED] ATORVASTATIN 10 MG Oral Tab TAKE 1 TABLET BY MOUTH EVERY DAY AT NIGHT    mirtazapine 15 MG Oral Tab Take 1 tablet (15 mg total) by mouth nightly.    LEVOTHYROXINE 88 MCG Oral Tab TAKE 1 TABLET BY MOUTH BEFORE BREAKFAST.    cyanocobalamin 1000 MCG/ML Injection Solution Inject 1 mL (1,000 mcg total) into the muscle every 30 (thirty) days.    pantoprazole 40 MG Oral Tab EC Take 1 tablet (40 mg total) by mouth before breakfast.    Insulin Syringe-Needle U-100 (BD INSULIN SYRINGE ULTRAFINE) 31G X 5/16\" 1 ML Does not apply Misc Use for B12 injection    docusate sodium 100 MG Oral Cap Take 1 capsule (100 mg total) by mouth 2 (two) times daily.    sennosides 8.6 MG Oral Tab Take 2 tablets (17.2 mg total) by mouth daily as needed (constipation).    artificial saliva substitute Mouth/Throat Solution Take 10 mL by mouth in the morning, at noon, and at bedtime.    ondansetron (ZOFRAN) 4 mg tablet Take 1 tablet (4 mg total) by mouth every 8 (eight) hours as needed for Nausea.    albuterol 108 (90 Base) MCG/ACT Inhalation Aero Soln Inhale 2 puffs into the lungs every 6 (six) hours as needed for Wheezing.    fentaNYL 25 MCG/HR Transdermal Patch 72 Hr Place 1 patch onto the skin every 3 (three) days.    Naloxone HCl 4 MG/0.1ML Nasal Liquid 4 mg by Nasal route as needed. If patient remains unresponsive, repeat dose in other nostril 2-5 minutes after first dose.    acetaminophen 500 MG Oral Tab Take 1 tablet (500 mg total) by mouth every 6 (six) hours as needed for Pain.    psyllium 28 %  Oral Powd Pack Take 1 packet by mouth 2 (two) times daily.    Vitamin D3 (VITAMIN D3) 2000 UNITS Oral Cap Take 1 capsule (2,000 Units total) by mouth daily.       Medical History:  He  has a past medical history of Abdominal distention, Abdominal pain, Acute encephalopathy, Anemia, Back pain, Back problem, Black stools, Bladder incontinence, Bloating, Community acquired pneumonia of right middle lobe of lung, Constipation, Diarrhea, unspecified, Disorder of prostate, Dizziness, Esophageal reflux, Exposure to TB, Fatigue, Flatulence/gas pain/belching, Frequent use of laxatives, Hearing loss, Heart palpitations, Hemorrhoids, High blood pressure, High cholesterol, Hoarseness, chronic, Incontinence, Indigestion (03/01/2018), Irregular bowel habits, Leaking of urine, Loss of appetite, Other and unspecified hyperlipidemia, PAC (premature atrial contraction) (01/22/2015), Presence of other cardiac implants and grafts (Plate in chin), Problems with swallowing, Sleep disturbance, Stool incontinence, Unspecified disorder of thyroid, Unspecified essential hypertension, Unspecified gastritis and gastroduodenitis without mention of hemorrhage, Unspecified hypothyroidism, and Weight loss.  Surgical History:  He  has a past surgical history that includes colonoscopy (7/5/11); laser surgery of eye (6/09); other (08); tonsillectomy; injection, anesthetic/steroid, transforaminal epidural; lumbar/sacral, single level (9/7/2012); m-sedaj by  phys perfrmg svc 5+ yr (9/7/2012); injection, w/wo contrast, dx/therapeutic substance, epidural/subarachnoid; lumbar/sacral (9/28/2012); fluor gid & loclzj ndl/cath spi dx/ther njx (9/28/2012); injection, anesthetic/steroid, transforaminal epidural; lumbar/sacral, single level (4/2/2013); patient withough preoperative order for iv antibiotic surgical site infection prophylaxis. (4/2/2013); patient documented not to have experienced any of the following events (4/2/2013); percut implnt  neuroelect,epidural (12/18/2013); percut implnt neuroelect,epidural (12/18/2013); m-sedaj by  phys perfrmg svc 5+ yr (12/18/2013); patient withough preoperative order for iv antibiotic surgical site infection prophylaxis. (12/18/2013); patient documented not to have experienced any of the following events (12/18/2013); percut implnt neuroelec,periph (2/3/2014); percut implnt neuroelec,periph (2/3/2014); percut implnt neuroelec,periph (2/3/2014); percut implnt neuroelec,periph (2/3/2014); m-sedaj by  phys perfrmg svc 5+ yr (2/3/2014); patient with preoperative order for iv antibiotic surgical site infect (2/3/2014); patient documented not to have experienced any of the following events (2/3/2014); injection, w/wo contrast, dx/therapeutic substance, epidural/subarachnoid; lumbar/sacral (4/28/2014); fluor gid & loclzj ndl/cath spi dx/ther njx (4/28/2014); m-sedaj by  phys perfrmg svc 5+ yr (4/28/2014); patient withough preoperative order for iv antibiotic surgical site infection prophylaxis. (4/28/2014); patient documented not to have experienced any of the following events (4/28/2014); cataract; orthopedic surg (pbp) (5/2013 8/2013); hernia surgery (2014); back surgery (5/22/14); injection, w/wo contrast, dx/therapeutic substance, epidural/subarachnoid; lumbar/sacral (N/A, 1/15/2015); fluor gid & loclzj ndl/cath spi dx/ther njx (N/A, 1/15/2015); patient withough preoperative order for iv antibiotic surgical site infection prophylaxis. (N/A, 1/15/2015); patient documented not to have experienced any of the following events (N/A, 1/15/2015); upper gi endoscopy,exam; spine surgery procedure unlisted; other; colonoscopy; sigmoidoscopy,diagnostic; and needle biopsy liver.   Family History:  His family history includes Heart Disorder in his father and mother; Hypertension in his father and mother.  Social History:  He  reports that he has never smoked. He has never used smokeless tobacco. He reports that he does not  drink alcohol and does not use drugs.    Tobacco:  He has never smoked tobacco.    CAGE Alcohol Screen:   CAGE screening score of 0 on 9/30/2024, showing low risk of alcohol abuse.      Patient Care Team:  Yo Dunlap DO as PCP - General (Family Medicine)  Semaj Harley MD (GASTROENTEROLOGY)    Review of Systems   All other systems reviewed and are negative.         Objective:   Physical Exam  Vitals reviewed.   Constitutional:       General: He is not in acute distress.     Appearance: He is well-developed. He is not diaphoretic.   HENT:      Mouth/Throat:      Pharynx: No oropharyngeal exudate or posterior oropharyngeal erythema.   Eyes:      General: No scleral icterus.        Right eye: No discharge.         Left eye: No discharge.      Conjunctiva/sclera: Conjunctivae normal.   Cardiovascular:      Rate and Rhythm: Normal rate and regular rhythm.      Heart sounds: Normal heart sounds.   Pulmonary:      Effort: Pulmonary effort is normal. No respiratory distress.      Breath sounds: Normal breath sounds. No wheezing or rales.   Abdominal:      General: Abdomen is flat. There is no distension.      Palpations: Abdomen is soft. There is no mass.      Tenderness: There is no abdominal tenderness.   Genitourinary:     Comments: Refused to take off his cloths.  Skin:     Comments: Pt refused skin exam.   Neurological:      Comments: Remembered 3/3 objects.  Had refused exam with MA.   Psychiatric:         Mood and Affect: Mood normal.         Behavior: Behavior normal.         Thought Content: Thought content normal.         Judgment: Judgment normal.        BP (!) 170/86   Pulse 60   Resp 18   Ht 5' 10\" (1.778 m)   Wt 138 lb 3.2 oz (62.7 kg)   SpO2 97%   BMI 19.83 kg/m²  Estimated body mass index is 19.83 kg/m² as calculated from the following:    Height as of this encounter: 5' 10\" (1.778 m).    Weight as of this encounter: 138 lb 3.2 oz (62.7 kg).    Medicare Hearing Assessment:   Hearing Screening     Time taken: 10/1/2024 10:15 AM  Screening Method: Whisper Test  Whisper Test Result: Fail         Visual Acuity:   Right Eye Visual Acuity: Uncorrected Right Eye Chart Acuity: 20/100   Left Eye Visual Acuity: Uncorrected Left Eye Chart Acuity: 20/100   Both Eyes Visual Acuity: Uncorrected Both Eyes Chart Acuity: 20/100   Able To Tolerate Visual Acuity: No        Assessment & Plan:   Ozzie Zuleta is a 84 year old male who presents for a Medicare Assessment.     1. Encounter for annual health examination (Primary)  -     Lipid Panel; Future; Expected date: 10/01/2024  -     PSA Total, Screen; Future; Expected date: 10/01/2024  -     CBC With Differential With Platelet; Future; Expected date: 10/01/2024  -     Comp Metabolic Panel (14); Future; Expected date: 10/01/2024  -     TSH W Reflex To Free T4; Future; Expected date: 10/01/2024  -     Vitamin D; Future; Expected date: 10/01/2024  2. Primary hypertension  -     US KIDNEY W DOPPLER (QJS=00455/05682); Future; Expected date: 10/01/2024  -     Losartan Potassium-HCTZ; Take 1 tablet by mouth daily.  Dispense: 90 tablet; Refill: 3  -     Metoprolol Succinate ER; Take 1 tablet (50 mg total) by mouth daily.  Dispense: 90 tablet; Refill: 3  3. Encounter for screening for malignant neoplasm of prostate  -     PSA Total, Screen; Future; Expected date: 10/01/2024  4. Screening-pulmonary TB  -     Quantiferon TB Plus; Future; Expected date: 10/01/2024  5. Vitamin D deficiency  -     Vitamin D; Future; Expected date: 10/01/2024  6. Need for vaccination for pneumococcus  -     Prevnar 20 (PCV20) [37839]  7. Lumbar radiculitis      If 50mg twice daily not working.  Then try increasing to 75 mg twice daily.  If that doesn't work slowly wean off.  All this discussed with patient and his son.    8. Decreased hearing, unspecified laterality  -     ENT Referral - In Network    The patient indicates understanding of these issues and agrees to the plan.  Reinforced healthy  diet, lifestyle, and exercise.      Return in about 2 weeks (around 10/15/2024).     Yo Dunlap DO, 10/1/2024     Supplementary Documentation:   General Health:  In the past six months, have you lost more than 10 pounds without trying?: 2 - No  Has your appetite been poor?: Yes  Type of Diet: Other  How does the patient maintain a good energy level?: Other  How would you describe your daily physical activity?: None  How would you describe your current health state?: Poor  How do you maintain positive mental well-being?: Visiting Family  On a scale of 0 to 10, with 0 being no pain and 10 being severe pain, what is your pain level?: 7 - (Severe)  In the past six months, have you experienced urine leakage?: 1-Yes  At any time do you feel concerned for the safety/well-being of yourself and/or your children, in your home or elsewhere?: No  Have you had any immunizations at another office such as Influenza, Hepatitis B, Tetanus, or Pneumococcal?: No    Health Maintenance   Topic Date Due    Annual Physical  Never done    Zoster Vaccines (1 of 2) Never done    Pneumococcal Vaccine: 65+ Years (2 of 2 - PCV) 06/01/2012    COVID-19 Vaccine (3 - 2023-24 season) 09/01/2024    Influenza Vaccine (1) 10/01/2024    HTN: BP Follow-Up  10/10/2024    Annual Depression Screening  Completed    Fall Risk Screening (Annual)  Completed

## 2024-10-01 NOTE — TELEPHONE ENCOUNTER
Patient need refill on medication:      ATORVASTATIN 10 MG Oral Tab     Please send script to:[facility]/PHARMACY #[id number] - [place], [place] - [address] AT [address], [phone], [phone]     Please advise

## 2024-10-03 ENCOUNTER — PATIENT MESSAGE (OUTPATIENT)
Dept: FAMILY MEDICINE CLINIC | Facility: CLINIC | Age: 84
End: 2024-10-03

## 2024-10-03 LAB
M TB IFN-G CD4+ T-CELLS BLD-ACNC: 0.03 IU/ML
M TB TUBERC IFN-G BLD QL: NEGATIVE
M TB TUBERC IGNF/MITOGEN IGNF CONTROL: 3.78 IU/ML
QFT TB1 AG MINUS NIL: -0.02 IU/ML
QFT TB2 AG MINUS NIL: -0.01 IU/ML

## 2024-10-03 RX ORDER — LEVOTHYROXINE SODIUM 88 UG/1
88 TABLET ORAL
Qty: 90 TABLET | Refills: 1 | Status: SHIPPED | OUTPATIENT
Start: 2024-10-03

## 2024-10-03 NOTE — TELEPHONE ENCOUNTER
From: Ozize Zuleta  To: Yo Dunlap  Sent: 10/3/2024 11:21 AM CDT  Subject: Medication Refill    Dr Dunlap. I need a refill on Levothyroxine 88 Mcg for my thyroid. 90 day supply was last filled on June 26th. [facility] on [address] in [place]. Thank you.

## 2024-10-04 DIAGNOSIS — F32.A DEPRESSIVE DISORDER: ICD-10-CM

## 2024-10-04 RX ORDER — MIRTAZAPINE 15 MG/1
15 TABLET, FILM COATED ORAL NIGHTLY
Qty: 90 TABLET | Refills: 1 | Status: SHIPPED | OUTPATIENT
Start: 2024-10-04 | End: 2024-12-31

## 2024-10-04 NOTE — TELEPHONE ENCOUNTER
A refill request was received for:  Requested Prescriptions     Pending Prescriptions Disp Refills    mirtazapine 15 MG Oral Tab 90 tablet 0     Sig: Take 1 tablet (15 mg total) by mouth nightly.       Last refill date:   6/3/2024    Last office visit: 10/1/2024    Follow up due:  No future appointments.

## 2024-11-08 DIAGNOSIS — I10 PRIMARY HYPERTENSION: ICD-10-CM

## 2024-11-08 RX ORDER — METOPROLOL SUCCINATE 25 MG/1
TABLET, EXTENDED RELEASE ORAL
Qty: 60 TABLET | Refills: 1 | Status: SHIPPED | OUTPATIENT
Start: 2024-11-08

## 2024-11-26 NOTE — TELEPHONE ENCOUNTER
Patients son Jono called checking on the status of a refill request of his fathers medication:   pantoprazole 40 MG Oral Tab MIMI Alfaro was last prescriber.     Asked for us to contact him back when we have an update. Please Advise.  Jono (son's) number: [phone]

## 2024-11-26 NOTE — TELEPHONE ENCOUNTER
OV with Dr. Dunlap on 10/01/24  Pantoprazole 40 mg  #90 on 12/21/23  Script pending physician signature.

## 2024-11-28 RX ORDER — PANTOPRAZOLE SODIUM 40 MG/1
40 TABLET, DELAYED RELEASE ORAL
Qty: 90 TABLET | Refills: 0 | Status: SHIPPED | OUTPATIENT
Start: 2024-11-28

## 2024-12-29 DIAGNOSIS — F32.A DEPRESSIVE DISORDER: ICD-10-CM

## 2024-12-30 RX ORDER — LEVOTHYROXINE SODIUM 88 UG/1
88 TABLET ORAL
Qty: 90 TABLET | Refills: 1 | Status: SHIPPED | OUTPATIENT
Start: 2024-12-30

## 2024-12-30 NOTE — TELEPHONE ENCOUNTER
A refill request was received for:  Requested Prescriptions     Pending Prescriptions Disp Refills    MIRTAZAPINE 15 MG Oral Tab [Pharmacy Med Name: MIRTAZAPINE 15 MG TABLET] 90 tablet 1     Sig: TAKE 1 TABLET BY MOUTH EVERY DAY AT NIGHT     Signed Prescriptions Disp Refills    LEVOTHYROXINE 88 MCG Oral Tab 90 tablet 1     Sig: TAKE 1 TABLET BY MOUTH BEFORE BREAKFAST.     Authorizing Provider: FLOYD NAVARRO     Ordering User: ADALBERTO CROSS       Last refill date:   10/4/2024    Last office visit: 10/1/2024    Follow up due:  No future appointments.

## 2024-12-31 RX ORDER — MIRTAZAPINE 15 MG/1
15 TABLET, FILM COATED ORAL NIGHTLY
Qty: 90 TABLET | Refills: 1 | Status: SHIPPED | OUTPATIENT
Start: 2024-12-31

## 2025-01-17 DIAGNOSIS — I10 PRIMARY HYPERTENSION: ICD-10-CM

## 2025-01-17 NOTE — TELEPHONE ENCOUNTER
A refill request was received for:  Requested Prescriptions     Pending Prescriptions Disp Refills    METOPROLOL SUCCINATE ER 25 MG Oral Tablet 24 Hr [Pharmacy Med Name: METOPROLOL SUCC ER 25 MG TAB] 60 tablet 1     Sig: TAKE 1 TABLET DAILY FOR 1 WEEK, THEN 2 TABLET DAILY       Last refill date:   11-8-24    Last office visit: 10-1-24    Follow up due:  No future appointments.

## 2025-01-18 RX ORDER — METOPROLOL SUCCINATE 25 MG/1
TABLET, EXTENDED RELEASE ORAL
Qty: 60 TABLET | Refills: 1 | Status: SHIPPED | OUTPATIENT
Start: 2025-01-18

## 2025-01-22 ENCOUNTER — TELEPHONE (OUTPATIENT)
Dept: FAMILY MEDICINE CLINIC | Facility: CLINIC | Age: 85
End: 2025-01-22

## 2025-01-22 RX ORDER — FLUDROCORTISONE ACETATE 0.1 MG/1
0.1 TABLET ORAL DAILY
Qty: 30 TABLET | Refills: 1 | Status: SHIPPED | OUTPATIENT
Start: 2025-01-22

## 2025-01-22 NOTE — TELEPHONE ENCOUNTER
Dr. Dunlap,  Refill request  Last refill  7/18/24 #30 +5  Last office visit - 10/1/24    Medication pended

## 2025-03-20 DIAGNOSIS — I10 PRIMARY HYPERTENSION: ICD-10-CM

## 2025-03-20 RX ORDER — METOPROLOL SUCCINATE 25 MG/1
TABLET, EXTENDED RELEASE ORAL
Qty: 60 TABLET | Refills: 1 | Status: SHIPPED | OUTPATIENT
Start: 2025-03-20

## 2025-03-20 RX ORDER — FLUDROCORTISONE ACETATE 0.1 MG/1
0.1 TABLET ORAL DAILY
Qty: 30 TABLET | Refills: 1 | Status: SHIPPED | OUTPATIENT
Start: 2025-03-20

## 2025-03-20 NOTE — TELEPHONE ENCOUNTER
.A refill request was received for:  Requested Prescriptions     Pending Prescriptions Disp Refills    METOPROLOL SUCCINATE ER 25 MG Oral Tablet 24 Hr [Pharmacy Med Name: METOPROLOL SUCC ER 25 MG TAB] 60 tablet 1     Sig: TAKE 1 TABLET DAILY FOR 1 WEEK, THEN 2 TABLET DAILY    FLUDROCORTISONE 0.1 MG Oral Tab [Pharmacy Med Name: FLUDROCORTISONE 0.1 MG TABLET] 30 tablet 1     Sig: TAKE 1 TABLET BY MOUTH EVERY DAY       Last refill date:   metoprolol 1/22/25  Fludrocortisone 1/22/25    Last office visit: 10/1/24    Follow up due: message sent to schedule f/u  No future appointments.

## 2025-04-17 DIAGNOSIS — I10 PRIMARY HYPERTENSION: ICD-10-CM

## 2025-04-17 RX ORDER — METOPROLOL SUCCINATE 25 MG/1
TABLET, EXTENDED RELEASE ORAL
Qty: 60 TABLET | Refills: 1 | Status: CANCELLED | OUTPATIENT
Start: 2025-04-17

## 2025-04-17 NOTE — TELEPHONE ENCOUNTER
A refill request was received for:  Requested Prescriptions     Pending Prescriptions Disp Refills    metoprolol succinate ER 25 MG Oral Tablet 24 Hr 60 tablet 1     Sig: TAKE 1 TABLET DAILY FOR 1 WEEK, THEN 2 TABLET DAILY       Last refill date:   3/20/25    Last office visit:     Follow up due:  No future appointments.

## 2025-04-17 NOTE — TELEPHONE ENCOUNTER
A refill request was received for:  Requested Prescriptions     Pending Prescriptions Disp Refills    metoprolol succinate ER 25 MG Oral Tablet 24 Hr 60 tablet 1     Sig: TAKE 1 TABLET DAILY FOR 1 WEEK, THEN 2 TABLET DAILY       Last refill date:   3/20/25    Last office visit: 10/01/24    Follow up due:  No future appointments.

## 2025-04-18 RX ORDER — METOPROLOL SUCCINATE 25 MG/1
TABLET, EXTENDED RELEASE ORAL
Qty: 60 TABLET | Refills: 1 | Status: SHIPPED | OUTPATIENT
Start: 2025-04-18

## 2025-05-23 NOTE — TELEPHONE ENCOUNTER
A refill request was received for:  Requested Prescriptions     Pending Prescriptions Disp Refills    FLUDROCORTISONE 0.1 MG Oral Tab [Pharmacy Med Name: FLUDROCORTISONE 0.1 MG TABLET] 30 tablet 1     Sig: TAKE 1 TABLET BY MOUTH EVERY DAY       Last refill date:   3-20-25    Last office visit: 10-1-24    Follow up due:  No future appointments.

## 2025-05-26 RX ORDER — FLUDROCORTISONE ACETATE 0.1 MG/1
0.1 TABLET ORAL DAILY
Qty: 30 TABLET | Refills: 1 | Status: SHIPPED | OUTPATIENT
Start: 2025-05-26

## 2025-07-05 DIAGNOSIS — M54.16 LUMBAR RADICULITIS: ICD-10-CM

## 2025-07-07 NOTE — TELEPHONE ENCOUNTER
A refill request was received for:  Requested Prescriptions     Pending Prescriptions Disp Refills    PREGABALIN 50 MG Oral Cap [Pharmacy Med Name: PREGABALIN 50 MG CAPSULE] 60 capsule 0     Sig: TAKE 1 CAPSULE BY MOUTH 2 TIMES DAILY.       Last refill date: 10/1/2024      Last office visit: 10/2024    Follow up due:  No future appointments.

## 2025-07-10 RX ORDER — PREGABALIN 50 MG/1
50 CAPSULE ORAL 2 TIMES DAILY
Qty: 60 CAPSULE | Refills: 5 | Status: SHIPPED | OUTPATIENT
Start: 2025-07-10

## 2025-07-24 DIAGNOSIS — I10 PRIMARY HYPERTENSION: ICD-10-CM

## 2025-07-24 RX ORDER — FLUDROCORTISONE ACETATE 0.1 MG/1
0.1 TABLET ORAL DAILY
Qty: 30 TABLET | Refills: 1 | Status: SHIPPED | OUTPATIENT
Start: 2025-07-24

## 2025-07-24 RX ORDER — METOPROLOL SUCCINATE 25 MG/1
TABLET, EXTENDED RELEASE ORAL
Qty: 60 TABLET | Refills: 1 | Status: SHIPPED | OUTPATIENT
Start: 2025-07-24

## 2025-07-24 NOTE — TELEPHONE ENCOUNTER
A refill request was received for:  Requested Prescriptions     Pending Prescriptions Disp Refills    metoprolol succinate ER 25 MG Oral Tablet 24 Hr [Pharmacy Med Name: METOPROLOL SUCC ER 25 MG TAB] 60 tablet 1     Sig: TAKE 1 TABLET DAILY FOR 1 WEEK, THEN TAKE 2 TABLETS DAILY    FLUDROCORTISONE 0.1 MG Oral Tab [Pharmacy Med Name: FLUDROCORTISONE 0.1 MG TABLET] 30 tablet 1     Sig: TAKE 1 TABLET BY MOUTH EVERY DAY       Last refill date: 04/18/25 metoprolol    Fludrocortisone 05/26/25    Last office visit: 10/01/24      No future appointments.

## 2025-07-28 ENCOUNTER — HOSPITAL ENCOUNTER (OUTPATIENT)
Facility: HOSPITAL | Age: 85
Setting detail: OBSERVATION
Discharge: HOME | End: 2025-07-29
Attending: EMERGENCY MEDICINE | Admitting: HOSPITALIST

## 2025-07-28 ENCOUNTER — APPOINTMENT (OUTPATIENT)
Dept: CT IMAGING | Age: 85
End: 2025-07-28
Attending: PHYSICIAN ASSISTANT

## 2025-07-28 ENCOUNTER — TELEPHONE (OUTPATIENT)
Dept: FAMILY MEDICINE CLINIC | Facility: CLINIC | Age: 85
End: 2025-07-28

## 2025-07-28 DIAGNOSIS — I95.1 ORTHOSTATIC HYPOTENSION: ICD-10-CM

## 2025-07-28 DIAGNOSIS — N17.9 ACUTE RENAL FAILURE, UNSPECIFIED ACUTE RENAL FAILURE TYPE: Primary | ICD-10-CM

## 2025-07-28 DIAGNOSIS — T50.905A ADVERSE EFFECT OF DRUG, INITIAL ENCOUNTER: ICD-10-CM

## 2025-07-28 DIAGNOSIS — E86.0 DEHYDRATION: ICD-10-CM

## 2025-07-28 DIAGNOSIS — K86.2 PANCREATIC CYST (HCC): ICD-10-CM

## 2025-07-28 DIAGNOSIS — R17 ELEVATED BILIRUBIN: ICD-10-CM

## 2025-07-28 DIAGNOSIS — R42 DIZZINESS: ICD-10-CM

## 2025-07-28 LAB
ALBUMIN SERPL-MCNC: 4.5 G/DL (ref 3.2–4.8)
ALBUMIN/GLOB SERPL: 1.8 (ref 1–2)
ALP LIVER SERPL-CCNC: 50 U/L (ref 45–117)
ALT SERPL-CCNC: 8 U/L (ref 10–49)
ANION GAP SERPL CALC-SCNC: 9 MMOL/L (ref 0–18)
AST SERPL-CCNC: 18 U/L (ref ?–34)
BASOPHILS # BLD AUTO: 0.04 X10(3) UL (ref 0–0.2)
BASOPHILS NFR BLD AUTO: 0.4 %
BILIRUB SERPL-MCNC: 2 MG/DL (ref 0.2–1.1)
BILIRUB UR QL STRIP.AUTO: NEGATIVE
BUN BLD-MCNC: 28 MG/DL (ref 9–23)
CALCIUM BLD-MCNC: 9.3 MG/DL (ref 8.7–10.6)
CHLORIDE SERPL-SCNC: 99 MMOL/L (ref 98–112)
CLARITY UR REFRACT.AUTO: CLEAR
CO2 SERPL-SCNC: 28 MMOL/L (ref 21–32)
COLOR UR AUTO: YELLOW
CREAT BLD-MCNC: 1.73 MG/DL (ref 0.7–1.3)
EGFRCR SERPLBLD CKD-EPI 2021: 38 ML/MIN/1.73M2 (ref 60–?)
EOSINOPHIL # BLD AUTO: 0.5 X10(3) UL (ref 0–0.7)
EOSINOPHIL NFR BLD AUTO: 5.1 %
ERYTHROCYTE [DISTWIDTH] IN BLOOD BY AUTOMATED COUNT: 13.9 %
GLOBULIN PLAS-MCNC: 2.5 G/DL (ref 2–3.5)
GLUCOSE BLD-MCNC: 106 MG/DL (ref 70–99)
GLUCOSE UR STRIP.AUTO-MCNC: NEGATIVE MG/DL
HCT VFR BLD AUTO: 45 % (ref 39–53)
HGB BLD-MCNC: 15.8 G/DL (ref 13–17.5)
HYALINE CASTS #/AREA URNS AUTO: PRESENT /LPF
IMM GRANULOCYTES # BLD AUTO: 0.05 X10(3) UL (ref 0–1)
IMM GRANULOCYTES NFR BLD: 0.5 %
KETONES UR STRIP.AUTO-MCNC: NEGATIVE MG/DL
LEUKOCYTE ESTERASE UR QL STRIP.AUTO: NEGATIVE
LYMPHOCYTES # BLD AUTO: 1.78 X10(3) UL (ref 1–4)
LYMPHOCYTES NFR BLD AUTO: 18.1 %
MCH RBC QN AUTO: 33.8 PG (ref 26–34)
MCHC RBC AUTO-ENTMCNC: 35.1 G/DL (ref 31–37)
MCV RBC AUTO: 96.2 FL (ref 80–100)
MONOCYTES # BLD AUTO: 0.54 X10(3) UL (ref 0.1–1)
MONOCYTES NFR BLD AUTO: 5.5 %
NEUTROPHILS # BLD AUTO: 6.9 X10 (3) UL (ref 1.5–7.7)
NEUTROPHILS # BLD AUTO: 6.9 X10(3) UL (ref 1.5–7.7)
NEUTROPHILS NFR BLD AUTO: 70.4 %
NITRITE UR QL STRIP.AUTO: NEGATIVE
OSMOLALITY SERPL CALC.SUM OF ELEC: 288 MOSM/KG (ref 275–295)
PH UR STRIP.AUTO: 5.5 (ref 5–8)
PLATELET # BLD AUTO: 206 10(3)UL (ref 150–450)
POTASSIUM SERPL-SCNC: 4 MMOL/L (ref 3.5–5.1)
PROT SERPL-MCNC: 7 G/DL (ref 5.7–8.2)
PROT UR STRIP.AUTO-MCNC: NEGATIVE MG/DL
RBC # BLD AUTO: 4.68 X10(6)UL (ref 3.8–5.8)
SODIUM SERPL-SCNC: 136 MMOL/L (ref 136–145)
SP GR UR STRIP.AUTO: 1.01 (ref 1–1.03)
TROPONIN I SERPL HS-MCNC: 10 NG/L (ref ?–53)
UROBILINOGEN UR STRIP.AUTO-MCNC: 0.2 MG/DL
WBC # BLD AUTO: 9.8 X10(3) UL (ref 4–11)

## 2025-07-28 PROCEDURE — 74177 CT ABD & PELVIS W/CONTRAST: CPT | Performed by: PHYSICIAN ASSISTANT

## 2025-07-28 PROCEDURE — 70450 CT HEAD/BRAIN W/O DYE: CPT | Performed by: PHYSICIAN ASSISTANT

## 2025-07-28 RX ORDER — FAMOTIDINE 10 MG/ML
20 INJECTION, SOLUTION INTRAVENOUS ONCE
Status: COMPLETED | OUTPATIENT
Start: 2025-07-28 | End: 2025-07-28

## 2025-07-28 RX ORDER — DIPHENHYDRAMINE HYDROCHLORIDE 50 MG/ML
25 INJECTION, SOLUTION INTRAMUSCULAR; INTRAVENOUS ONCE
Status: COMPLETED | OUTPATIENT
Start: 2025-07-28 | End: 2025-07-28

## 2025-07-29 ENCOUNTER — APPOINTMENT (OUTPATIENT)
Dept: MRI IMAGING | Facility: HOSPITAL | Age: 85
End: 2025-07-29
Attending: HOSPITALIST

## 2025-07-29 VITALS
OXYGEN SATURATION: 95 % | RESPIRATION RATE: 16 BRPM | DIASTOLIC BLOOD PRESSURE: 91 MMHG | BODY MASS INDEX: 17.18 KG/M2 | TEMPERATURE: 98 F | SYSTOLIC BLOOD PRESSURE: 164 MMHG | HEIGHT: 70 IN | HEART RATE: 72 BPM | WEIGHT: 120 LBS

## 2025-07-29 PROBLEM — E86.0 DEHYDRATION: Status: ACTIVE | Noted: 2025-07-29

## 2025-07-29 PROBLEM — R42 DIZZINESS: Status: ACTIVE | Noted: 2025-07-29

## 2025-07-29 PROBLEM — T50.905A ADVERSE EFFECT OF DRUG, INITIAL ENCOUNTER: Status: ACTIVE | Noted: 2025-07-29

## 2025-07-29 PROBLEM — R17 ELEVATED BILIRUBIN: Status: ACTIVE | Noted: 2025-07-29

## 2025-07-29 PROBLEM — K86.2 PANCREATIC CYST (HCC): Status: ACTIVE | Noted: 2025-07-29

## 2025-07-29 LAB
ATRIAL RATE: 67 BPM
EST. AVERAGE GLUCOSE BLD GHB EST-MCNC: 128 MG/DL (ref 68–126)
HBA1C MFR BLD: 6.1 % (ref ?–5.7)
P AXIS: 63 DEGREES
P-R INTERVAL: 148 MS
Q-T INTERVAL: 372 MS
QRS DURATION: 90 MS
QTC CALCULATION (BEZET): 393 MS
R AXIS: -2 DEGREES
T AXIS: 11 DEGREES
VENTRICULAR RATE: 67 BPM
VIT B12 SERPL-MCNC: 999 PG/ML (ref 211–911)

## 2025-07-29 PROCEDURE — 70551 MRI BRAIN STEM W/O DYE: CPT | Performed by: HOSPITALIST

## 2025-07-29 PROCEDURE — 99497 ADVNCD CARE PLAN 30 MIN: CPT | Performed by: HOSPITALIST

## 2025-07-29 PROCEDURE — 99236 HOSP IP/OBS SAME DATE HI 85: CPT | Performed by: HOSPITALIST

## 2025-07-29 PROCEDURE — 99223 1ST HOSP IP/OBS HIGH 75: CPT | Performed by: OTHER

## 2025-07-29 RX ORDER — PREGABALIN 50 MG/1
50 CAPSULE ORAL 2 TIMES DAILY
Status: DISCONTINUED | OUTPATIENT
Start: 2025-07-29 | End: 2025-07-29

## 2025-07-29 RX ORDER — FLUDROCORTISONE ACETATE 0.1 MG/1
0.1 TABLET ORAL DAILY
Status: DISCONTINUED | OUTPATIENT
Start: 2025-07-29 | End: 2025-07-29

## 2025-07-29 RX ORDER — DOCUSATE SODIUM 100 MG/1
100 CAPSULE, LIQUID FILLED ORAL 2 TIMES DAILY
Status: DISCONTINUED | OUTPATIENT
Start: 2025-07-29 | End: 2025-07-29

## 2025-07-29 RX ORDER — METOPROLOL SUCCINATE 50 MG/1
50 TABLET, EXTENDED RELEASE ORAL
Status: DISCONTINUED | OUTPATIENT
Start: 2025-07-29 | End: 2025-07-29

## 2025-07-29 RX ORDER — PANTOPRAZOLE SODIUM 40 MG/1
40 TABLET, DELAYED RELEASE ORAL
Status: DISCONTINUED | OUTPATIENT
Start: 2025-07-29 | End: 2025-07-29

## 2025-07-29 RX ORDER — HEPARIN SODIUM 5000 [USP'U]/ML
5000 INJECTION, SOLUTION INTRAVENOUS; SUBCUTANEOUS EVERY 12 HOURS SCHEDULED
Status: DISCONTINUED | OUTPATIENT
Start: 2025-07-29 | End: 2025-07-29

## 2025-07-29 RX ORDER — POLYETHYLENE GLYCOL 3350 17 G/17G
17 POWDER, FOR SOLUTION ORAL DAILY PRN
Status: DISCONTINUED | OUTPATIENT
Start: 2025-07-29 | End: 2025-07-29

## 2025-07-29 RX ORDER — ATORVASTATIN CALCIUM 10 MG/1
10 TABLET, FILM COATED ORAL NIGHTLY
Status: DISCONTINUED | OUTPATIENT
Start: 2025-07-29 | End: 2025-07-29

## 2025-07-29 RX ORDER — MIRTAZAPINE 7.5 MG/1
15 TABLET, FILM COATED ORAL NIGHTLY
Status: DISCONTINUED | OUTPATIENT
Start: 2025-07-29 | End: 2025-07-29

## 2025-07-29 RX ORDER — METOCLOPRAMIDE HYDROCHLORIDE 5 MG/ML
10 INJECTION INTRAMUSCULAR; INTRAVENOUS EVERY 8 HOURS PRN
Status: DISCONTINUED | OUTPATIENT
Start: 2025-07-29 | End: 2025-07-29

## 2025-07-29 RX ORDER — DIPHENHYDRAMINE HCL 25 MG
50 CAPSULE ORAL EVERY 6 HOURS
Status: DISCONTINUED | OUTPATIENT
Start: 2025-07-29 | End: 2025-07-29

## 2025-07-29 RX ORDER — SODIUM CHLORIDE 9 MG/ML
INJECTION, SOLUTION INTRAVENOUS CONTINUOUS
Status: DISCONTINUED | OUTPATIENT
Start: 2025-07-29 | End: 2025-07-29

## 2025-07-29 RX ORDER — BISACODYL 10 MG
10 SUPPOSITORY, RECTAL RECTAL
Status: DISCONTINUED | OUTPATIENT
Start: 2025-07-29 | End: 2025-07-29

## 2025-07-29 RX ORDER — METOCLOPRAMIDE HYDROCHLORIDE 5 MG/ML
5 INJECTION INTRAMUSCULAR; INTRAVENOUS EVERY 8 HOURS PRN
Status: DISCONTINUED | OUTPATIENT
Start: 2025-07-29 | End: 2025-07-29

## 2025-07-29 RX ORDER — SENNOSIDES 8.6 MG
17.2 TABLET ORAL NIGHTLY PRN
Status: DISCONTINUED | OUTPATIENT
Start: 2025-07-29 | End: 2025-07-29

## 2025-07-29 RX ORDER — FAMOTIDINE 10 MG/ML
20 INJECTION, SOLUTION INTRAVENOUS DAILY
Status: DISCONTINUED | OUTPATIENT
Start: 2025-07-29 | End: 2025-07-29

## 2025-07-29 RX ORDER — ONDANSETRON 2 MG/ML
4 INJECTION INTRAMUSCULAR; INTRAVENOUS EVERY 6 HOURS PRN
Status: DISCONTINUED | OUTPATIENT
Start: 2025-07-29 | End: 2025-07-29

## 2025-07-29 RX ORDER — LEVOTHYROXINE SODIUM 88 UG/1
88 TABLET ORAL
Status: DISCONTINUED | OUTPATIENT
Start: 2025-07-29 | End: 2025-07-29

## 2025-07-29 RX ORDER — ACETAMINOPHEN 500 MG
500 TABLET ORAL EVERY 4 HOURS PRN
Status: DISCONTINUED | OUTPATIENT
Start: 2025-07-29 | End: 2025-07-29

## 2025-07-29 RX ORDER — PREDNISONE 20 MG/1
40 TABLET ORAL ONCE
Status: COMPLETED | OUTPATIENT
Start: 2025-07-29 | End: 2025-07-29

## 2025-07-30 ENCOUNTER — PATIENT OUTREACH (OUTPATIENT)
Dept: CASE MANAGEMENT | Age: 85
End: 2025-07-30

## 2025-07-31 LAB
ALBUMIN SERPL ELPH-MCNC: 3.62 G/DL (ref 3.75–5.21)
ALBUMIN/GLOB SERPL: 1.4 (ref 1–2)
ALPHA1 GLOB SERPL ELPH-MCNC: 0.24 G/DL (ref 0.19–0.46)
ALPHA2 GLOB SERPL ELPH-MCNC: 0.89 G/DL (ref 0.48–1.05)
B-GLOBULIN SERPL ELPH-MCNC: 0.74 G/DL (ref 0.68–1.23)
GAMMA GLOB SERPL ELPH-MCNC: 0.71 G/DL (ref 0.62–1.7)
KAPPA LC FREE SER-MCNC: 2.51 MG/DL (ref 0.33–1.94)
KAPPA LC FREE/LAMBDA FREE SER NEPH: 1.5 (ref 0.26–1.65)
LAMBDA LC FREE SERPL-MCNC: 1.68 MG/DL (ref 0.57–2.63)
PROT SERPL-MCNC: 6.2 G/DL (ref 5.7–8.2)

## 2025-08-01 ENCOUNTER — LAB ENCOUNTER (OUTPATIENT)
Dept: LAB | Age: 85
End: 2025-08-01
Attending: FAMILY MEDICINE

## 2025-08-01 ENCOUNTER — OFFICE VISIT (OUTPATIENT)
Dept: FAMILY MEDICINE CLINIC | Facility: CLINIC | Age: 85
End: 2025-08-01

## 2025-08-01 VITALS
BODY MASS INDEX: 24.34 KG/M2 | DIASTOLIC BLOOD PRESSURE: 88 MMHG | OXYGEN SATURATION: 95 % | WEIGHT: 170 LBS | HEIGHT: 70 IN | HEART RATE: 69 BPM | SYSTOLIC BLOOD PRESSURE: 152 MMHG

## 2025-08-01 DIAGNOSIS — G47.00 INSOMNIA, UNSPECIFIED TYPE: ICD-10-CM

## 2025-08-01 DIAGNOSIS — E53.8 VITAMIN B12 DEFICIENCY: ICD-10-CM

## 2025-08-01 DIAGNOSIS — E78.00 PURE HYPERCHOLESTEROLEMIA: ICD-10-CM

## 2025-08-01 DIAGNOSIS — N17.9 ACUTE RENAL FAILURE, UNSPECIFIED ACUTE RENAL FAILURE TYPE: Primary | ICD-10-CM

## 2025-08-01 DIAGNOSIS — L50.9 HIVES: ICD-10-CM

## 2025-08-01 DIAGNOSIS — N17.9 ACUTE RENAL FAILURE, UNSPECIFIED ACUTE RENAL FAILURE TYPE: ICD-10-CM

## 2025-08-01 LAB
ANION GAP SERPL CALC-SCNC: 7 MMOL/L (ref 0–18)
BUN BLD-MCNC: 16 MG/DL (ref 9–23)
CALCIUM BLD-MCNC: 9 MG/DL (ref 8.7–10.6)
CHLORIDE SERPL-SCNC: 100 MMOL/L (ref 98–112)
CO2 SERPL-SCNC: 33 MMOL/L (ref 21–32)
CREAT BLD-MCNC: 1.4 MG/DL (ref 0.7–1.3)
EGFRCR SERPLBLD CKD-EPI 2021: 49 ML/MIN/1.73M2 (ref 60–?)
FASTING STATUS PATIENT QL REPORTED: YES
GLUCOSE BLD-MCNC: 83 MG/DL (ref 70–99)
OSMOLALITY SERPL CALC.SUM OF ELEC: 290 MOSM/KG (ref 275–295)
POTASSIUM SERPL-SCNC: 3.3 MMOL/L (ref 3.5–5.1)
SODIUM SERPL-SCNC: 140 MMOL/L (ref 136–145)

## 2025-08-01 PROCEDURE — 36415 COLL VENOUS BLD VENIPUNCTURE: CPT

## 2025-08-01 PROCEDURE — 80048 BASIC METABOLIC PNL TOTAL CA: CPT

## 2025-08-01 RX ORDER — PREDNISONE 20 MG/1
TABLET ORAL
Qty: 13 TABLET | Refills: 0 | Status: SHIPPED | OUTPATIENT
Start: 2025-08-01

## 2025-08-01 RX ORDER — MIRTAZAPINE 30 MG/1
30 TABLET, FILM COATED ORAL NIGHTLY
Qty: 30 TABLET | Refills: 0 | Status: SHIPPED | OUTPATIENT
Start: 2025-08-01

## 2025-08-01 RX ORDER — ATORVASTATIN CALCIUM 10 MG/1
10 TABLET, FILM COATED ORAL NIGHTLY
Qty: 90 TABLET | Refills: 0 | Status: SHIPPED | OUTPATIENT
Start: 2025-08-01

## 2025-08-01 RX ORDER — SYRINGE-NEEDLE,INSULIN,0.5 ML 27GX1/2"
SYRINGE, EMPTY DISPOSABLE MISCELLANEOUS
Qty: 10 EACH | Refills: 2 | Status: SHIPPED | OUTPATIENT
Start: 2025-08-01

## 2025-08-15 ENCOUNTER — OFFICE VISIT (OUTPATIENT)
Dept: FAMILY MEDICINE CLINIC | Facility: CLINIC | Age: 85
End: 2025-08-15

## 2025-08-15 ENCOUNTER — LAB ENCOUNTER (OUTPATIENT)
Dept: LAB | Age: 85
End: 2025-08-15
Attending: FAMILY MEDICINE

## 2025-08-15 VITALS
SYSTOLIC BLOOD PRESSURE: 140 MMHG | DIASTOLIC BLOOD PRESSURE: 82 MMHG | HEIGHT: 70 IN | OXYGEN SATURATION: 96 % | HEART RATE: 62 BPM | BODY MASS INDEX: 24.05 KG/M2 | WEIGHT: 168 LBS | RESPIRATION RATE: 18 BRPM

## 2025-08-15 DIAGNOSIS — I10 PRIMARY HYPERTENSION: Primary | ICD-10-CM

## 2025-08-15 DIAGNOSIS — E87.6 LOW BLOOD POTASSIUM: ICD-10-CM

## 2025-08-15 DIAGNOSIS — R42 DIZZY: ICD-10-CM

## 2025-08-15 DIAGNOSIS — N17.9 ACUTE RENAL FAILURE, UNSPECIFIED ACUTE RENAL FAILURE TYPE: ICD-10-CM

## 2025-08-15 DIAGNOSIS — G47.00 INSOMNIA, UNSPECIFIED TYPE: ICD-10-CM

## 2025-08-15 DIAGNOSIS — K21.9 CHRONIC GERD: ICD-10-CM

## 2025-08-15 LAB
ANION GAP SERPL CALC-SCNC: 13 MMOL/L (ref 0–18)
BUN BLD-MCNC: 15 MG/DL (ref 9–23)
CALCIUM BLD-MCNC: 9.3 MG/DL (ref 8.7–10.6)
CHLORIDE SERPL-SCNC: 101 MMOL/L (ref 98–112)
CO2 SERPL-SCNC: 28 MMOL/L (ref 21–32)
CREAT BLD-MCNC: 1.53 MG/DL (ref 0.7–1.3)
EGFRCR SERPLBLD CKD-EPI 2021: 44 ML/MIN/1.73M2 (ref 60–?)
FASTING STATUS PATIENT QL REPORTED: YES
GLUCOSE BLD-MCNC: 89 MG/DL (ref 70–99)
OSMOLALITY SERPL CALC.SUM OF ELEC: 294 MOSM/KG (ref 275–295)
POTASSIUM SERPL-SCNC: 4.6 MMOL/L (ref 3.5–5.1)
SODIUM SERPL-SCNC: 142 MMOL/L (ref 136–145)

## 2025-08-15 PROCEDURE — 36415 COLL VENOUS BLD VENIPUNCTURE: CPT

## 2025-08-15 PROCEDURE — 80048 BASIC METABOLIC PNL TOTAL CA: CPT

## 2025-08-15 PROCEDURE — 99214 OFFICE O/P EST MOD 30 MIN: CPT | Performed by: FAMILY MEDICINE

## 2025-08-15 RX ORDER — PANTOPRAZOLE SODIUM 40 MG/1
40 TABLET, DELAYED RELEASE ORAL
Qty: 90 TABLET | Refills: 3 | Status: SHIPPED | OUTPATIENT
Start: 2025-08-15

## 2025-08-27 DIAGNOSIS — G47.00 INSOMNIA, UNSPECIFIED TYPE: ICD-10-CM

## 2025-08-27 RX ORDER — MIRTAZAPINE 30 MG/1
30 TABLET, FILM COATED ORAL NIGHTLY
Qty: 30 TABLET | Refills: 2 | Status: SHIPPED | OUTPATIENT
Start: 2025-08-27

## (undated) DIAGNOSIS — F06.8 ANXIETY DISORDER DUE TO MULTIPLE MEDICAL PROBLEMS: ICD-10-CM

## (undated) DIAGNOSIS — F51.01 PRIMARY INSOMNIA: ICD-10-CM

## (undated) DEVICE — CATH BALLOON CRE 15-18MM 5837

## (undated) DEVICE — SYRINGE/GUAGE ASSEMBLY

## (undated) DEVICE — Device: Brand: DEFENDO AIR/WATER/SUCTION AND BIOPSY VALVE

## (undated) DEVICE — 3M™ RED DOT™ MONITORING ELECTRODE WITH FOAM TAPE AND STICKY GEL, 50/BAG, 20/CASE, 72/PLT 2570: Brand: RED DOT™

## (undated) DEVICE — FORCEP BIOPSY RJ4 LG CAP W/ND

## (undated) DEVICE — FILTERLINE NASAL ADULT O2/CO2

## (undated) DEVICE — 1200CC GUARDIAN II: Brand: GUARDIAN

## (undated) DEVICE — ENDOSCOPY PACK UPPER: Brand: MEDLINE INDUSTRIES, INC.

## (undated) DEVICE — CATH BALLOON CRE 18-20MM 5838

## (undated) NOTE — Clinical Note
FYI: TCM completed. NCM attempted to schedule TCM/HFU, patient declined will call office. TE to PCP office re: appointment. Thank you.

## (undated) NOTE — Clinical Note
March 29, 2017    Reanna Zuleta  [address] [place] [id number]      Dear Merlyn Torres: It was a pleasure speaking with you over the phone recently.  To follow up, I wanted to send you my contact information to utilize when you have a allow us to bill for these services during any month that we have provided at least 20 minutes of non face-to-face care of you and your conditions. You will have a Nurse Care Manager overseeing your care.   Sometimes other staff from our practice will ta ask your Nurse Care Manager for the Chronic Care Management Services Termination form.        Our goal is to provide you with the best care possible, to keep you out of the hospital, and to minimize costs and inconvenience to you due to unnecessary visits t

## (undated) NOTE — MR AVS SNAPSHOT
[address]  [address] [address] [phone]               Thank you for choosing us for your health care visit with Staci Sellers MD.  We are glad to serve you and happy to provide you with this Commonly known as:  LIPITOR           ClonazePAM 2 MG Tabs   Take 1 tablet (2 mg total) by mouth nightly. Commonly known as:  KLONOPIN           cyanocobalamin 1000 MCG/ML Soln   Inject 1 mL (1,000 mcg total) into the muscle every 30 (thirty) days.    Com You can access your MyChart to more actively manage your health care and view more details from this visit by going to [email or web address].   If you've recently had a stay at the Hospital you can access your discharge instructions in [address] by talat

## (undated) NOTE — ED AVS SNAPSHOT
Adeline Lacy   MRN: [id number]    Department:  [facility] Emergency Department   Date of Visit:  7/31/2018           Disclosure     Insurance plans vary and the physician(s) referred by the ER may not be covered by your plan.  Please contact tell this physician (or your personal doctor if your instructions are to return to your personal doctor) about any new or lasting problems. The primary care or specialist physician will see patients referred from the [facility] Emergency Department.  Edwin Dukes

## (undated) NOTE — IP AVS SNAPSHOT
Patient Demographics     Address  [address] [facility] [address] Phone  [phone] (Home)  [phone] (Mobile) *Preferred* E-mail Address  [email or web address]      Patient Contacts     Name Relation Home Work Mobile    Fabienne Zuleta Spouse [phone]    Jono Zuleta Son [phone]      Allergies as of 1/8/2024  Review status set to Review Complete on 1/6/2024       Noted Reaction Type Reactions    Neomycin 05/29/2012   Side Effect SWELLING, PAIN    Polymyxin-bacitracin OINT, local swelling at site, ear swelling    Neomycin 05/29/2012    PAIN, SWELLING    Polymyxin-bacitracin OINT, local swelling at site, ear swelling    Bactrim [sulfamethoxazole W/trimethoprim] 12/08/2014        Dry mouth and stomach cramping    DELETED: Neurontin [gabapentin] 08/28/2012   Side Effect UNKNOWN    Had acute paresthesias of the left arm after 2 doses.    Sulfa Antibiotics 11/12/2017        Versed 09/28/2012        Dizzy     Flonase [fluticasone Propionate] 05/29/2012   Side Effect FATIGUE    SUSP      Code Status Information     Code Status    Full Code        Patient Instructions         RECOMMENDATIONS   Diet Recommendations - Solids: Puree (Pateint prefers puree. He is safe for solids of cookies/pastries.)  Diet Recommendations - Liquids: Thin Liquids  Aspiration Precautions: Upright position  Medication Administration Recommendations: No restrictions    Resume home health care with hospitals Health    [facility]  P: [phone]  F: [phone]       Follow-up Information     Ozzie Alfaro MD. Schedule an appointment as soon as possible for a visit in 1 week(s).    Specialties: Family Medicine, IP Consult to Primary Care  Why: Need to have 4 staples removed. Make follow up appointment.  Contact information:  [address]  [facility] [address] [phone]             Semaj Harley MD. Schedule an appointment as soon as possible for a visit.     Specialty: GASTROENTEROLOGY  Contact information:  [address] [place] [address]  [phone]                        Your Home Meds List      TAKE these medications       Instructions Authorizing Provider Morning Afternoon Evening As Needed   acetaminophen 500 MG Tabs  Commonly known as: Tylenol Extra Strength      Take 1 tablet (500 mg total) by mouth every 6 (six) hours as needed for Pain.          albuterol 108 (90 Base) MCG/ACT Aers  Commonly known as: Ventolin HFA      Inhale 2 puffs into the lungs every 6 (six) hours as needed for Wheezing.          artificial saliva substitute Soln  Next dose due: Tonight, 1/8/2024      Take 10 mL by mouth in the morning, at noon, and at bedtime.          atorvastatin 10 MG Tabs  Commonly known as: Lipitor  Next dose due: Tonight, 1/8/2024      Take 1 tablet (10 mg total) by mouth nightly.   Ozzie Alfaro         cholecalciferol 50 MCG (2000 UT) Caps  Commonly known as: Vitamin D3  Next dose due: Tuesday, 1/9/2024      Take 1 capsule (2,000 Units total) by mouth daily.          cyanocobalamin 1000 MCG/ML Soln  Commonly known as: Vitamin B12  Next dose due: Resume home schedule      Inject 1 mL (1,000 mcg total) into the muscle every 30 (thirty) days.   Ozzie Alfaro         docusate sodium 100 MG Caps  Commonly known as: Colace  Next dose due: Tonight, 1/8/2024      Take 1 capsule (100 mg total) by mouth 2 (two) times daily.          fentaNYL 25 MCG/HR Pt72  Commonly known as: Duragesic  Next dose due: Wednesday, 1/10/2023  Notes to patient: Current patch is on right upper back      Place 1 patch onto the skin every 3 (three) days.          fludrocortisone 0.1 MG Tabs  Commonly known as: Florinef  Next dose due: Tuesday, 1/9/2024      Take 1 tablet (0.1 mg total) by mouth daily.   [place]         Insulin Syringe-Needle U-100 31G X 5/16\" 1 ML Misc  Commonly known as: BD Insulin Syringe Ultrafine      Use for B12 injection   Ozzie Alfaro          levothyroxine 88 MCG Tabs  Commonly known as: Synthroid  Next dose due: Tuesday, 1/9/2024      Take 1 tablet (88 mcg total) by mouth before breakfast.   Ozzie Alfaro         mirtazapine 7.5 MG Tabs  Commonly known as: Remeron  Start taking on: January 12, 2024  Next dose due: Tonight, 1/8/2024      Take 1 tablet (7.5 mg total) by mouth nightly.   Ozzie Alfaro         Naloxone HCl 4 MG/0.1ML Liqd      4 mg by Nasal route as needed. If patient remains unresponsive, repeat dose in other nostril 2-5 minutes after first dose.   Boris Thao         ondansetron 4 mg tablet  Commonly known as: Zofran      Take 1 tablet (4 mg total) by mouth every 8 (eight) hours as needed for Nausea.          pantoprazole 40 MG Tbec  Commonly known as: Protonix  Next dose due: Tuesday, 1/9/2024      Take 1 tablet (40 mg total) by mouth before breakfast.   Ozzie Alfaro         psyllium 28 % Pack  Commonly known as: Metamucil  Next dose due: Tonight, 1/8/2024      Take 1 packet by mouth 2 (two) times daily.   Tamara Stokesuyen         sennosides 8.6 MG Tabs  Commonly known as: Senokot      Take 2 tablets (17.2 mg total) by mouth daily as needed (constipation).                Where to Get Your Medications      These medications were sent to [facility]/pharm[facility] #[id number] - [place] [id number] [facility] [address], [phone], [phone]  [address] [place] [address] [address], [facility] [address]    Phone: [phone]   fludrocortisone 0.1 MG Tabs           [id number] - MAR ACTION REPORT  (last 48 hrs)    ** SITE UNKNOWN **     Order ID Medication Name Action Time Action Reason Comments    [id number] acetaminophen (Tylenol Extra Strength) tab 1,000 mg 01/06/24 2233 Given      [id number] acetaminophen (Tylenol Extra Strength) tab 1,000 mg 01/07/24 2144 Given      [id number] acetaminophen (Tylenol Extra Strength) tab 1,000 mg 01/08/24 0607 Given      [id number] acetaminophen (Tylenol Extra Strength) tab 1,000 mg 01/08/24 1307 Given       [id number] atorvastatin (Lipitor) tab 10 mg 01/06/24 2233 Given      [id number] atorvastatin (Lipitor) tab 10 mg 01/07/24 2045 Given      [id number] benzocaine-menthol (Cepacol) lozenge 1 lozenge 01/08/24 0316 Given      [id number] fludrocortisone (Florinef) tab 0.1 mg 01/07/24 1620 Given      [id number] fludrocortisone (Florinef) tab 0.1 mg 01/08/24 0904 Given      [id number] iopamidol 76% (ISOVUE-370) injection for power injector 01/06/24 1708 Given      [id number] lactated ringers IV bolus 1,000 mL 01/08/24 0920 New Bag      [id number] lactated ringers infusion 01/06/24 2239 New Bag      [id number] lactated ringers infusion 01/07/24 0746 New Bag      [id number] lactated ringers infusion 01/07/24 1716 New Bag      [id number] lactated ringers infusion 01/08/24 0316 New Bag      [id number] levothyroxine (Synthroid) tab 88 mcg 01/07/24 0659 Given      [id number] levothyroxine (Synthroid) tab 88 mcg 01/08/24 0720 Given      [id number] pantoprazole (Protonix) DR tab 40 mg 01/07/24 0619 Given      [id number] pantoprazole (Protonix) DR tab 40 mg 01/08/24 0608 Given      [id number] sodium chloride 0.9% infusion 1,000 mL 01/06/24 1812 New Bag            LEFT LOWER ABDOMEN     Order ID Medication Name Action Time Action Reason Comments    [id number] enoxaparin (Lovenox) 40 MG/0.4ML SUBQ injection 40 mg 01/07/24 0851 Given      [id number] enoxaparin (Lovenox) 40 MG/0.4ML SUBQ injection 40 mg 01/08/24 0854 Given            RIGHT UPPER BACK     Order ID Medication Name Action Time Action Reason Comments    [id number] fentaNYL (Duragesic) 25 MCG/HR patch 1 patch 01/07/24 1555 Fentanyl Patch Applied  New patch applied after MRI            Recent Vital Signs    Flowsheet Row Most Recent Value   /82 Filed at 01/08/2024 1116   Pulse 81 Filed at 01/08/2024 1116   Resp 16 Filed at 01/08/2024 1116   Temp 98.2 °F (36.8 °C) Filed at 01/08/2024 1116   SpO2 94 % Filed at 01/08/2024 1116      Patient's Most Recent Weight    Flowsheet Row Most Recent  Value   Patient Weight 51.4 kg (113 lb 4.8 oz)      Lab Results Last 24 Hours    No matching results found     Microbiology Results (All)     Procedure Component Value Units Date/Time    SARS-CoV-2/Flu A and B/RSV by PCR (GeneXpert) [[id number]]  (Normal) Collected: 24 1134    Order Status: Completed Lab Status: Final result Updated: 24 1216    Specimen: Other from Nares      SARS-CoV-2 (COVID-19) - (GeneXpert) Not Detected     Influenza A by PCR Negative     Influenza B by PCR Negative     RSV by PCR Negative    Narrative:      This test is intended for the qualitative detection and differentiation of SARS-CoV-2, influenza A, influenza B, and respiratory syncytial virus (RSV) viral RNA in nasopharyngeal or nares swabs from individuals suspected of respiratory viral infection consistent with COVID-19 by their healthcare provider. Signs and symptoms of respiratory viral infection due to SARS-CoV-2, influenza, and RSV can be similar.    Test performed using the Xpert Xpress SARS-CoV-2/FLU/RSV (real time RT-PCR)  assay on the [employer]pert instrument, [employer], [place] [address].   This test is being used under the Food and Drug Administration's Emergency Use Authorization.    The authorized Fact Sheet for Healthcare Providers for this assay is available upon request from the laboratory.         H&P - H&P Note      H&P signed by Dno Dunlap MD at 2024  8:39 PM  Version 1 of 1    Author: Don Dunlap MD Service: — Author Type: Physician    Filed: 2024  8:39 PM Date of Service: 2024  7:19 PM Status: Signed    : Don Dunlap MD (Physician)         [facility]  [facility] and [facility]     Ozzie WHATLEY Gangaarunmike Patient Status:  Emergency    3/8/1940 MRN [id number]   [facility] EMERGENCY DEPARTMENT Attending Ji Haro MD   Hosp Day # 0 PCP Ozzie Alfaro MD     Chief Complaint: Syncope    History of Present Illness: Ozzie Zuleta is a  83 year old male with PMHx GERD, HTN, HLD, recurrent falls, chronic benzo use, hx esophageal stricture s/p balloon dilation, Hypothyroidism who presents for syncope.     Patient with history of recurrent syncopal episodes, multiple admissions for same, with cardiac workup negative.  Notes that today he was at home, was walking with walker when he started feeling lightheaded and dizzy and passed out.  Hit his head on the linoleum floor, states that he regained consciousness in approximately 30 seconds to an minute per his wife.  Was back to baseline mentation after waking.  Denies associated chest pain, pressure, abdominal pain, nausea, vomiting.  Does note that he has a history of orthostatic hypotension and gets lightheaded when standing.  Notes that uses a walker and attempt to alleviate this however the longer he stands the more lightheaded he gets and notes that if he was standing for more than 1015 minutes he is likely to pass out.  Has not tried any medications for orthostasis or compression stockings.    Past Medical History:  Past Medical History:   Diagnosis Date    Abdominal distention     Abdominal pain     Acute encephalopathy     Anemia     Back pain     Back problem     back and neck pain    Black stools     Bladder incontinence     Bloating     Community acquired pneumonia of right middle lobe of lung     Constipation     Diarrhea, unspecified     Disorder of prostate     Dizziness     Esophageal reflux     Exposure to TB     Fatigue     Flatulence/gas pain/belching     Frequent use of laxatives     Hearing loss     Heart palpitations     Hemorrhoids     High blood pressure     High cholesterol     Hoarseness, chronic     Indigestion 03/01/2018    Irregular bowel habits     Leaking of urine     Loss of appetite     Other and unspecified hyperlipidemia     PAC (premature atrial contraction) 01/22/2015    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    Problems with swallowing     Sleep  disturbance     Stool incontinence     Unspecified disorder of thyroid     Unspecified essential hypertension     Unspecified gastritis and gastroduodenitis without mention of hemorrhage     Unspecified hypothyroidism     Weight loss         Past Surgical History:   Past Surgical History:   Procedure Laterality Date    BACK SURGERY  5/22/14    C4-C7 ACDF     CATARACT      COLONOSCOPY  7/5/11    COLONOSCOPY      FLUOR GID & LOCLZJ NDL/CATH SPI DX/THER NJX  9/28/2012    Procedure: TRANSFORAMINAL EPIDURAL - LUMBAR;  Surgeon: Balbir Parks MD;  Location: [facility]    FLUOR GID & LOCLZJ NDL/CATH SPI DX/THER NJX  4/28/2014    Procedure: INTRATHECAL PUMP TRIAL;  Surgeon: Balbir Parks MD;  Location: [facility]    FLUOR GID & LOCLZJ NDL/CATH SPI DX/THER NJX N/A 1/15/2015    Procedure: INTRATHECAL PUMP TRIAL;  Surgeon: Balbir Parks MD;  Location: [facility]    HERNIA SURGERY  2014    INJECTION, ANESTHETIC/STEROID, TRANSFORAMINAL EPIDURAL; LUMBAR/SACRAL, SINGLE LEVEL  9/7/2012    Procedure: TRANSFORAMINAL EPIDURAL - LUMBAR;  Surgeon: Andre Taylor MD;  Location: [facility]    INJECTION, ANESTHETIC/STEROID, TRANSFORAMINAL EPIDURAL; LUMBAR/SACRAL, SINGLE LEVEL  4/2/2013    Procedure: TRANSFORAMINAL EPIDURAL - LUMBAR;  Surgeon: Balbir Parks MD;  Location: [facility]    INJECTION, W/WO CONTRAST, DX/THERAPEUTIC SUBSTANCE, EPIDURAL/SUBARACHNOID; LUMBAR/SACRAL  9/28/2012    Procedure: TRANSFORAMINAL EPIDURAL - LUMBAR;  Surgeon: Balbir Parks MD;  Location: [facility]    INJECTION, W/WO CONTRAST, DX/THERAPEUTIC SUBSTANCE, EPIDURAL/SUBARACHNOID; LUMBAR/SACRAL  4/28/2014    Procedure: INTRATHECAL PUMP TRIAL;  Surgeon: Balbir Parks MD;  Location: [facility]    INJECTION, W/WO CONTRAST, DX/THERAPEUTIC SUBSTANCE, EPIDURAL/SUBARACHNOID; LUMBAR/SACRAL N/A 1/15/2015    Procedure:  INTRATHECAL PUMP TRIAL;  Surgeon: Balbir Parks MD;  Location: [facility]    LASER SURGERY OF EYE  6/09    cataract both eyes 2 weeks apart    M-SEDAJ BY  PHYS PERFRMG SVC 5+ YR  9/7/2012    Procedure: TRANSFORAMINAL EPIDURAL - LUMBAR;  Surgeon: Andre Taylor MD;  Location: [facility]    M-SEDAJ BY  PHYS PERFRMG SVC 5+ YR  12/18/2013    Procedure: STIMULATOR TRIAL EPIDURAL LEAD;  Surgeon: Balbir Parks MD;  Location: [facility]    M-SEDAJ BY  PHYS PERFRMG SVC 5+ YR  2/3/2014    Procedure: STIMULATOR TRIAL PERIPHERAL;  Surgeon: Balbir Parks MD;  Location: [facility]    M-SEDAJ BY  PHYS PERFRMG SVC 5+ YR  4/28/2014    Procedure: INTRATHECAL PUMP TRIAL;  Surgeon: Balbir Parks MD;  Location: [facility]    NEEDLE BIOPSY LIVER      ORTHOPEDIC SURG (PBP)  5/2013 8/2013    Lumbar 4 - 5     OTHER  08    esophagogastroduodenoscopy    OTHER      jaw fracture/repair, hardware remains    PATIENT DOCUMENTED NOT TO HAVE EXPERIENCED ANY OF THE FOLLOWING EVENTS  4/2/2013    Procedure: TRANSFORAMINAL EPIDURAL - LUMBAR;  Surgeon: Balbir Parks MD;  Location: [facility]    PATIENT DOCUMENTED NOT TO HAVE EXPERIENCED ANY OF THE FOLLOWING EVENTS  12/18/2013    Procedure: STIMULATOR TRIAL EPIDURAL LEAD;  Surgeon: Balbir Parks MD;  Location: [facility]    PATIENT DOCUMENTED NOT TO HAVE EXPERIENCED ANY OF THE FOLLOWING EVENTS  2/3/2014    Procedure: STIMULATOR TRIAL PERIPHERAL;  Surgeon: Balbir Parks MD;  Location: [facility]    PATIENT DOCUMENTED NOT TO HAVE EXPERIENCED ANY OF THE FOLLOWING EVENTS  4/28/2014    Procedure: INTRATHECAL PUMP TRIAL;  Surgeon: Balbir Parks MD;  Location: [facility]    PATIENT DOCUMENTED NOT TO HAVE EXPERIENCED ANY OF THE FOLLOWING EVENTS N/A 1/15/2015    Procedure: INTRATHECAL PUMP TRIAL;  Surgeon:  Balbir Parks MD;  Location: [facility]    PATIENT WITH PREOPERATIVE ORDER FOR IV ANTIBIOTIC SURGICAL SITE INFECT  2/3/2014    Procedure: STIMULATOR TRIAL PERIPHERAL;  Surgeon: Balbir Parks MD;  Location: [facility]    PATIENT WITHOUGH PREOPERATIVE ORDER FOR IV ANTIBIOTIC SURGICAL SITE INFECTION PROPHYLAXIS.  4/2/2013    Procedure: TRANSFORAMINAL EPIDURAL - LUMBAR;  Surgeon: Balbir Parks MD;  Location: [facility]    PATIENT WITHOUGH PREOPERATIVE ORDER FOR IV ANTIBIOTIC SURGICAL SITE INFECTION PROPHYLAXIS.  12/18/2013    Procedure: STIMULATOR TRIAL EPIDURAL LEAD;  Surgeon: Balbir Parks MD;  Location: [facility]    PATIENT WITHOUGH PREOPERATIVE ORDER FOR IV ANTIBIOTIC SURGICAL SITE INFECTION PROPHYLAXIS.  4/28/2014    Procedure: INTRATHECAL PUMP TRIAL;  Surgeon: Balbir Parks MD;  Location: [facility]    PATIENT WITHOUGH PREOPERATIVE ORDER FOR IV ANTIBIOTIC SURGICAL SITE INFECTION PROPHYLAXIS. N/A 1/15/2015    Procedure: INTRATHECAL PUMP TRIAL;  Surgeon: Balbir Parks MD;  Location: [facility]    PERCUT IMPLNT NEUROELEC,PERIPH  2/3/2014    Procedure: STIMULATOR TRIAL PERIPHERAL;  Surgeon: Balbir Parks MD;  Location: [facility]    PERCUT IMPLNT NEUROELEC,PERIP  2/3/2014    Procedure: STIMULATOR TRIAL PERIPHERAL;  Surgeon: Balbir Parks MD;  Location: [facility]    PERCUT IMPLNT NEUROELEC,PERIPH  2/3/2014    Procedure: STIMULATOR TRIAL PERIPHERAL;  Surgeon: Balbir Parks MD;  Location: [facility]    PERCUT IMPLNT NEUROELEC,PERIPH  2/3/2014    Procedure: STIMULATOR TRIAL PERIPHERAL;  Surgeon: Balbir Parks MD;  Location: [facility]    PERCUT IMPLNT NEUROELECT,EPIDURAL  12/18/2013    Procedure: STIMULATOR TRIAL EPIDURAL LEAD;  Surgeon: Balbir Parks MD;  Location: [facility]     PERCUT IMPLNT NEUROELECT,EPIDURAL  12/18/2013    Procedure: STIMULATOR TRIAL EPIDURAL LEAD;  Surgeon: Balbir Parks MD;  [place] [facility]    SIGMOIDOSCOPY,DIAGNOSTIC      SPINE SURGERY PROCEDURE UNLISTED      CERVICAL FUSION X3/HARDWARE, lumbar 4-5 fusion     TONSILLECTOMY      UPPER GI ENDOSCOPY,EXAM         Social History:  reports that he has never smoked. He has never used smokeless tobacco. He reports that he does not drink alcohol and does not use drugs.    Family History:   Family History   Problem Relation Age of Onset    Hypertension Father     Heart Disorder Father     Hypertension Mother     Heart Disorder Mother        Allergies:   Allergies   Allergen Reactions    Neomycin SWELLING and PAIN     Polymyxin-bacitracin OINT, local swelling at site, ear swelling    Neomycin PAIN and SWELLING     Polymyxin-bacitracin OINT, local swelling at site, ear swelling    Bactrim [Sulfamethoxazole W/Trimethoprim]      Dry mouth and stomach cramping    Sulfa Antibiotics     Versed      Dizzy     Flonase [Fluticasone Propionate] FATIGUE     SUSP       Medications:    Current Facility-Administered Medications on File Prior to Encounter   Medication Dose Route Frequency Provider Last Rate Last Admin    [COMPLETED] OLANZapine (Zyprexa) 10 mg in sterile water for injection (PF) IM injection  10 mg Intramuscular Once Doris Olvera MD   10 mg at 12/11/23 1942    [COMPLETED] LORazepam (Ativan) 2 mg/mL injection 1 mg  1 mg Intravenous Once Doris Olvera MD   1 mg at 12/11/23 1954    [COMPLETED] haloperidol lactate (Haldol) 5 MG/ML injection 5 mg  5 mg Intravenous Once Doris Olvera MD   5 mg at 12/11/23 1955     Current Outpatient Medications on File Prior to Encounter   Medication Sig Dispense Refill    levothyroxine 88 MCG Oral Tab Take 1 tablet (88 mcg total) by mouth before breakfast. 90 tablet 0    levothyroxine 100 MCG Oral Tab Take 1 tablet (100 mcg total) by mouth daily. 90 tablet 3    [START ON  1/12/2024] mirtazapine 7.5 MG Oral Tab Take 1 tablet (7.5 mg total) by mouth nightly. 30 tablet 2    pantoprazole 40 MG Oral Tab EC Take 1 tablet (40 mg total) by mouth before breakfast. 90 tablet 3    atorvastatin 10 MG Oral Tab Take 1 tablet (10 mg total) by mouth nightly. 30 tablet 0    Insulin Syringe-Needle U-100 (BD INSULIN SYRINGE ULTRAFINE) 31G X 5/16\" 1 ML Does not apply Misc Use for B12 injection 10 each 2    docusate sodium 100 MG Oral Cap Take 1 capsule (100 mg total) by mouth 2 (two) times daily.      sennosides 8.6 MG Oral Tab Take 2 tablets (17.2 mg total) by mouth daily as needed (constipation).      artificial saliva substitute Mouth/Throat Solution Take 10 mL by mouth in the morning, at noon, and at bedtime.      ondansetron (ZOFRAN) 4 mg tablet Take 1 tablet (4 mg total) by mouth every 8 (eight) hours as needed for Nausea.      albuterol 108 (90 Base) MCG/ACT Inhalation Aero Soln Inhale 2 puffs into the lungs every 6 (six) hours as needed for Wheezing.      fentaNYL 25 MCG/HR Transdermal Patch 72 Hr Place 1 patch onto the skin every 3 (three) days.      cyanocobalamin 1000 MCG/ML Injection Solution Inject 1 mL (1,000 mcg total) into the muscle every 30 (thirty) days. 3 mL 9    Naloxone HCl 4 MG/0.1ML Nasal Liquid 4 mg by Nasal route as needed. If patient remains unresponsive, repeat dose in other nostril 2-5 minutes after first dose. 1 kit 0    acetaminophen 500 MG Oral Tab Take 1 tablet (500 mg total) by mouth every 6 (six) hours as needed for Pain.      psyllium 28 % Oral Powd Pack Take 1 packet by mouth 2 (two) times daily. 15 packet 0    Melatonin 10 MG Oral Cap Take 6 mg by mouth nightly as needed.      Vitamin D3 (VITAMIN D3) 2000 UNITS Oral Cap Take 1 capsule (2,000 Units total) by mouth daily.         Review of Systems:   A comprehensive 14 point review of systems was completed.    Pertinent positives and negatives noted in the HPI.    Physical Exam:    BP (!) 141/92   Pulse 85   Temp  98.2 °F (36.8 °C) (Temporal)   Resp 20   SpO2 98%   General: No acute distress. Alert and oriented x 3.  HEENT: Normocephalic atraumatic. Moist mucous membranes. EOM-I. PERRLA. Anicteric.  Neck: No lymphadenopathy. No JVD. No carotid bruits.  Respiratory: Clear to auscultation bilaterally. No wheezes. No rhonchi.  Cardiovascular: S1, S2. Regular rate and rhythm. No murmurs, rubs or gallops. Equal pulses.   Chest and Back: No tenderness or deformity.  Abdomen: Soft, nontender, nondistended.  Positive bowel sounds. No rebound, guarding or organomegaly.  Neurologic: No focal neurological deficits. CNII-XII grossly intact.  Musculoskeletal: Moves all extremities.  Extremities: No edema or cyanosis.  Integument: No rashes or lesions.   Psychiatric: Appropriate mood and affect.    Diagnostic Data:      Labs:  Recent Labs   Lab 01/06/24  1647 01/06/24  1649   WBC 4.4  --    HGB 12.3*  --    MCV 92.4  --    .0  --    INR  --  1.77*       Recent Labs   Lab 01/06/24  1647   *   BUN 11   CREATSERUM 1.05   CA 8.2*   ALB 3.1*   *   K 4.3      CO2 31.0   ALKPHO 63   AST 10*   ALT 16   BILT 0.9   TP 6.0*       Estimated Creatinine Clearance: 38.6 mL/min (based on SCr of 1.05 mg/dL).    Recent Labs   Lab 01/06/24  1649   PTP 20.8*   INR 1.77*       No results for input(s): \"TROP\", \"CK\" in the last 168 hours.    Imaging: Imaging data reviewed in Epic.  CT ABDOMEN PELVIS IV CONTRAST, NO ORAL (ER)    Result Date: 1/6/2024  CONCLUSION:  1. The common duct is dilated measuring up to 1.3 cm.  No definite obstructing lesions are identified on CT.  This may be further evaluated with ERCP if indicated. 2. Lumbar fusion with findings concerning for hardware failure around the left L5 pedicle screw.   LOCATION:  [facility]   Dictated by (CST): Germán Granados MD on 1/06/2024 at 6:17 PM     Finalized by (CST): Germán Granados MD on 1/06/2024 at 6:28 PM       XR ELBOW, COMPLETE (MIN 3 VIEWS), LEFT (CPT=73310)    Result Date:  1/6/2024  CONCLUSION:  No acute abnormality in the left elbow.   LOCATION:  [facility]    Dictated by (CST): Germán Granados MD on 1/06/2024 at 6:01 PM     Finalized by (CST): Germán Granados MD on 1/06/2024 at 6:01 PM       CT BRAIN OR HEAD (81252)    Result Date: 1/6/2024  CONCLUSION:  No acute intracranial abnormality.  Mild left parietal scalp soft tissue swelling    LOCATION:  [facility]   Dictated by (CST): Germán Granados MD on 1/06/2024 at 5:58 PM     Finalized by (CST): Germán Granados MD on 1/06/2024 at 6:01 PM       XR CHEST AP PORTABLE  (CPT=71045)    Result Date: 1/6/2024  CONCLUSION:  Mild patchy interstitial opacity in the lungs may represent infiltrate.  Correlate clinically.   LOCATION:  [facility]      Dictated by (CST): Germán Granados MD on 1/06/2024 at 5:57 PM     Finalized by (CST): Germán Granados MD on 1/06/2024 at 5:57 PM       CT SPINE CERVICAL (CPT=72125)    Result Date: 1/6/2024  CONCLUSION:  1. No acute abnormality in the cervical spine.  2. Degenerative changes in the cervical spine as above.    LOCATION:  [facility]   Dictated by (CST): Germán Granados MD on 1/06/2024 at 5:48 PM     Finalized by (CST): Germán Granados MD on 1/06/2024 at 5:56 PM          ASSESSMENT / PLAN:     # Recurrent fall/syncope  # Severe orthostatic hypotension   - Pt with multiple admissions for falls and syncope, thought orthostatic vs. D/t polypharmacy   - Notes that since recent admission has not been taking Klonopin,   - CTH, C-spine without acute pathology noted   - Orthostatics ordered   - Will start Fludrocortisone, compression stockings for empiric treatment of orthostatsis   - EKG non-ischemic, similar to prior, Trop negative   - IVF   - TTE from 11/20/2023 reviewed, no acute abnormalities noted    - PT/OT/SW    # Hx Esophageal stricture s/p balloon dilation    - We contributing to patient's orthostasis as well given poor p.o. intake at home, cachectic and thin   - Notes that he has seen GI in the past and has had dilatation however  symptoms reoccurred and has not followed up with GI   - Recommend repeat GI follow-up as outpatient    # CBD dilation   - LFTs WNL   - MRCP ordered    # suspected lumbar fusion hardware failure?    - NSYG consulted     # Chronic pain   - continue home fentanyl patch    # Hyponatremia - IVF, trend  # Hyperlipidemia - continue home statin   # GERD - continue PPI  # Hypothyroidism - continue home levothyroxine       Code Status: Full Code    Plan of care discussed with patient, ED physician    Don Dunlap MD  1/6/2024      Supplementary Documentation:      MDM : Patient's ER labs, imaging reviewed.  A.m. labs ordered.  ER management discussed with ED physician, decision made for patient to be admitted to the hospital for further medical management.              Electronically signed by Don Dunlap MD on 1/6/2024  8:39 PM              Consults - MD Consult Notes      Consults signed by Jacek Mancia MD at 1/7/2024  2:26 PM     Author: Jacek Mancia MD Service: Neurosurgery Author Type: Physician    Filed: 1/7/2024  2:26 PM Date of Service: 1/7/2024  2:17 PM Status: Addendum    : Jacek Mancia MD (Physician)    Related Notes: Original Note by Jacek Mancia MD (Physician) filed at 1/7/2024  2:24 PM     Consult Orders    1. Consult to Neurosurgery [[id number]] ordered by Don Dunlap MD at 01/06/24 1938             [facility]  Neurological Surgery Consult Note    Ozzie Zuleta  3/8/1940  [id number]  PCP: Ozzie Alfaro MD  Consulting Provider: Don Dunlap MD    REASON FOR CONSULT:  Concern for lumbar hardware failure    HISTORY OF PRESENT ILLNESS:  Ozzie Zuleta is a(n) 83 year old male with hypertension, hyperlipidemia, GERD, recurrent falls, chronic benzo use, history of esophageal stricture status post balloon dilation, hypothyroidism who presents status post syncopal fall.  His fall occurred yesterday.  He has had multiple  admissions for the same, with negative cardiac workups.  Yesterday he was at home walking with a walker when he started feeling lightheaded and dizzy and passed out.  He hit his head on the linoleum floor and regain consciousness of about a minute after per report.  He has had neck and lower back pain since the fall.  Trauma imaging was obtained, including CT brain, cervical which were negative.  CT abdomen pelvis with contrast was interpreted as concerning for hardware failure.  He has a history of an L4-5 TLIF performed an outside hospital over a decade ago.  Neurosurgery consulted for this finding.    PAST MEDICAL HISTORY:  Past Medical History:   Diagnosis Date    Abdominal distention     Abdominal pain     Acute encephalopathy     Anemia     Back pain     Back problem     back and neck pain    Black stools     Bladder incontinence     Bloating     Community acquired pneumonia of right middle lobe of lung     Constipation     Diarrhea, unspecified     Disorder of prostate     Dizziness     Esophageal reflux     Exposure to TB     Fatigue     Flatulence/gas pain/belching     Frequent use of laxatives     Hearing loss     Heart palpitations     Hemorrhoids     High blood pressure     High cholesterol     Hoarseness, chronic     Incontinence     Indigestion 03/01/2018    Irregular bowel habits     Leaking of urine     Loss of appetite     Other and unspecified hyperlipidemia     PAC (premature atrial contraction) 01/22/2015    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    Problems with swallowing     Sleep disturbance     Stool incontinence     Unspecified disorder of thyroid     Unspecified essential hypertension     Unspecified gastritis and gastroduodenitis without mention of hemorrhage     Unspecified hypothyroidism     Weight loss      PAST SURGICAL HISTORY:  Past Surgical History:   Procedure Laterality Date    BACK SURGERY  5/22/14    C4-C7 ACDF     CATARACT      COLONOSCOPY  7/5/11     COLONOSCOPY      FLUOR GID & LOCLZJ NDL/CATH SPI DX/THER NJX  9/28/2012    Procedure: TRANSFORAMINAL EPIDURAL - LUMBAR;  Surgeon: Balbir Parks MD;  [place] [facility]    FLUOR GID & LOCLZJ NDL/CATH SPI DX/THER NJX  4/28/2014    Procedure: INTRATHECAL PUMP TRIAL;  Surgeon: Balbir Parks MD;  [place] [facility]    FLUOR GID & LOCLZJ NDL/CATH SPI DX/THER NJX N/A 1/15/2015    Procedure: INTRATHECAL PUMP TRIAL;  Surgeon: Balbir Parks MD;  [place] [facility]    HERNIA SURGERY  2014    INJECTION, ANESTHETIC/STEROID, TRANSFORAMINAL EPIDURAL; LUMBAR/SACRAL, SINGLE LEVEL  9/7/2012    Procedure: TRANSFORAMINAL EPIDURAL - LUMBAR;  Surgeon: Andre Taylor MD;  Location: [facility]    INJECTION, ANESTHETIC/STEROID, TRANSFORAMINAL EPIDURAL; LUMBAR/SACRAL, SINGLE LEVEL  4/2/2013    Procedure: TRANSFORAMINAL EPIDURAL - LUMBAR;  Surgeon: Balbir Parks MD;  Location: [facility]    INJECTION, W/WO CONTRAST, DX/THERAPEUTIC SUBSTANCE, EPIDURAL/SUBARACHNOID; LUMBAR/SACRAL  9/28/2012    Procedure: TRANSFORAMINAL EPIDURAL - LUMBAR;  Surgeon: Balbir Parks MD;  Location: [facility]    INJECTION, W/WO CONTRAST, DX/THERAPEUTIC SUBSTANCE, EPIDURAL/SUBARACHNOID; LUMBAR/SACRAL  4/28/2014    Procedure: INTRATHECAL PUMP TRIAL;  Surgeon: Balbir Parks MD;  Location: [facility]    INJECTION, W/WO CONTRAST, DX/THERAPEUTIC SUBSTANCE, EPIDURAL/SUBARACHNOID; LUMBAR/SACRAL N/A 1/15/2015    Procedure: INTRATHECAL PUMP TRIAL;  Surgeon: Balbir Parks MD;  Location: [facility]    LASER SURGERY OF EYE  6/09    cataract both eyes 2 weeks apart    M-SEDAJ BY [facility] 5+ YR  9/7/2012    Procedure: TRANSFORAMINAL EPIDURAL - LUMBAR;  Surgeon: Andre Taylor MD;  [place] [facility]    M-SEDAJ BY [facility] PERFRMG SV 5+ YR  12/18/2013    Procedure:  STIMULATOR TRIAL EPIDURAL LEAD;  Surgeon: Balbir Parks MD;  [place]: [facility]    M-SEDAJ BY  PHYS PERFRMG SVC 5+ YR  2/3/2014    Procedure: STIMULATOR TRIAL PERIPHERAL;  Surgeon: Balbir Parks MD;  Location: [facility]    M-SEDAJ BY  PHYS PERFRMG SVC 5+ YR  4/28/2014    Procedure: INTRATHECAL PUMP TRIAL;  Surgeon: Balbir Parks MD;  Location: [facility]    NEEDLE BIOPSY LIVER      ORTHOPEDIC SURG (PBP)  5/2013 8/2013    Lumbar 4 - 5     OTHER  08    esophagogastroduodenoscopy    OTHER      jaw fracture/repair, hardware remains    PATIENT DOCUMENTED NOT TO HAVE EXPERIENCED ANY OF THE FOLLOWING EVENTS  4/2/2013    Procedure: TRANSFORAMINAL EPIDURAL - LUMBAR;  Surgeon: Balbir Parks MD;  Location: [facility]    PATIENT DOCUMENTED NOT TO HAVE EXPERIENCED ANY OF THE FOLLOWING EVENTS  12/18/2013    Procedure: STIMULATOR TRIAL EPIDURAL LEAD;  Surgeon: Balbir Parks MD;  Location: [facility]    PATIENT DOCUMENTED NOT TO HAVE EXPERIENCED ANY OF THE FOLLOWING EVENTS  2/3/2014    Procedure: STIMULATOR TRIAL PERIPHERAL;  Surgeon: Balbir Parks MD;  Location: [facility]    PATIENT DOCUMENTED NOT TO HAVE EXPERIENCED ANY OF THE FOLLOWING EVENTS  4/28/2014    Procedure: INTRATHECAL PUMP TRIAL;  Surgeon: Balbir Parks MD;  Location: [facility]    PATIENT DOCUMENTED NOT TO HAVE EXPERIENCED ANY OF THE FOLLOWING EVENTS N/A 1/15/2015    Procedure: INTRATHECAL PUMP TRIAL;  Surgeon: Balbir Parks MD;  Location: [facility]    PATIENT WITH PREOPERATIVE ORDER FOR IV ANTIBIOTIC SURGICAL SITE INFECT  2/3/2014    Procedure: STIMULATOR TRIAL PERIPHERAL;  Surgeon: Balbir Parks MD;  Location: [facility]    PATIENT WITHOUGH PREOPERATIVE ORDER FOR IV ANTIBIOTIC SURGICAL SITE INFECTION PROPHYLAXIS.  4/2/2013    Procedure: TRANSFORAMINAL EPIDURAL -  LUMBAR;  Surgeon: Balbir Parks MD;  [place] [facility]    PATIENT WITHOUGH PREOPERATIVE ORDER FOR IV ANTIBIOTIC SURGICAL SITE INFECTION PROPHYLAXIS.  12/18/2013    Procedure: STIMULATOR TRIAL EPIDURAL LEAD;  Surgeon: Balbir Parks MD;  Location: [facility]    PATIENT WITHOUGH PREOPERATIVE ORDER FOR IV ANTIBIOTIC SURGICAL SITE INFECTION PROPHYLAXIS.  4/28/2014    Procedure: INTRATHECAL PUMP TRIAL;  Surgeon: Balbir Parks MD;  Location: [facility]    PATIENT WITHOUGH PREOPERATIVE ORDER FOR IV ANTIBIOTIC SURGICAL SITE INFECTION PROPHYLAXIS. N/A 1/15/2015    Procedure: INTRATHECAL PUMP TRIAL;  Surgeon: Balbir Parks MD;  Location: [facility]    PERCUT IMPLNT NEUROELEC,[facility]  2/3/2014    Procedure: STIMULATOR TRIAL PERIPHERAL;  Surgeon: Balbir Parks MD;  Location: [facility]    PERCUT IMPLNT NEUROELEC,[facility]  2/3/2014    Procedure: STIMULATOR TRIAL PERIPHERAL;  Surgeon: Balbir Parks MD;  Location: [facility]    PERCUT IMPLNT NEUROELEC,[facility]  2/3/2014    Procedure: STIMULATOR TRIAL PERIPHERAL;  Surgeon: Balbir Parks MD;  Location: [facility]    PERCUT IMPLNT NEUROELEC,[facility]  2/3/2014    Procedure: STIMULATOR TRIAL PERIPHERAL;  Surgeon: Balbir Parks MD;  Location: [facility]    PERCUT IMPLNT NEUROELECT,EPIDURAL  12/18/2013    Procedure: STIMULATOR TRIAL EPIDURAL LEAD;  Surgeon: Balbir Parks MD;  Location: [facility]    PERCUT IMPLNT NEUROELECT,EPIDURAL  12/18/2013    Procedure: STIMULATOR TRIAL EPIDURAL LEAD;  Surgeon: Balbir Parks MD;  Location: [facility]    SIGMOIDOSCOPY,DIAGNOSTIC      SPINE SURGERY PROCEDURE UNLISTED      CERVICAL FUSION X3/HARDWARE, lumbar 4-5 fusion     TONSILLECTOMY      UPPER GI ENDOSCOPY,EXAM       FAMILY HISTORY:  family history includes Heart Disorder in his father  and mother; Hypertension in his father and mother.    SOCIAL HISTORY:   reports that he has never smoked. He has never used smokeless tobacco. He reports that he does not drink alcohol and does not use drugs.    ALLERGIES:  Allergies   Allergen Reactions    Neomycin SWELLING and PAIN     Polymyxin-bacitracin OINT, local swelling at site, ear swelling    Neomycin PAIN and SWELLING     Polymyxin-bacitracin OINT, local swelling at site, ear swelling    Bactrim [Sulfamethoxazole W/Trimethoprim]      Dry mouth and stomach cramping    Sulfa Antibiotics     Versed      Dizzy     Flonase [Fluticasone Propionate] FATIGUE     SUSP       MEDICATIONS:  No current facility-administered medications on file prior to encounter.     Current Outpatient Medications on File Prior to Encounter   Medication Sig Dispense Refill    levothyroxine 88 MCG Oral Tab Take 1 tablet (88 mcg total) by mouth before breakfast. 90 tablet 0    [START ON 1/12/2024] mirtazapine 7.5 MG Oral Tab Take 1 tablet (7.5 mg total) by mouth nightly. 30 tablet 2    pantoprazole 40 MG Oral Tab EC Take 1 tablet (40 mg total) by mouth before breakfast. 90 tablet 3    atorvastatin 10 MG Oral Tab Take 1 tablet (10 mg total) by mouth nightly. 30 tablet 0    fentaNYL 25 MCG/HR Transdermal Patch 72 Hr Place 1 patch onto the skin every 3 (three) days.      levothyroxine 100 MCG Oral Tab Take 1 tablet (100 mcg total) by mouth daily. (Patient not taking: Reported on 1/6/2024) 90 tablet 3    Insulin Syringe-Needle U-100 (BD INSULIN SYRINGE ULTRAFINE) 31G X 5/16\" 1 ML Does not apply Misc Use for B12 injection 10 each 2    docusate sodium 100 MG Oral Cap Take 1 capsule (100 mg total) by mouth 2 (two) times daily.      sennosides 8.6 MG Oral Tab Take 2 tablets (17.2 mg total) by mouth daily as needed (constipation).      artificial saliva substitute Mouth/Throat Solution Take 10 mL by mouth in the morning, at noon, and at bedtime.      ondansetron (ZOFRAN) 4 mg tablet Take 1  tablet (4 mg total) by mouth every 8 (eight) hours as needed for Nausea.      albuterol 108 (90 Base) MCG/ACT Inhalation Aero Soln Inhale 2 puffs into the lungs every 6 (six) hours as needed for Wheezing.      cyanocobalamin 1000 MCG/ML Injection Solution Inject 1 mL (1,000 mcg total) into the muscle every 30 (thirty) days. 3 mL 9    Naloxone HCl 4 MG/0.1ML Nasal Liquid 4 mg by Nasal route as needed. If patient remains unresponsive, repeat dose in other nostril 2-5 minutes after first dose. 1 kit 0    acetaminophen 500 MG Oral Tab Take 1 tablet (500 mg total) by mouth every 6 (six) hours as needed for Pain.      psyllium 28 % Oral Powd Pack Take 1 packet by mouth 2 (two) times daily. 15 packet 0    Melatonin 10 MG Oral Cap Take 6 mg by mouth nightly as needed. (Patient not taking: Reported on 1/6/2024)      Vitamin D3 (VITAMIN D3) 2000 UNITS Oral Cap Take 1 capsule (2,000 Units total) by mouth daily.       REVIEW OF SYSTEMS:  All other systems were reviewed and were negative except for those previously mentioned in the HPI    PHYSICAL EXAMINATION:  General: No acute distress.  Respiratory: Non-labored respirations bilaterally. No audible wheezing  Cardiovascular: Extremities warm and well-perfused.  Abdomen: Soft, nontender, nondistended.   Musculoskeletal: Moves all extremities well, symmetrically.  Extremities: No edema.    NEUROLOGIC EXAMINATION:  Mental status: Alert and oriented x 3  Speech: Clear, fluent  Cranial nerves: PERRLA, EOMI, face symmetric, with normal strength and sensation, tongue and palate midline, SCM 5/5 bilaterally  Motor:     RIGHT  Delt 5/5   Bic 5/5  Tri 5/5   HI 5/5    5/5  IP 5/5   Quad 5/5   Ham 5/5   AT 5/5   EHL 5/5 Elier 5/5     LEFT    Delt 5/5   Bic 5/5  Tri 5/5   HI 5/5    5/5  IP 5/5   Quad 5/5   Ham 5/5   AT 5/5   EHL 5/5 Elier 5/5   No pronator drift  Tone: Normal  Atrophy/Fasciculations: None  Sensation: Normal to light touch, symmetric, no neglect    IMAGING:  CT head:  No acute intracranial process. No new hemorrhage or hydrocephalus. No skull fractures.   CT c-spine: No acute fractures or subluxations.   CT a/p: Postsurgical changes related to L4-5 interbody and posterior lateral fusion. No evidence of hardware failure or pseudoarthrosis.  Haloing seen on sagittal views is artifactual and present on studies from the past.    ASSESSMENT:  Mr. Zuleta presents status post syncopal fall resulting in neck and back pain.  CT abdomen pelvis originally interpreted as concerning for hardware failure.  The haloing that is seen on the sagittal views has been present for multiple years dating back to prior comparison studies and is purely artifactual as the axial cuts demonstrate no such thing.  There is also complete interbody fusion. Prior to his fall he did not have any significant low back pain as would be expected with pseudoarthrosis. Therefore, his presenting neck and back pain are likely musculoskeletal.  I instructed the patient to monitor and to contact our office for follow-up if this pain over time.     Plan:  -No neurosurgical interventions warranted  -No neurosurgical follow-up necessary    Jacek Mancia MD  Neurological Surgery    [address] , [address] [address]  [phone]  Pager [phone]  1/7/2024 2:18 PM      This note was created using a voice-recognition transcribing system. Incorrect words or phrases may have been missed during proofreading. Please interpret accordingly.    Total Time    Consult total Time       60  minutes.      Activities       Preparing to see the patient (chart/tests/imaging review).       Obtaining and/or reviewing separately obtained history.       Performing a medically appropriate examination and/or evaluation.       Counseling and educating the patient/family/caregiver.       Ordering medications, tests, or procedures.       Referring and communicating with other health  care professionals (when not separately reported).       Documenting clincal information in the electronic or other health record.       Independently interpreting results (not separately reported).    Communicating results to the patient/family/caregiver.    Care coordination (not separately reported).    Electronically signed by Jacek Mancia MD on 2024  2:26 PM              Discharge Summary - D/C Summary      Discharge Summary signed by La Gonzales DO at 2024  2:57 PM  Version 1 of 1    Author: La Gonzales DO Service: — Author Type: Physician    Filed: 2024  2:57 PM Date of Service: 2024  8:25 AM Status: Signed    : La Gonzales DO (Physician)       [facility]     Ozzie Zuleta Patient Status:  Observation    3/8/1940 MRN [id number]   [address] Attending La Gonzales DO   Hosp Day # 0 PCP Ozzie Alfaro MD     Date of Admission: 2024  Date of Discharge:   24  Discharge Disposition: Home Health Care Services    Discharge Diagnosis:  # Recurrent fall/syncope  # Severe orthostatic hypotension  # Hx Esophageal stricture s/p balloon dilation   # CBD dilation  # lumbar fusion hardware   # Chronic pain  # Hyponatremia  # Hyperlipidemia   # GERD   # Hypothyroidism     History of Present Illness: (per Dr. Dunlap), Ozzie Zuleta is a 83 year old male with PMHx GERD, HTN, HLD, recurrent falls, chronic benzo use, hx esophageal stricture s/p balloon dilation, Hypothyroidism who presents for syncope. Patient with history of recurrent syncopal episodes, multiple admissions for same, with cardiac workup negative.  Notes that today he was at home, was walking with walker when he started feeling lightheaded and dizzy and passed out.  Hit his head on the linoleum floor, states that he regained consciousness in approximately 30 seconds to an minute per his wife.  Was back to baseline mentation after  waking.  Denies associated chest pain, pressure, abdominal pain, nausea, vomiting.  Does note that he has a history of orthostatic hypotension and gets lightheaded when standing.  Notes that uses a walker and attempt to alleviate this however the longer he stands the more lightheaded he gets and notes that if he was standing for more than 1015 minutes he is likely to pass out.  Has not tried any medications for orthostasis or compression stockings.       Brief Synopsis: admitted with recurrent syncope/orthostatic hypotension. Treated supportively with compression stockings, IVF, and fludrocortisone started. Concern for possible CBD dilation on imaging and he underwent MRCP which was unremarkable. He was evaluated by SLP and cleared to continue pureed diet. NS was consulted to evaluate lumbar fusion hardware after fall and they recommended no further w/u, intervention, or f/u. His clinical condition improved and he was discharged to home with recommendation to schedule f/u with GI to further address chronic esophageal strictures. He was advised to f/u with PCP closely in outpt setting.     Lace+ Score: 69  59-90 High Risk  29-58 Medium Risk  0-28   Low Risk  Patient was referred to the [facility].    TCM Follow-Up Recommendation:  LACE > 58: High Risk of readmission after discharge from the hospital.      Procedures during hospitalization:   none    Incidental or significant findings and recommendations (brief descriptions):  As above    Lab/Test results pending at Discharge:   none    Consultants:  NS    Discharge Medication List:     Discharge Medications        START taking these medications        Instructions Prescription details   fludrocortisone 0.1 MG Tabs  Commonly known as: Florinef      Take 1 tablet (0.1 mg total) by mouth daily.   Quantity: 30 tablet  Refills: 0     levothyroxine 88 MCG Tabs  Commonly known as: Synthroid      Take 1 tablet (88 mcg total) by mouth before breakfast.    Quantity: 90 tablet  Refills: 0            CONTINUE taking these medications        Instructions Prescription details   acetaminophen 500 MG Tabs  Commonly known as: Tylenol Extra Strength      Take 1 tablet (500 mg total) by mouth every 6 (six) hours as needed for Pain.   Refills: 0     albuterol 108 (90 Base) MCG/ACT Aers  Commonly known as: Ventolin HFA      Inhale 2 puffs into the lungs every 6 (six) hours as needed for Wheezing.   Refills: 0     artificial saliva substitute Soln      Take 10 mL by mouth in the morning, at noon, and at bedtime.   Refills: 0     atorvastatin 10 MG Tabs  Commonly known as: Lipitor      Take 1 tablet (10 mg total) by mouth nightly.   Quantity: 30 tablet  Refills: 0     cholecalciferol 50 MCG (2000 UT) Caps  Commonly known as: Vitamin D3      Take 1 capsule (2,000 Units total) by mouth daily.   Refills: 0     cyanocobalamin 1000 MCG/ML Soln  Commonly known as: Vitamin B12      Inject 1 mL (1,000 mcg total) into the muscle every 30 (thirty) days.   Quantity: 3 mL  Refills: 9     docusate sodium 100 MG Caps  Commonly known as: Colace      Take 1 capsule (100 mg total) by mouth 2 (two) times daily.   Refills: 0     fentaNYL 25 MCG/HR Pt72  Commonly known as: Duragesic  Notes to patient: Current patch is on right upper back      Place 1 patch onto the skin every 3 (three) days.   Refills: 0     Insulin Syringe-Needle U-100 31G X 5/16\" 1 ML Misc  Commonly known as: BD Insulin Syringe Ultrafine      Use for B12 injection   Quantity: 10 each  Refills: 2     mirtazapine 7.5 MG Tabs  Commonly known as: Remeron  Start taking on: January 12, 2024      Take 1 tablet (7.5 mg total) by mouth nightly.   Quantity: 30 tablet  Refills: 2     Naloxone HCl 4 MG/0.1ML Liqd      4 mg by Nasal route as needed. If patient remains unresponsive, repeat dose in other nostril 2-5 minutes after first dose.   Quantity: 1 kit  Refills: 0     ondansetron 4 mg tablet  Commonly known as: Zofran      Take 1 tablet (4  mg total) by mouth every 8 (eight) hours as needed for Nausea.   Refills: 0     pantoprazole 40 MG Tbec  Commonly known as: Protonix      Take 1 tablet (40 mg total) by mouth before breakfast.   Quantity: 90 tablet  Refills: 3     psyllium 28 % Pack  Commonly known as: Metamucil      Take 1 packet by mouth 2 (two) times daily.   Quantity: 15 packet  Refills: 0     sennosides 8.6 MG Tabs  Commonly known as: Senokot      Take 2 tablets (17.2 mg total) by mouth daily as needed (constipation).   Refills: 0               Where to Get Your Medications        These medications were sent to [facility]/pharmacy #[id number] - [facility] [address], [phone], [phone] [address], [facility] [address]      Phone: [phone]   fludrocortisone 0.1 MG Tabs         [place]    Follow-up appointment:   Ozzie Alfaro MD  [address] [place] [id number] [phone]    Schedule an appointment as soon as possible for a visit in 1 week(s)  Need to have 4 staples removed. Make follow up appointment.    Semaj Harley MD  [address] [place] [id number] [phone]    Schedule an appointment as soon as possible for a visit      Appointments for Next 30 Days 2024 - 2024      None            Vital signs:  Temp:  [97.4 °F (36.3 °C)-98.2 °F (36.8 °C)] 98.2 °F (36.8 °C)  Pulse:  [62-83] 81  Resp:  [16-18] 16  BP: (114-164)/(62-90) 114/82  SpO2:  [92 %-97 %] 94 %    Physical Exam:    General: No acute distress   Lungs: clear to auscultation  Cardiovascular: S1, S2  Abdomen: Soft      -----------------------------------------------------------------------------------------------  PATIENT DISCHARGE INSTRUCTIONS: See electronic chart    La Gonzales,     Total time spent on discharge plannin minutes     The  Cures Act makes medical notes like these available to patients in the interest of transparency. Please be advised this is a  medical document. Medical documents are intended to carry relevant information, facts as evident, and the clinical opinion of the practitioner. The medical note is intended as peer to peer communication and may appear blunt or direct. It is written in medical language and may contain abbreviations or verbiage that are unfamiliar.       Electronically signed by La Gonzales DO on 1/8/2024  2:57 PM              Physical Therapy Notes (last 72 hours)      Physical Therapy Note signed by Mary Dominique PT at 1/7/2024  3:54 PM  Version 1 of 1    Author: Mary Dominique PT Service: Rehab Author Type: Physical Therapist    Filed: 1/7/2024  3:54 PM Date of Service: 1/7/2024  2:10 PM Status: Signed    : Mary Dominique PT (Physical Therapist)             PHYSICAL THERAPY EVALUATION - INPATIENT     Room Number: 8626/8626-A  Evaluation Date: 1/7/2024  Type of Evaluation: Initial  Physician Order: PT Eval and Treat    Presenting Problem: Syncope/collapse-s/p fall c blunt head trauma  Co-Morbidities : GERD, hypothyroidism, HLD, recurrent falls, chronic benzo use, esophageal stricture, s/p balloon dilation  Reason for Therapy: Mobility Dysfunction and Discharge Planning    History related to current admission: Patient is a 83 year old male admitted on 1/6/2024 from home for s/p fall, syncope/collapse.  Pt diagnosed with Syncope/collapse-s/p fall c blunt head trauma.    CT abd/pelvis 1/6/24-CONCLUSION:    1. The common duct is dilated measuring up to 1.3 cm.  No definite obstructing lesions are identified on CT.  This may be further evaluated with ERCP if indicated.  2. Lumbar fusion with findings concerning for hardware failure around the left L5 pedicle screw.  3. Left lower lobe pulmonary nodule measuring 6 millimeters.  Dedicated nonemergent CT of the chest is recommended.    CT cx spine 1/6/24-CONCLUSION:    1. No acute abnormality in the cervical spine.    2. Degenerative changes in the cervical  spine as above.    CT brain 1/6/24-CONCLUSION:  No acute intracranial abnormality.  Mild left parietal scalp soft tissue swelling    Xray L elbow 1/6/24-  CONCLUSION:  No acute abnormality in the left elbow.    ASSESSMENT   In this PT evaluation, the patient presents with the following impairments posture, endurance, strength, gait and balance.  These impairments and comorbidities manifest themselves as functional limitations in independent bed mobility, transfers, and gait, along c stoop negotiation.  The patient is below baseline and would benefit from skilled inpatient PT to address the above deficits to assist patient in returning to prior to level of function.   Functional outcome measures completed include [facility].  The AM-PAC '6-Clicks' Inpatient Basic Mobility Short Form was completed and this patient is demonstrating a Approx Degree of Impairment: 35.83%  degree of impairment in mobility.   Rec DC to home c HHPT and cont'd use of RW when medically appropriate. Pt may also benefit from additional support at home as he was commenting that him and his wife are not able to shower or appropriately sponge bathe.   DISCHARGE RECOMMENDATIONS  PT Discharge Recommendations: Home with home health PT    PLAN  PT Treatment Plan: Bed mobility;Body mechanics;Endurance;Energy conservation;Patient education;Family education;Gait training;Strengthening;Transfer training;Balance training  Rehab Potential : Good  Frequency (Obs): 3x/week  Number of Visits to Meet Established Goals: 5      CURRENT GOALS    Goal #1 Patient is able to demonstrate supine - sit EOB @ level: independent     Goal #2 Patient is able to demonstrate transfers EOB to/from Chair/Wheelchair at assistance level: modified independent     Goal #3 Patient is able to ambulate 250 feet with assist device: walker - rolling at assistance level: modified independent     Goal #4 Pt will demo ability to negotiate 1-2 steps to enter/exit his home c supervision by DC    Goal #5    Goal #6    Goal Comments: Goals established on 1/7/2024    HOME SITUATION  Type of Home: House   Home Layout: One level  Stairs to Enter : 2  Railing: Yes          Lives With: Spouse  Drives: No  Patient Owned Equipment: Rolling walker;Other (Comment) (grab bars)  Patient Regularly Uses: Reading glasses    Prior Level of [place]: Pt reporting that he is indep c dressing, however struggles c bathing-reporting that he and his wife have not been able to safely shower (sitting or standing) or sponge bathe for 'quite some time'. Pt amb using RW, however reports frequent falling d/t these random syncopal events; family or neighbors assist c grocery shopping and driving them to appt's    SUBJECTIVE  \"I haven't had anything to eat so I am weak!\"      OBJECTIVE  Precautions: Bed/chair alarm;Other (Comment) (monitor orthostatics)  Fall Risk: High fall risk    WEIGHT BEARING RESTRICTION  Weight Bearing Restriction: None                PAIN ASSESSMENT  Rating: Unable to rate  Location: head  Management Techniques: Activity promotion;Body mechanics;Repositioning    COGNITION  Overall Cognitive Status:  WFL - within functional limits  Arousal/Alertness:  appropriate responses to stimuli  Orientation Level:  oriented x4  Following Commands:  follows all commands and directions without difficulty  Safety Judgement:  good awareness of safety precautions    RANGE OF MOTION AND STRENGTH ASSESSMENT  Upper extremity ROM and strength are within functional limits     Lower extremity ROM is within functional limits     Lower extremity strength is -BLE hip flex 4/5, quad 4+/5, ankle 4+/5      BALANCE  Static Sitting: Fair +  Dynamic Sitting: Fair +  Static Standing: Poor +  Dynamic Standing: Poor +    ADDITIONAL TESTS                                    ACTIVITY TOLERANCE                         O2 WALK       NEUROLOGICAL FINDINGS  Neurological Findings: Sensation           Sensation: BLE symmetrical/intact to light touch          AM-PAC '6-Clicks' INPATIENT SHORT FORM - BASIC MOBILITY  How much difficulty does the patient currently have...  Patient Difficulty: Turning over in bed (including adjusting bedclothes, sheets and blankets)?: None   Patient Difficulty: Sitting down on and standing up from a chair with arms (e.g., wheelchair, bedside commode, etc.): A Little   Patient Difficulty: Moving from lying on back to sitting on the side of the bed?: None   How much help from another person does the patient currently need...   Help from Another: Moving to and from a bed to a chair (including a wheelchair)?: A Little   Help from Another: Need to walk in hospital room?: A Little   Help from Another: Climbing 3-5 steps with a railing?: A Little       AM-PAC Score:  Raw Score: 20   Approx Degree of Impairment: 35.83%   Standardized Score (AM-PAC Scale): 47.67   CMS Modifier (G-Code): CJ    FUNCTIONAL ABILITY STATUS  Gait Assessment   Functional Mobility/Gait Assessment  Gait Assistance: Supervision  Distance (ft): 75  Assistive Device: Rolling walker  Pattern: Shuffle;L Foot flat;R Foot flat;Comment (NBOS)    Skilled Therapy Provided     Bed Mobility:  Rolling: NT  Supine to sit: mod indep   Sit to supine: mod indep     Transfer Mobility:  Sit to stand: supervision   Stand to sit: supervision  Gait = supervision x75' c RW    Therapist's Comments: Pt presents to PT lying supine in bed. Pt is eager to participate, however reporting he has been very irritated that his syncopal events have occurred without warning. Pt t/f supine/sit c mod indep to the EOB, scoot and reposition safely. Denies any LH, SBP was 171. Pt provided c RW and able to sit/stand c supervision and RW. When in standing, he denies LH, however SBP was 118 (+orthostatic). As pt was asymptomatic, he was agreeable to attempt amb. Pt able to amb 75' c supervision and RW, however c above gait deviations. Following this, pt returned to his room and sat back on the EOB and supine c  supervision. Pt was then brought down to MRI via transport and RN aware of session.     Exercise/Education Provided:  Bed mobility  Body mechanics  Functional activity tolerated  Gait training  Posture  Transfer training    Patient End of Session: In bed;Needs met;Call light within reach;RN aware of session/findings;All patient questions and concerns addressed;Alarm set      Patient Evaluation Complexity Level:  History Moderate - 1 or 2 personal factors and/or co-morbidities   Examination of body systems Low - addressing 1-2 elements   Clinical Presentation Low - Stable   Clinical Decision Making Low - Stable       PT Session Time: 30 minutes  Gait Trainin minutes  Therapeutic Activity: 5 minutes                          Occupational Therapy Notes (last 72 hours)      Occupational Therapy Note signed by Chucho Sellers OT at 2024  1:41 PM  Version 1 of 1    Author: Chucho Sellers OT Service: Rehab Author Type: Occupational Therapist    Filed: 2024  1:41 PM Date of Service: 2024  1:34 PM Status: Signed    : Chucho Sellers OT (Occupational Therapist)         OCCUPATIONAL THERAPY EVALUATION - INPATIENT    Room Number: 8626/8626-A  Evaluation Date: 2024     Type of Evaluation: Initial  Presenting Problem: syncope and collapse    Physician Order: IP Consult to Occupational Therapy  Reason for Therapy:  ADL/IADL Dysfunction and Discharge Planning    History: Patient is a 83 year old male admitted on 2024 with Presenting Problem: syncope and collapse.  Co-Morbidities : GERD, hypothyroidism, HLD, recurrent falls, chronic benzo use, esophageal stricture, s/p balloon dilation    ASSESSMENT   Patient met all OT goals at or close to baseline level. Pt presents with some decreased in BP with position changes from supine to sit to stand. Pt reported some mild dizziness with sitting and standing with BP stabilizing around 126/78 with increased time standing. Pt aware of compensatory  strategies such as resting for periods of time between position changes to give body time to adjust. Patient reports no further questions/concerns at this time.     The AM-PAC ' '6-Clicks' Inpatient Daily Activity Short Form was completed and this patient is demonstrating an Approx Degree of Impairment: 0% in activities of daily living. Research supports that patients with this level of impairment often benefit from discharge home with home health for follow-up on reported difficulty bathing at home and general safety within specific home environment.      OT Discharge Recommendations: Intermittent Supervision;Home with home health PT/OT  OT Device Recommendations: None    WEIGHT BEARING RESTRICTION  Weight Bearing Restriction: None                Recommendations for nursing staff:   Transfers: one person with RW  Toileting location: Toilet    EVALUATION SESSION:  Patient at start of session: semi-supine in bed  FUNCTIONAL TRANSFER ASSESSMENT  Sit to Stand: Edge of Bed  Edge of Bed: Supervision    BED MOBILITY  Supine to Sit : Independent  Sit to Supine (OT): Independent  Scooting: supervision    BALANCE ASSESSMENT  Static Sitting: Independent  Sitting Bilateral: Independent  Static Standing: Supervision  Standing Bilateral: Supervision    FUNCTIONAL ADL ASSESSMENT  UB Dressing Seated: Supervision  LB Dressing Seated: Supervision      ACTIVITY TOLERANCE: WFL           BP: 114/82  BP Location: Left arm  BP Method: Automatic  Patient Position: Standing    O2 SATURATIONS  Oxygen Therapy  SPO2% on Room Air at Rest: 94    COGNITION  Overall Cognitive Status:  WFL - within functional limits  COGNITION ASSESSMENTS       Upper Extremity:   ROM: within functional limits   Strength: is within functional limits   Coordination:  Gross motor: WFL  Fine motor: WFL  Sensation: Light touch:  intact    EDUCATION PROVIDED  Patient : Role of Occupational Therapy; Plan of Care; Discharge Recommendations; Functional Transfer Techniques;  Fall Prevention; Posture/Positioning  Patient's Response to Education: Verbalized Understanding; Returned Demonstration    Equipment used: RW  Demonstrates functional use    Therapist comments: Pt is pleasant and cooperative throughout session.     Patient End of Session: In bed;Needs met;Call light within reach;RN aware of session/findings;All patient questions and concerns addressed;Alarm set    OCCUPATIONAL PROFILE    HOME SITUATION  Type of Home: House  Home Layout: One level  Lives With: Spouse       Shower/Tub and Equipment: Walk-in shower;Tub-shower combo;Grab bar;Shower chair       Occupation/Status: watches TV     Drives: No  Patient Regularly Uses: Reading glasses    Prior Level of Function: Pt is MOD I with BADLs, IADLs, however reported some difficulty with bathing at home.     SUBJECTIVE  \"I'm going home today.\"    PAIN ASSESSMENT  Rating: Unable to rate  Location: back pain when seated EOB       OBJECTIVE  Precautions: Bed/chair alarm;Other (Comment) (monitor orthostatics)  Fall Risk: High fall risk    WEIGHT BEARING RESTRICTION  Weight Bearing Restriction: None                AM-PAC ‘6-Clicks’ Inpatient Daily Activity Short Form  -   Putting on and taking off regular lower body clothing?: None  -   Bathing (including washing, rinsing, drying)?: None  -   Toileting, which includes using toilet, bedpan or urinal? : None  -   Putting on and taking off regular upper body clothing?: None  -   Taking care of personal grooming such as brushing teeth?: None  -   Eating meals?: None    AM-PAC Score:  Score: 24  Approx Degree of Impairment: 0%  Standardized Score (AM-PAC Scale): 57.54      ADDITIONAL TESTS     NEUROLOGICAL FINDINGS        PLAN   Patient has been evaluated and presents with no skilled Occupational Therapy needs at this time.  Patient discharged from Occupational Therapy services.  Please re-order if a new functional limitation presents during this admission.      Patient Evaluation Complexity  Level:   Occupational Profile/Medical History LOW - Brief history including review of medical or therapy records    Specific performance deficits impacting engagement in ADL/IADL LOW  1 - 3 performance deficits    Client Assessment/Performance Deficits MODERATE - Comorbidities and min to mod modifications of tasks    Clinical Decision Making LOW - Analysis of occupational profile, problem-focused assessments, limited treatment options    Overall Complexity LOW     OT Session Time: 15 minutes  Self-Care Home Management: 4 minutes  Therapeutic Activity: 7 minutes               Video Swallow Study Notes    No notes of this type exist for this encounter.        SLP Note - SLP Notes      SLP Note signed by Carmen Meyer SLP at 1/8/2024  9:34 AM  Version 1 of 1    Author: Carmen Meyer SLP Service: Rehab Author Type: SPEECH-LANGUAGE PATHOLOGIST    Filed: 1/8/2024  9:34 AM Date of Service: 1/8/2024  9:18 AM Status: Signed    : Carmen Meyer SLP (SPEECH-LANGUAGE PATHOLOGIST)       ADULT SWALLOWING EVALUATION    ASSESSMENT    ASSESSMENT/OVERALL IMPRESSION:  Patient with history of recurrent syncopal episodes, multiple admissions for same, with cardiac workup negative.  Notes that today he was at home, was walking with walker when he started feeling lightheaded and dizzy and passed out.  Hit his head on the linoleum floor, states that he regained consciousness in approximately 30 seconds to a minute.  Was back to baseline mentation after waking. Patient known to this service from previous swallow eval last time in 2021 with recommendation for puree diet, thin liquids at that time. Patient with PMHx including ACDF 2014, broken jaw s/p fall in 2015, and \"narrow esophagus\" per patient report. Patient reports consuming a primarily puree diet with thin liquids at home and on occasion a cookie.Seen by our department on 7/27/21 and 12/12/23 for history of modified diet with recommendation of puree diet and thin  liquids.     Orders were received for a bedside swallowing evaluation as he was admitted on modified diet of puree diet and thin liquids(patient preference).   Oral motor mechanism exam revealed functional oral range, rate, and strength of oral musculature, missing several teeth in posterior oral cavity, and  clear vocal quality. Strong cough on command.    Assessed patient with thin liquids via spoon, cup, and straw, puree, and solids (cookie).    Oral phase revealed functional oral acceptance, retrieval and mastication of solids with timely and complete clearing of the oral cavity.   Pharyngeal phase revealed an apparent timely initiation of the pharyngeal phase with functional hyolaryngeal elevation on palpation. No coughing or throat clearing. Patient presents with functional oral phase and apparent functional pharyngeal phase. Patient demonstrates safe po intake of solids.   Recommend patient preferred diet of puree solids, safe of solids, and thin liquids. Recommend dietary consult for nutritional needs.   No further skilled dysphagia therapy is warranted.          RECOMMENDATIONS   Diet Recommendations - Solids: Puree (Pateint prefers puree. He is safe for solids of cookies/pastries.)  Diet Recommendations - Liquids: Thin Liquids    Aspiration Precautions: Upright position  Medication Administration Recommendations: No restrictions  Treatment Plan/Recommendations: No further inpatient SLP service warranted (dietary consult)  Discharge Recommendations/Plan: Undetermined    HISTORY   MEDICAL HISTORY  Reason for Referral: R/O aspiration    Problem List  Principal Problem:    Syncope and collapse  Active Problems:    Dilation of biliary tract    Blunt head trauma, initial encounter    Abdominal pain, acute    Strain of neck muscle, initial encounter    Esophageal stricture    Orthostatic hypotension    Neck pain    Acute midline low back pain without sciatica      Past Medical History  Past Medical History:    Diagnosis Date    Abdominal distention     Abdominal pain     Acute encephalopathy     Anemia     Back pain     Back problem     back and neck pain    Black stools     Bladder incontinence     Bloating     Community acquired pneumonia of right middle lobe of lung     Constipation     Diarrhea, unspecified     Disorder of prostate     Dizziness     Esophageal reflux     Exposure to TB     Fatigue     Flatulence/gas pain/belching     Frequent use of laxatives     Hearing loss     Heart palpitations     Hemorrhoids     High blood pressure     High cholesterol     Hoarseness, chronic     Incontinence     Indigestion 03/01/2018    Irregular bowel habits     Leaking of urine     Loss of appetite     Other and unspecified hyperlipidemia     PAC (premature atrial contraction) 01/22/2015    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    Problems with swallowing     Sleep disturbance     Stool incontinence     Unspecified disorder of thyroid     Unspecified essential hypertension     Unspecified gastritis and gastroduodenitis without mention of hemorrhage     Unspecified hypothyroidism     Weight loss        Prior Living Situation: Home alone  Diet Prior to Admission: Puree;Thin liquids (eats cookies and pastries)       Patient/Family Goals: to eat and drink    SWALLOWING HISTORY  Current Diet Consistency: Puree;Thin liquids  Dysphagia History: Seen 12/12/23 and 7/ 27/21 with recommendation of patient's preferred diet of  puree solids and thin liquids.   Imaging Results:     SUBJECTIVE       OBJECTIVE   ORAL MOTOR EXAMINATION  Dentition: Natural (missing numerous back teeth)  Symmetry: Within Functional Limits  Strength: Within Functional Limits  Tone: Within Functional Limits  Range of Motion: Within Functional Limits  Rate of Motion: Within Functional Limits    Voice Quality: Clear  Respiratory Status: Unlabored  Consistencies Trialed: Thin liquids;Puree;Hard solid  Method of Presentation: Self  presentation;Spoon;Cup;Straw;Consecutive swallows  Patient Positioning: Upright;Midline (in bed)    Oral Phase of Swallow: Within Functional Limits                      Pharyngeal Phase of Swallow: Within Functional Limits           (Please note: Silent aspiration cannot be evaluated clinically. Videofluoroscopic Swallow Study is required to rule-out silent aspiration.)    Esophageal Phase of Swallow: No complaints consistent with possible esophageal involvement  Comments:         FOLLOW UP  Treatment Plan/Recommendations: No further inpatient SLP service warranted (dietray consult)     Follow Up Needed (Documentation Required): No  SLP Follow-up Date: 01/08/24    Thank you for your referral.   If you have any questions, please contact [facility]    Electronically signed by Carmen Meyer SLP on 1/8/2024  9:34 AM           Immunizations     Name Date      Covid-19 Moderna 03/15/21     Covid-19 Moderna 02/15/21     INFLUENZA 12/20/12     INFLUENZA 01/01/11     Ketorolac Tromethamine  60 mg-2ml 08/16/12     Pneumovax 23 01/01/11     Solu Medrol 40 mg 08/16/12     TD 12/08/14       Multidisciplinary Problems     Active Goals        Problem: Patient/Family Goals    Goal Priority Disciplines Outcome Interventions   Patient/Family Long Term Goal     Interdisciplinary Progressing    Description: Patient's Long Term Goal: discharge     Interventions:  - appropriate consults, taking prescribed meds  - See additional Care Plan goals for specific interventions   Patient/Family Short Term Goal     Interdisciplinary Progressing    Description: Patient's Short Term Goal: feel better    Interventions:   - speech consult, PT/OT, MRCP   - See additional Care Plan goals for specific interventions

## (undated) NOTE — MR AVS SNAPSHOT
[address]  [address] [place] [id number] [phone]               Thank you for choosing us for your health care visit with Sonja Javed MD.  We are glad to serve you and happy to provide you with this This list is accurate as of: 3/27/17  1:12 PM.  Always use your most recent med list.                aspirin 81 MG Tbec   Take 81 mg by mouth daily. Atorvastatin Calcium 10 MG Tabs   TAKE 1 TABLET (10 MG TOTAL) BY MOUTH NIGHTLY.    Commonly known medications prescribed for you. Read the directions carefully, and ask your doctor or other care provider to review them with you. Where to Get Your Medications      These medications were sent to [facility]/PHARMACY #[id number]- [address]  STAT

## (undated) NOTE — IP AVS SNAPSHOT
Patient Demographics     Address  [address] [place] [address]  [place] [address] [id number] Phone  [phone] [facility])  [phone] (Mobile) *Preferred* E-mail Address  [email or web address]      Emergency Contact(s)     Name Relation Home Work Mobile    Dannie Zuleta CEFTIN      Take 1 tablet (500 mg total) by mouth 2 (two) times daily for 7 days. Stop taking on: August 4, 2021   Dave Phillips MD         clonazePAM 2 MG Tabs  Commonly known as: KLONOPIN      Take 1 tablet (2 mg total) by mouth nightly.    Prema Taveras packet by mouth 2 (two) times daily. Angela Strong, DO         Vitamin D3 (Cholecalciferol) 50 MCG (2000 UT) Caps      Take 1 capsule by mouth daily.                 Where to Get Your Medications      These medications were sent to [facility]/pharmacy #[id number]- Signs       Most Recent Value   Vitals  133/67 Filed at 07/29/2021 1138   Pulse  74 Filed at 07/29/2021 1300   Resp  18 Filed at 07/29/2021 1138   Temp  97.6 °F (36.4 °C) Filed at 07/29/2021 1138   SpO2  93 % Filed at 07/29/2021 1300      Patient's Most Re wife today who had left him for ~ 1.5 hours. Pt had been feeling weak, no fever, admits to some chills. Has chronic cough. No diarrhea. Reporting headache, neck pain after fall.      Past Medical History:  Past Medical History:   Diagnosis Date   • Abdomina PAIN MANAGEMENT   • HERNIA SURGERY  2014   • INJECTION, ANESTHETIC/STEROID, TRANSFORAMINAL EPIDURAL; LUMBAR/SACRAL, SINGLE LEVEL  9/7/2012    Procedure: TRANSFORAMINAL EPIDURAL - LUMBAR;  Surgeon: Eliz Doran MD;  Location: [address] [place] [address] 5    • OTHER  08    esophagogastroduodenoscopy   • OTHER      jaw fracture/repair, hardware remains   • PATIENT DOCUMENTED NOT TO HAVE EXPERIENCED ANY OF THE FOLLOWING EVENTS  4/2/2013    Procedure: TRANSFORAMINAL EPIDURAL - LUMBAR;  Surgeon: Madi Rodriguez [place] FOR PAIN MANAGEMENT   • PATIENT [place] PREOPERATIVE ORDER FOR IV ANTIBIOTIC SURGICAL SITE INFECTION PROPHYLAXIS.  N/A 1/15/2015    Procedure: INTRATHECAL PUMP TRIAL;  Surgeon: Garry Felix MD;  Location: [address] [place] [address] Sulfa Antibiotics         Versed                      Comment:Dizzy  Flonase [Fluticason*    FATIGUE    Comment:SUSP    Medications:  No current facility-administered medications on file prior to encounter.   clonazePAM 2 MG Oral Tab, Take 1 tablet (2 mg to LEVOTHYROXINE SODIUM 88 MCG Oral Tab, TAKE 1 TABLET (88 MCG TOTAL) BY MOUTH BEFORE BREAKFAST, Disp: 90 tablet, Rfl: 1  cyanocobalamin 1000 MCG/ML Injection Solution, Inject 1 mL (1,000 mcg total) into the muscle every 30 (thirty) days. , Disp: 3 mL, Rfl: 9 CREATSERUM 0.98   GFRAA 82   GFRNAA 71   CA 8.2*   ALB 2.7*   *   K 4.6   CL 96*   CO2 29.0   ALKPHO 103   AST 28   ALT 21   BILT 1.4   TP 6.8       CrCl cannot be calculated (Unknown ideal weight.).     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   • Problems with swallowing    • Sleep disturbance    • EPIDURAL/SUBARACHNOID; LUMBAR/SACRAL  4/28/2014    Procedure: INTRATHECAL PUMP TRIAL;  Surgeon: Vane Argueta MD;  Location: [facility] FOR PAIN MANAGEMENT   • INJECTION, W/WO CONTRAST, DX/THERAPEUTIC SUBSTANCE, EPIDURAL/SUBARACHNOID; LUMBAR/SACRAL N/A 1 ANY OF THE FOLLOWING EVENTS  4/28/2014    Procedure: INTRATHECAL PUMP TRIAL;  Surgeon: Donna Palma MD;  Location: [address] [place] [address]   • PATIENT DOCUMENTED NOT TO HAVE EXPERIENCED ANY OF THE FOLLOWING EVENTS N/A 1/15/2015    Procedure: Eric Jordan 2/3/2014    Procedure: STIMULATOR TRIAL PERIPHERAL;  Surgeon: Humberto Wilks MD;  Location: [address] [facility] [address] PAIN MANAGEMENT   • PERCUT IMPLNT [address]  12/18/2013    Procedure: STIMULATOR TRIAL EPIDURAL LEAD;  Surgeon: Hubmerto Wilks MD;  [facility]   -   Need to walk in hospital room?: A Little   -   Climbing 3-5 steps with a railing?: A Little[DK. 2]       AM-PAC Score:[DK.1]  Raw Score: 20   Approx Degree of Impairment: 35.83%   Standardized Score (AM-PAC Scale): 47.67   CMS Modifier (G-Code) evident with turns as pt becomes more unsteady requiring increase guarding.   The patient is below baseline and would benefit from skilled inpatient PT to address the above deficits to assist patient in returning to prior to level of function    DISCHARGE R c/o fall, pt was found on floor. Pt diagnosed with PNA, syncope.      Therapy significant imaging (date, test, result):  CT BRAIN 7/26/21: negative for acute event per imaging report  CT SPINE CERVICAL 7/26/21: negative for acute findings per imaging repor SURGERY  5/22/14    C4-C7 ACDF    • CATARACT     • COLONOSCOPY  7/5/11   • COLONOSCOPY     • FLUOR GID & Shauna Machucat NDL/CATH SPI DX/THER NJX  9/28/2012    Procedure: TRANSFORAMINAL EPIDURAL - LUMBAR;  Surgeon: Jaspal Medeiros MD;  Location: [address] [place] [address] PAIN MANAGEMENT   • M-SEDAJ BY  PHYS [id number] [facility] [address]  N SVC 5+ YR  12/18/2013    Procedure: STIMULATOR TRIAL EPIDURAL LEAD;  Surgeon: Michaela Kerr MD;  Location: [facility] FOR PAIN MANAGEMENT   • M-SEDAJ BY  PHYS [id number] [facility] 59  N SV 5+ YR  2/3/2014    Procedure: STIM ANTIBIOTIC SURGICAL SITE INFECTION PROPHYLAXIS.  4/2/2013    Procedure: TRANSFORAMINAL EPIDURAL - LUMBAR;  Surgeon: Kirill Juarez MD;  Location: [address] [place] [address]   • PATIENT [address] FOR IV ANTIBIOTIC SURGICAL SITE INFECT TONSILLECTOMY     • UPPER GI ENDOSCOPY,EXAM         HOME SITUATION  Type of Home: House   Home Layout: One level  Stairs to Enter : 2  Railing: No          Lives With: Spouse  Drives: No  Patient Owned Equipment: Rolling walker  Patient Regularly Uses: Non back to sitting on the side of the bed?: A Little   How much help from another person does the patient currently need. ..   -   Moving to and from a bed to a chair (including a wheelchair)?: A Little   -   Need to walk in hospital room?: A [address] 78year old male admitted on 7/26/2021 for[MD.1] PNA, s/p fall[MD.2]. Pertinent comorbidities and personal factors impacting therapy include[MD.1] anemia, htn[MD.2].   In this PT evaluation, the patient presents with the following impairments[MD.1] dec act level:[MD.1] modified independent[MD.2]     Goal #2 Patient is able to demonstrate transfers[MD.1] EOB to/from Chair/Wheelchair[MD.2] at assistance level:[MD.1] modified independent[MD.2]     Goal #3 Patient is able to ambulate[MD.1] 300[MD.2] feet with as activities of daily living, rest and sleep, leisure and social participation.      The patient is functioning below his previous functional level and would benefit from skilled inpatient OT to address the above deficits, maximizing patient’s ability to retu due to fall in the shower. Pt states at home he often falls and states \"I just pass out. \" Pt ambulates with RW at all times. Typically able to dress and use toilet without assist although reports fall washing hands at sink, hitting chin on counter.      Dylan Barnes provided: Role of OT, Safety with ADL and transfers, Precautions, safety recommendations, Discharge recommendations and rationale   Verbalizes not understanding, Needs reinforcement    Equipment used: RW  Demonstrates functional use    Therapist comments: dysphagia tx to assess tolerance with recommended diet, ensure proper utilization of aspiration precautions and provide pt/family education. Pt found sitting up in bed alert and orientated.   Pt reports that he continues to experience globus sensation when by CAMMY Leonard on 7/28/2021  2:32 PM   Attribution Key    MF.1 - CAMMY Leonard on 7/28/2021  2:28 PM                     Immunizations     Name Date      Covid-19 Moderna 03/15/21     Covid-19 Moderna 02/15/21     Ketorolac Tromethamine

## (undated) NOTE — LETTER
08/13/20        Yarelis Zuleta  [address] [place] [phone]      Dear Bette Quintero,    [facility] records indicate that you have outstanding lab work and or testing that was ordered for you and has not yet been completed:  Orders Placed This Enc

## (undated) NOTE — IP AVS SNAPSHOT
[address]            (For Outpatient Use Only) Initial Admit Date: 7/26/2021   Inpt/Obs Admit Date: Inpt: 7/26/21 / Obs: N/A   Discharge Date:    Nelda Norman:  [de-identified]   MRN: [de-identified]   CSN: [id number]   CEID: [id number] Subscriber: SELF   TERTIARY INSURANCE   Payor:  Plan:    Group Number:  Insurance Type:    Subscriber Name:  Subscriber :    Subscriber ID:  Pt Rel to Subscriber:    Hospital Account Financial Class: Medicare    2021

## (undated) NOTE — ED AVS SNAPSHOT
Verenice John   MRN: [id number]    Department:  [facility] Emergency Department   Date of Visit:  4/4/2018           Disclosure     Insurance plans vary and the physician(s) referred by the ER may not be covered by your plan.  Please contact y tell this physician (or your personal doctor if your instructions are to return to your personal doctor) about any new or lasting problems. The primary care or specialist physician will see patients referred from the [facility] Emergency Department.  Severo Pastures